# Patient Record
Sex: FEMALE | Race: WHITE | Employment: OTHER | URBAN - METROPOLITAN AREA
[De-identification: names, ages, dates, MRNs, and addresses within clinical notes are randomized per-mention and may not be internally consistent; named-entity substitution may affect disease eponyms.]

---

## 2018-02-27 ENCOUNTER — HOSPITAL ENCOUNTER (EMERGENCY)
Age: 83
Discharge: HOME | End: 2018-02-27
Attending: EMERGENCY MEDICINE | Admitting: EMERGENCY MEDICINE
Payer: MEDICARE

## 2018-02-27 ENCOUNTER — APPOINTMENT (OUTPATIENT)
Dept: CT IMAGING | Age: 83
End: 2018-02-27
Attending: EMERGENCY MEDICINE
Payer: MEDICARE

## 2018-02-27 VITALS
RESPIRATION RATE: 18 BRPM | SYSTOLIC BLOOD PRESSURE: 157 MMHG | DIASTOLIC BLOOD PRESSURE: 50 MMHG | OXYGEN SATURATION: 99 % | HEART RATE: 90 BPM | TEMPERATURE: 98.6 F | WEIGHT: 150 LBS | HEIGHT: 58 IN | BODY MASS INDEX: 31.49 KG/M2

## 2018-02-27 DIAGNOSIS — S00.83XA CONTUSION OF FACE, INITIAL ENCOUNTER: ICD-10-CM

## 2018-02-27 DIAGNOSIS — S09.90XA CLOSED HEAD INJURY, INITIAL ENCOUNTER: Primary | ICD-10-CM

## 2018-02-27 LAB
ALBUMIN SERPL-MCNC: 3.7 G/DL (ref 3.4–5)
ALBUMIN/GLOB SERPL: 1.2 {RATIO} (ref 0.8–1.7)
ALP SERPL-CCNC: 123 U/L (ref 45–117)
ALT SERPL-CCNC: 23 U/L (ref 13–56)
ANION GAP SERPL CALC-SCNC: 8 MMOL/L (ref 3–18)
AST SERPL-CCNC: 23 U/L (ref 15–37)
BASOPHILS # BLD: 0 K/UL (ref 0–0.06)
BASOPHILS NFR BLD: 0 % (ref 0–2)
BILIRUB SERPL-MCNC: 0.2 MG/DL (ref 0.2–1)
BUN SERPL-MCNC: 33 MG/DL (ref 7–18)
BUN/CREAT SERPL: 36 (ref 12–20)
CALCIUM SERPL-MCNC: 9.3 MG/DL (ref 8.5–10.1)
CHLORIDE SERPL-SCNC: 104 MMOL/L (ref 100–108)
CO2 SERPL-SCNC: 27 MMOL/L (ref 21–32)
CREAT SERPL-MCNC: 0.92 MG/DL (ref 0.6–1.3)
DIFFERENTIAL METHOD BLD: ABNORMAL
EOSINOPHIL # BLD: 0.3 K/UL (ref 0–0.4)
EOSINOPHIL NFR BLD: 3 % (ref 0–5)
ERYTHROCYTE [DISTWIDTH] IN BLOOD BY AUTOMATED COUNT: 11.9 % (ref 11.6–14.5)
GLOBULIN SER CALC-MCNC: 3 G/DL (ref 2–4)
GLUCOSE SERPL-MCNC: 106 MG/DL (ref 74–99)
HCT VFR BLD AUTO: 39.6 % (ref 35–45)
HGB BLD-MCNC: 13.3 G/DL (ref 12–16)
INR PPP: 1 (ref 0.8–1.2)
LYMPHOCYTES # BLD: 1.4 K/UL (ref 0.9–3.6)
LYMPHOCYTES NFR BLD: 15 % (ref 21–52)
MCH RBC QN AUTO: 32.2 PG (ref 24–34)
MCHC RBC AUTO-ENTMCNC: 33.6 G/DL (ref 31–37)
MCV RBC AUTO: 95.9 FL (ref 74–97)
MONOCYTES # BLD: 1.1 K/UL (ref 0.05–1.2)
MONOCYTES NFR BLD: 12 % (ref 3–10)
NEUTS SEG # BLD: 6.8 K/UL (ref 1.8–8)
NEUTS SEG NFR BLD: 70 % (ref 40–73)
PLATELET # BLD AUTO: 260 K/UL (ref 135–420)
PMV BLD AUTO: 8.8 FL (ref 9.2–11.8)
POTASSIUM SERPL-SCNC: 4.2 MMOL/L (ref 3.5–5.5)
PROT SERPL-MCNC: 6.7 G/DL (ref 6.4–8.2)
PROTHROMBIN TIME: 12.4 SEC (ref 11.5–15.2)
RBC # BLD AUTO: 4.13 M/UL (ref 4.2–5.3)
SODIUM SERPL-SCNC: 139 MMOL/L (ref 136–145)
WBC # BLD AUTO: 9.7 K/UL (ref 4.6–13.2)

## 2018-02-27 PROCEDURE — 80053 COMPREHEN METABOLIC PANEL: CPT

## 2018-02-27 PROCEDURE — 85610 PROTHROMBIN TIME: CPT

## 2018-02-27 PROCEDURE — 99283 EMERGENCY DEPT VISIT LOW MDM: CPT

## 2018-02-27 PROCEDURE — 72125 CT NECK SPINE W/O DYE: CPT

## 2018-02-27 PROCEDURE — 74011250637 HC RX REV CODE- 250/637: Performed by: EMERGENCY MEDICINE

## 2018-02-27 PROCEDURE — 70486 CT MAXILLOFACIAL W/O DYE: CPT

## 2018-02-27 PROCEDURE — 85025 COMPLETE CBC W/AUTO DIFF WBC: CPT

## 2018-02-27 PROCEDURE — 70450 CT HEAD/BRAIN W/O DYE: CPT

## 2018-02-27 RX ORDER — ACETAMINOPHEN 325 MG/1
975 TABLET ORAL
Status: COMPLETED | OUTPATIENT
Start: 2018-02-27 | End: 2018-02-27

## 2018-02-27 RX ORDER — ACEBUTOLOL HYDROCHLORIDE 200 MG/1
CAPSULE ORAL 2 TIMES DAILY
COMMUNITY
End: 2018-07-18

## 2018-02-27 RX ORDER — LOSARTAN POTASSIUM 100 MG/1
100 TABLET ORAL DAILY
COMMUNITY

## 2018-02-27 RX ORDER — ATORVASTATIN CALCIUM 20 MG/1
10 TABLET, FILM COATED ORAL DAILY
COMMUNITY
End: 2020-01-01

## 2018-02-27 RX ADMIN — ACETAMINOPHEN 975 MG: 325 TABLET ORAL at 21:24

## 2018-02-27 NOTE — ED TRIAGE NOTES
Tripped and fell on sidewalk, hit forehead to asphalt. Hematoma to left eyebrow, abrasion to left hand, abrasion to left knee.  Denies LOC

## 2018-02-27 NOTE — ED NOTES
Patient states trip and fall incident that resulted in striking left orbit/head onto concrete. She denies LOC. States taking 8 - 10 Aspirin per day. She states abrasions to left knee and left hand. No appreciable deformity noted to hand or knee. Bruising noted to left patella. She denies neck pain, paresthesias or other complaints at present.

## 2018-02-28 NOTE — DISCHARGE INSTRUCTIONS
Head Injury: Care Instructions  Your Care Instructions    Most injuries to the head are minor. Bumps, cuts, and scrapes on the head and face usually heal well and can be treated the same as injuries to other parts of the body. Although it's rare, once in a while a more serious problem shows up after you are home. So it's good to be on the lookout for symptoms for a day or two. Follow-up care is a key part of your treatment and safety. Be sure to make and go to all appointments, and call your doctor if you are having problems. It's also a good idea to know your test results and keep a list of the medicines you take. How can you care for yourself at home? · Follow your doctor's instructions. He or she will tell you if you need someone to watch you closely for the next 24 hours or longer. · Take it easy for the next few days or more if you are not feeling well. · Ask your doctor when it's okay for you to go back to activities like driving a car, riding a bike, or operating machinery. When should you call for help? Call 911 anytime you think you may need emergency care. For example, call if:  ? · You have a seizure. ? · You passed out (lost consciousness). ? · You are confused or can't stay awake. ?Call your doctor now or seek immediate medical care if:  ? · You have new or worse vomiting. ? · You feel less alert. ? · You have new weakness or numbness in any part of your body. ? Watch closely for changes in your health, and be sure to contact your doctor if:  ? · You do not get better as expected. ? · You have new symptoms, such as headaches, trouble concentrating, or changes in mood. Where can you learn more? Go to http://neeru-palmer.info/. Enter V596 in the search box to learn more about \"Head Injury: Care Instructions. \"  Current as of: October 14, 2016  Content Version: 11.4  © 1246-7553 Healthwise, Incorporated.  Care instructions adapted under license by [facility] (which disclaims liability or warranty for this information). If you have questions about a medical condition or this instruction, always ask your healthcare professional. [address] any warranty or liability for your use of this information. Learning About a Closed Head Injury  What is a closed head injury? A closed head injury happens when your head gets hit hard. The strong force of the blow causes your brain to shake in your skull. This movement can cause the brain to bruise, swell, or tear. Sometimes nerves or blood vessels also get damaged. This can cause bleeding in or around the brain. A concussion is a type of closed head injury. What are the symptoms? If you have a mild concussion, you may have a mild headache or feel \"not quite right. \" These symptoms are common. They usually go away over a few days to 4 weeks. But sometimes after a concussion, you feel like you can't function as well as before the injury. And you have new symptoms. This is called postconcussive syndrome. You may:  · Find it harder to solve problems, think, concentrate, or remember. · Have headaches. · Have changes in your sleep patterns, such as not being able to sleep or sleeping all the time. · Have changes in your personality. · Not be interested in your usual activities. · Feel angry or anxious without a clear reason. · Lose your sense of taste or smell. · Be dizzy, lightheaded, or unsteady. It may be hard to stand or walk. How is a closed head injury treated? Any person who may have a concussion needs to see a doctor. Some people have to stay in the hospital to be watched. Others can go home safely. If you go home, follow your doctor's instructions. He or she will tell you if you need someone to watch you closely for the next 24 hours or longer. Rest is the best treatment. Get plenty of sleep at night. And try to rest during the day.   · Avoid activities that are physically or mentally demanding. These include housework, exercise, and schoolwork. And don't play video games, send text messages, or use the computer. You may need to change your school or work schedule to be able to avoid these activities. · Ask your doctor when it's okay to drive, ride a bike, or operate machinery. · Take an over-the-counter pain medicine, such as acetaminophen (Tylenol), ibuprofen (Advil, Motrin), or naproxen (Aleve). Be safe with medicines. Read and follow all instructions on the label. · Check with your doctor before you use any other medicines for pain. · Do not drink alcohol or use illegal drugs. They can slow recovery. They can also increase your risk of getting a second head injury. Follow-up care is a key part of your treatment and safety. Be sure to make and go to all appointments, and call your doctor if you are having problems. It's also a good idea to know your test results and keep a list of the medicines you take. Where can you learn more? Go to http://neeru-palmer.info/. Enter E235 in the search box to learn more about \"Learning About a Closed Head Injury. \"  Current as of: October 14, 2016  Content Version: 11.4  © 7074-5266 Healthwise, Incorporated. Care instructions adapted under license by [employer] (which disclaims liability or warranty for this information). If you have questions about a medical condition or this instruction, always ask your healthcare professional. [place] [address] any warranty or liability for your use of this information.

## 2018-02-28 NOTE — ED PROVIDER NOTES
Patient is a 80 y.o. female presenting with head injury and fall. The history is provided by the patient. Head Injury    The incident occurred 1 to 2 hours ago. The injury mechanism was a fall (mechanical fall - pt slipped on sidewalk and hit L forehead. no LOC. ). The volume of blood lost was none. There was no loss of consciousness. She has been behaving normally. Fall          Past Medical History:   Diagnosis Date    Hypertension        History reviewed. No pertinent surgical history. History reviewed. No pertinent family history. Social History     Social History    Marital status:      Spouse name: N/A    Number of children: N/A    Years of education: N/A     Occupational History    Not on file. Social History Main Topics    Smoking status: Never Smoker    Smokeless tobacco: Never Used    Alcohol use No    Drug use: Not on file    Sexual activity: Not on file     Other Topics Concern    Not on file     Social History Narrative    No narrative on file         ALLERGIES: Review of patient's allergies indicates no known allergies. Review of Systems   Constitutional: Negative. HENT: Positive for facial swelling. Eyes: Negative. Respiratory: Negative. Cardiovascular: Negative. Gastrointestinal: Negative. Musculoskeletal: Negative. Skin: Negative. Neurological: Negative. Vitals:    02/27/18 1815 02/27/18 2027 02/27/18 2028   BP: 178/64 143/59    Pulse: 90     Resp: 18     Temp: 98.6 °F (37 °C)     SpO2: 100%  98%   Weight: 68 kg (150 lb)     Height: 4' 10\" (1.473 m)              Physical Exam   Constitutional: She is oriented to person, place, and time. She appears well-developed and well-nourished. HENT:   Head: Normocephalic.   (+) contusion L forehead   Eyes: EOM are normal. Pupils are equal, round, and reactive to light. Neck: Normal range of motion. Neck supple. Cardiovascular: Normal rate.     Pulmonary/Chest: Effort normal. Abdominal: Soft. Musculoskeletal: Normal range of motion. Neurological: She is alert and oriented to person, place, and time. Skin: Skin is warm. MDM  Number of Diagnoses or Management Options  Closed head injury, initial encounter:   Contusion of face, initial encounter:   Diagnosis management comments: Per Radiology, CT head shows no acute pathology. C-spine CT: Discussed incidental finding of thyroid nodule noted on Radiology report, encouraged prompt PMD f/u, pt and family members verbalized understanding and agreed with plan. Max/face CT: facial contusions. D/C home with PMD f/u. Strict return precautions given, pt and family verbalized understanding.               ED Course       Procedures

## 2018-03-09 ENCOUNTER — HOSPITAL ENCOUNTER (OUTPATIENT)
Dept: ULTRASOUND IMAGING | Age: 83
Discharge: HOME | End: 2018-03-09
Attending: FAMILY MEDICINE
Payer: MEDICARE

## 2018-03-09 DIAGNOSIS — E04.1 THYROID NODULE: ICD-10-CM

## 2018-03-09 PROCEDURE — 76536 US EXAM OF HEAD AND NECK: CPT

## 2018-05-09 ENCOUNTER — HOSPITAL ENCOUNTER (OUTPATIENT)
Dept: ULTRASOUND IMAGING | Age: 83
Discharge: HOME | End: 2018-05-09
Attending: OTOLARYNGOLOGY
Payer: MEDICARE

## 2018-05-09 DIAGNOSIS — E04.1 NONTOXIC SINGLE THYROID NODULE: ICD-10-CM

## 2018-05-09 PROCEDURE — 88305 TISSUE EXAM BY PATHOLOGIST: CPT | Performed by: OTOLARYNGOLOGY

## 2018-05-09 PROCEDURE — 88172 CYTP DX EVAL FNA 1ST EA SITE: CPT | Performed by: OTOLARYNGOLOGY

## 2018-05-09 PROCEDURE — 88173 CYTOPATH EVAL FNA REPORT: CPT | Performed by: OTOLARYNGOLOGY

## 2018-05-09 PROCEDURE — 10022 US GUIDE FINE NDL ASP THYROID: CPT

## 2018-05-09 PROCEDURE — 88177 CYTP FNA EVAL EA ADDL: CPT | Performed by: OTOLARYNGOLOGY

## 2018-05-09 NOTE — PROCEDURES
RADIOLOGY POST PROCEDURE NOTE     May 9, 2018       3:22 PM     Preoperative Diagnosis:   Right thyroid nodule. Postoperative Diagnosis:  Same. :  Dr. Gustabo Mosley. Assistant:  None. Type of Anesthesia: 1% plain lidocaine    Procedure/Description:  US guided right thyroid lobe nodule FNA    Findings:   No bleeding. Estimated blood Loss:  Minimal    Specimen Removed:   yes    Blood transfusions:  None. Implants:  None.     Complications: None    Condition: Stable    Discharge Plan:  discharge home     Manan Bianchi MD

## 2018-05-09 NOTE — H&P
OUTPATIENT HISTORY AND PHYSICAL      Today 5/9/2018     Indication/Symptoms:   Deep Momin is a 80 y.o. female here for image guided right thyroid lobe nodule FNA biopsy. Current Meds:    Prior to Admission medications    Medication Sig Start Date End Date Taking? Authorizing Provider   losartan (COZAAR) 100 mg tablet Take 100 mg by mouth daily. David Morris MD   acebutolol (SECTRAL) 200 mg capsule Take  by mouth two (2) times a day. David Morris MD   atorvastatin (LIPITOR) 20 mg tablet Take  by mouth daily. David Morris MD       Allergies:    No Known Allergies    Comorbid Conditions:    Past Medical History:   Diagnosis Date    Hypertension         No past surgical history on file. Data:    There were no vitals taken for this visit.:  No results for input(s): PLT, PLTEXT in the last 72 hours. No lab exists for component:  HCT  No results for input(s): INR, APTT in the last 72 hours. No lab exists for component: PT, INREXT    The H & P and/or progress notes and any available imaging were reviewed. The risks, indications and possible alternatives to the procedure, including doing nothing, were discussed and informed consent was obtained. Physical Exam:      Mental status:   Alert and oriented. Examination specific to the procedure proposed to be performed and any co morbid conditions:   Mallampati classification 2 ,  ASA2   Heart:   RRR. Lungs:   CTAB. No wheezes, rales or rhonchi. The patient is an appropriate candidate to undergo the planned procedure and sedation.     Rose Mary Juan MD

## 2018-06-20 ENCOUNTER — APPOINTMENT (OUTPATIENT)
Dept: GENERAL RADIOLOGY | Age: 83
End: 2018-06-20
Attending: NURSE PRACTITIONER
Payer: MEDICARE

## 2018-06-20 ENCOUNTER — HOSPITAL ENCOUNTER (EMERGENCY)
Age: 83
Discharge: HOME | End: 2018-06-20
Attending: EMERGENCY MEDICINE
Payer: MEDICARE

## 2018-06-20 VITALS
RESPIRATION RATE: 16 BRPM | WEIGHT: 152 LBS | HEART RATE: 74 BPM | HEIGHT: 58 IN | SYSTOLIC BLOOD PRESSURE: 172 MMHG | BODY MASS INDEX: 31.91 KG/M2 | DIASTOLIC BLOOD PRESSURE: 65 MMHG | OXYGEN SATURATION: 97 % | TEMPERATURE: 98.2 F

## 2018-06-20 DIAGNOSIS — S89.92XA INJURY OF LEFT KNEE, INITIAL ENCOUNTER: ICD-10-CM

## 2018-06-20 DIAGNOSIS — S93.602A FOOT SPRAIN, LEFT, INITIAL ENCOUNTER: ICD-10-CM
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
## 2018-06-20 PROCEDURE — 73562 X-RAY EXAM OF KNEE 3: CPT

## 2018-06-20 PROCEDURE — 73630 X-RAY EXAM OF FOOT: CPT

## 2018-06-20 PROCEDURE — 99283 EMERGENCY DEPT VISIT LOW MDM: CPT

## 2018-06-20 PROCEDURE — 99282 EMERGENCY DEPT VISIT SF MDM: CPT

## 2018-06-20 RX ORDER — ACETAMINOPHEN 325 MG/1
650 TABLET ORAL
Qty: 20 TAB | Refills: 0 | Status: SHIPPED | OUTPATIENT
Start: 2018-06-20 | End: 2018-07-18

## 2018-06-20 NOTE — ED PROVIDER NOTES
HPI Comments: 10:50 AM   80 y.o. female presents to ED C/O fall, left foot and knee pain. Patient has a HX of HTN. Patient reports mechanical fall yesterday, tripped and fell up three steps, injuring left foot and striking left knee on step. patient reports chronic left knee pain, worse today than normal and significant left foot pain, too painful to walk on, has been using wheelchair at home today. Patient denies head injury, LOC, neck or back pain, CP, SOB. Patient has taken no medication for pain prior to arrival.   Patient denies any other symptoms or complaints. The history is provided by the patient. History limited by: No language barrier         Past Medical History:   Diagnosis Date    Hypertension        History reviewed. No pertinent surgical history. History reviewed. No pertinent family history. Social History     Social History    Marital status:      Spouse name: N/A    Number of children: N/A    Years of education: N/A     Occupational History    Not on file. Social History Main Topics    Smoking status: Never Smoker    Smokeless tobacco: Never Used    Alcohol use No    Drug use: Not on file    Sexual activity: Not on file     Other Topics Concern    Not on file     Social History Narrative         ALLERGIES: Other food; Chlorhexidine; Lisinopril; and Other medication    Review of Systems   Constitutional: Negative for appetite change and fever. HENT: Negative for congestion, rhinorrhea and sore throat. Respiratory: Negative for cough, shortness of breath and wheezing. Cardiovascular: Negative for chest pain and leg swelling. Gastrointestinal: Negative for abdominal pain, constipation, diarrhea, nausea and vomiting. Genitourinary: Negative for dysuria. Musculoskeletal: Positive for arthralgias and myalgias. Negative for back pain, neck pain and neck stiffness. Neurological: Negative for dizziness, syncope and headaches.    All other systems reviewed and are negative. Vitals:    06/20/18 1049   BP: 172/65   Pulse: 74   Resp: 16   Temp: 98.2 °F (36.8 °C)   SpO2: 97%   Weight: 68.9 kg (152 lb)   Height: 4' 10\" (1.473 m)            Physical Exam   Constitutional: She is oriented to person, place, and time. She appears well-developed and well-nourished. No distress. HENT:   Head: Atraumatic. Mouth/Throat: Oropharynx is clear and moist.   Cardiovascular: Normal rate, regular rhythm and normal heart sounds. Pulses:       Dorsalis pedis pulses are 2+ on the right side, and 2+ on the left side. Pulmonary/Chest: Effort normal and breath sounds normal. No respiratory distress. She has no wheezes. She has no rales. Musculoskeletal:        Left knee: She exhibits erythema and bony tenderness. She exhibits normal range of motion and no swelling. Left ankle: She exhibits normal range of motion. No tenderness. Left foot: There is tenderness and bony tenderness. There is normal range of motion, no swelling, no crepitus, no deformity and no laceration. Feet:    Neurological: She is alert and oriented to person, place, and time. She exhibits normal muscle tone. Coordination normal.   Skin: Skin is warm and dry. No rash noted. She is not diaphoretic. No erythema. No pallor. Nursing note and vitals reviewed. MDM  Number of Diagnoses or Management Options  Fall, initial encounter: Foot sprain, left, initial encounter:   Injury of left knee, initial encounter:   Diagnosis management comments: MDM:  Plan - xray of left knee and foot  Progress -   Left foot - Impression:  1.  Osteopenia. No definite acute fracture. Large bunion and deformity at the  first MCP joint, chronic. Left knee - Impression:  1.  No fracture. Moderate tricompartmental osteoarthritic change.  -Ace wrap for comfort applied to left foot and knee. Discussed with patient possible splint for left foot sprain but would prefer ace wrap.   Discussed SEAN referral to orthopedist if symptoms not improved in 5 days. Patient educated to return to the ED for any new or worsening symptoms. Patient denies questions. Amount and/or Complexity of Data Reviewed  Tests in the radiology section of CPT®: ordered and reviewed          ED Course       Procedures        RESULTS:    XR KNEE LT 3 V   Final Result      XR FOOT LT MIN 3 V   Final Result          Labs Reviewed - No data to display    No results found for this or any previous visit (from the past 12 hour(s)). PROGRESS NOTE:   10:50 AM   Initial assessment completed. Written by Lencho BARAKAT    DISCHARGE NOTE:  12:50 PM  Mk Claudio  results have been reviewed with her. She has been counseled regarding her diagnosis, treatment, and plan. She verbally conveys understanding and agreement of the signs, symptoms, diagnosis, treatment and prognosis and additionally agrees to follow up as discussed. She also agrees with the care-plan and conveys that all of her questions have been answered. I have also provided discharge instructions for her that include: educational information regarding their diagnosis and treatment, and list of reasons why they would want to return to the ED prior to their follow-up appointment, should her condition change. CLINICAL IMPRESSION:    1. Fall, initial encounter    2. Injury of left knee, initial encounter    3. Foot sprain, left, initial encounter        AFTER VISIT PLAN:    Current Discharge Medication List      START taking these medications    Details   acetaminophen (TYLENOL) 325 mg tablet Take 2 Tabs by mouth every four (4) hours as needed for Pain.   Qty: 20 Tab, Refills: 0              Follow-up Information     Follow up With Details Comments Contact Info    Alton Medina MD Schedule an appointment as soon as possible for a visit in 1 week As needed [id number] [address] [facility]      Cynthia Nguyen MD Schedule an appointment as soon as possible for a visit in 5 days As needed [address] [facility]  [phone]             Written by Sandra NORWOODC

## 2018-06-20 NOTE — ED TRIAGE NOTES
Pt states she tripped up 3 stairs  yest states she  Worse  Shoe until last pm able to walk on it,  this am  Unable to bear weight, noted swelling to top of left  foot

## 2018-06-20 NOTE — DISCHARGE INSTRUCTIONS
Foot Sprain: Care Instructions  Your Care Instructions    A foot sprain occurs when you stretch or tear the ligaments around your foot. Ligaments are the tough tissues that connect one bone to another. A sprain can happen when you run, fall, or hit your toe against something. Sprains often happen when you jump or change direction quickly. This may occur when you play basketball, soccer, or other sports. Most foot sprains will get better with treatment at home. Follow-up care is a key part of your treatment and safety. Be sure to make and go to all appointments, and call your doctor if you are having problems. It's also a good idea to know your test results and keep a list of the medicines you take. How can you care for yourself at home? · Walk or put weight on your sprained foot as long as it does not hurt. · If your doctor gave you a splint or immobilizer, wear it as directed. If you were given crutches, use them as directed. · For the first 2 days after your injury, avoid hot showers, hot tubs, or hot packs. They may increase swelling. · Put ice or a cold pack on your foot for 10 to 20 minutes at a time to stop swelling. Try this every 1 to 2 hours for 3 days (when you are awake) or until the swelling goes down. Put a thin cloth between the ice pack and your skin. Keep your splint dry. · After 2 or 3 days, if your swelling is gone, put a heating pad (set on low) or a warm cloth on your foot. Some doctors suggest that you go back and forth between hot and cold treatments. · Prop up your foot on a pillow when you ice it or anytime you sit or lie down. Try to keep it above the level of your heart. This will help reduce swelling. · Take pain medicines exactly as directed. ¨ If the doctor gave you a prescription medicine for pain, take it as prescribed. ¨ If you are not taking a prescription pain medicine, ask your doctor if you can take an over-the-counter medicine.   · Do any exercises that your doctor or physical therapist suggests. · Return to your usual exercise gradually as you feel better. When should you call for help? Call your doctor now or seek immediate medical care if:  ? · You have increased or severe pain. ? · Your toes are cool or pale or change color. ? · Your wrap or splint feels too tight. ? · You have signs of a blood clot, such as:  ¨ Pain in your calf, back of the knee, thigh, or groin. ¨ Redness and swelling in your leg or groin. ? · You have tingling, weakness, or numbness in your leg or foot. ? Watch closely for changes in your health, and be sure to contact your doctor if:  ? · You cannot put any weight on your foot. ? · You get a fever. ? · You do not get better as expected. Where can you learn more? Go to http://neeru-palmer.info/. Enter V293 in the search box to learn more about \"Foot Sprain: Care Instructions. \"  Current as of: March 21, 2017  Content Version: 11.4  © 3825-1810 [employer]. Care instructions adapted under license by [employer] (which disclaims liability or warranty for this information). If you have questions about a medical condition or this instruction, always ask your healthcare professional. [address] any warranty or liability for your use of this information.

## 2018-07-17 ENCOUNTER — HOSPITAL ENCOUNTER (INPATIENT)
Age: 83
LOS: 1 days | Discharge: HOME | DRG: 310 | End: 2018-07-18
Attending: EMERGENCY MEDICINE | Admitting: FAMILY MEDICINE
Payer: MEDICARE

## 2018-07-17 ENCOUNTER — APPOINTMENT (OUTPATIENT)
Dept: GENERAL RADIOLOGY | Age: 83
DRG: 310 | End: 2018-07-17
Attending: EMERGENCY MEDICINE
Payer: MEDICARE

## 2018-07-17 DIAGNOSIS — R55 SYNCOPE, UNSPECIFIED SYNCOPE TYPE: Primary | ICD-10-CM

## 2018-07-17 LAB
ALBUMIN SERPL-MCNC: 3.4 G/DL (ref 3.4–5)
ALBUMIN/GLOB SERPL: 1.1 {RATIO} (ref 0.8–1.7)
ALP SERPL-CCNC: 123 U/L (ref 45–117)
ALT SERPL-CCNC: 20 U/L (ref 13–56)
ANION GAP SERPL CALC-SCNC: 8 MMOL/L (ref 3–18)
AST SERPL-CCNC: 24 U/L (ref 15–37)
BASOPHILS # BLD: 0 K/UL (ref 0–0.06)
BASOPHILS NFR BLD: 1 % (ref 0–2)
BILIRUB SERPL-MCNC: 0.1 MG/DL (ref 0.2–1)
BUN SERPL-MCNC: 25 MG/DL (ref 7–18)
BUN/CREAT SERPL: 30 (ref 12–20)
CALCIUM SERPL-MCNC: 9 MG/DL (ref 8.5–10.1)
CHLORIDE SERPL-SCNC: 112 MMOL/L (ref 100–108)
CK MB CFR SERPL CALC: 0.6 % (ref 0–4)
CK MB SERPL-MCNC: 1.9 NG/ML (ref 5–25)
CK SERPL-CCNC: 338 U/L (ref 26–192)
CO2 SERPL-SCNC: 21 MMOL/L (ref 21–32)
CREAT SERPL-MCNC: 0.84 MG/DL (ref 0.6–1.3)
DIFFERENTIAL METHOD BLD: ABNORMAL
EOSINOPHIL # BLD: 0.3 K/UL (ref 0–0.4)
EOSINOPHIL NFR BLD: 4 % (ref 0–5)
ERYTHROCYTE [DISTWIDTH] IN BLOOD BY AUTOMATED COUNT: 12.2 % (ref 11.6–14.5)
GLOBULIN SER CALC-MCNC: 3.1 G/DL (ref 2–4)
GLUCOSE SERPL-MCNC: 101 MG/DL (ref 74–99)
HCT VFR BLD AUTO: 35.2 % (ref 35–45)
HGB BLD-MCNC: 12.3 G/DL (ref 12–16)
LYMPHOCYTES # BLD: 1.4 K/UL (ref 0.9–3.6)
LYMPHOCYTES NFR BLD: 16 % (ref 21–52)
MCH RBC QN AUTO: 32.1 PG (ref 24–34)
MCHC RBC AUTO-ENTMCNC: 34.9 G/DL (ref 31–37)
MCV RBC AUTO: 91.9 FL (ref 74–97)
MONOCYTES # BLD: 1 K/UL (ref 0.05–1.2)
MONOCYTES NFR BLD: 11 % (ref 3–10)
NEUTS SEG # BLD: 6.1 K/UL (ref 1.8–8)
NEUTS SEG NFR BLD: 68 % (ref 40–73)
PLATELET # BLD AUTO: 248 K/UL (ref 135–420)
PMV BLD AUTO: 9 FL (ref 9.2–11.8)
POTASSIUM SERPL-SCNC: 4.4 MMOL/L (ref 3.5–5.5)
PROT SERPL-MCNC: 6.5 G/DL (ref 6.4–8.2)
RBC # BLD AUTO: 3.83 M/UL (ref 4.2–5.3)
SODIUM SERPL-SCNC: 141 MMOL/L (ref 136–145)
TROPONIN I SERPL-MCNC: <0.02 NG/ML (ref 0–0.04)
WBC # BLD AUTO: 8.8 K/UL (ref 4.6–13.2)

## 2018-07-17 PROCEDURE — 80053 COMPREHEN METABOLIC PANEL: CPT | Performed by: EMERGENCY MEDICINE

## 2018-07-17 PROCEDURE — 71045 X-RAY EXAM CHEST 1 VIEW: CPT

## 2018-07-17 PROCEDURE — 85025 COMPLETE CBC W/AUTO DIFF WBC: CPT | Performed by: EMERGENCY MEDICINE

## 2018-07-17 PROCEDURE — 65660000000 HC RM CCU STEPDOWN

## 2018-07-17 PROCEDURE — 84484 ASSAY OF TROPONIN QUANT: CPT | Performed by: EMERGENCY MEDICINE

## 2018-07-17 PROCEDURE — 93005 ELECTROCARDIOGRAM TRACING: CPT

## 2018-07-17 PROCEDURE — 99285 EMERGENCY DEPT VISIT HI MDM: CPT

## 2018-07-17 NOTE — IP AVS SNAPSHOT
[address] [place] [address] Rubene Magda Patient: Mat Olivares MRN: [id number] IKA:[id number] A check junior indicates which time of day the medication should be taken. My Medications START taking these medications Instructions Each Dose to Equal  
 Morning Noon Evening Bedtime  
 amLODIPine 5 mg tablet Commonly known as:  [place] Start taking on:  7/19/2018 Take 1 Tab by mouth daily. 5 mg  
  7/19/2018 at 0900 CONTINUE taking these medications Instructions Each Dose to Equal  
 Morning Noon Evening Bedtime  
 aspirin 325 mg tablet Commonly known as:  ASPIRIN Take 975 mg by mouth three (3) times daily. 975 mg  
  7/19/2018 at 0900  
   
   
   
  
 carBAMazepine  mg capsule Commonly known as:  CARBATROL ER Take 100 mg by mouth two (2) times a day. 100 mg  
    
   
   
   
  
 LIPITOR 20 mg tablet Generic drug:  atorvastatin Take 10 mg by mouth daily. 10 mg  
    
   
   
 7/18/2018 at 2100 LORazepam 0.5 mg tablet Commonly known as:  ATIVAN Take  by mouth every eight (8) hours as needed for Anxiety. losartan 100 mg tablet Commonly known as:  COZAAR Take 100 mg by mouth daily. 100 mg  
  7/19/2018 at 0900  
   
   
   
  
 montelukast 10 mg tablet Commonly known as:  SINGULAIR Take 10 mg by mouth as needed. 10 mg  
    
   
   
   
  
  
STOP taking these medications SECTRAL 200 mg capsule Generic drug:  acebutolol Where to Get Your Medications Information on where to get these meds will be given to you by the nurse or doctor. ! Ask your nurse or doctor about these medications  
  amLODIPine 5 mg tablet

## 2018-07-17 NOTE — IP AVS SNAPSHOT
[address] Patient: Osman Pinedo MRN: [id number]:[id number] About your hospitalization You were admitted on:  July 17, 2018 You last received care in the:  [facility] [address] You were discharged on:  July 18, 2018 Why you were hospitalized Your primary diagnosis was:  Syncope Your diagnoses also included:  Htn (Hypertension), Trigeminal Neuralgia, Sle (Systemic Lupus Erythematosus) (Hcc) Follow-up Information Follow up With Details Comments Contact Info Funmilayo Parikh MD Schedule an appointment as soon as possible for a visit on 7/24/2018 Follow up @ 1:15 p.m. [address] [facility] [address] 
[phone] Kenia Benavidez MD In 2 weeks Hospital Follow Up  [address] [facility] [place] [address] 
[phone] Discharge Orders None A check junior indicates which time of day the medication should be taken. My Medications START taking these medications Instructions Each Dose to Equal  
 Morning Noon Evening Bedtime  
 amLODIPine 5 mg tablet Commonly known as:  Izetta Harder Start taking on:  7/19/2018 Take 1 Tab by mouth daily. 5 mg  
  7/19/2018 at 0900 CONTINUE taking these medications Instructions Each Dose to Equal  
 Morning Noon Evening Bedtime  
 aspirin 325 mg tablet Commonly known as:  ASPIRIN Take 975 mg by mouth three (3) times daily. 975 mg  
  7/19/2018 at 0900  
   
   
   
  
 carBAMazepine  mg capsule Commonly known as:  CARBATROL ER Take 100 mg by mouth two (2) times a day. 100 mg  
    
   
   
   
  
 LIPITOR 20 mg tablet Generic drug:  atorvastatin Take 10 mg by mouth daily. 10 mg  
    
   
   
 7/18/2018 at 2100 LORazepam 0.5 mg tablet Commonly known as:  ATIVAN  
   
 Take  by mouth every eight (8) hours as needed for Anxiety. losartan 100 mg tablet Commonly known as:  COZAAR Take 100 mg by mouth daily. 100 mg  
  2018 at 0900  
   
   
   
  
 montelukast 10 mg tablet Commonly known as:  SINGULAIR Take 10 mg by mouth as needed. 10 mg  
    
   
   
   
  
  
STOP taking these medications SECTRAL 200 mg capsule Generic drug:  acebutolol Where to Get Your Medications Information on where to get these meds will be given to you by the nurse or doctor. ! Ask your nurse or doctor about these medications  
  amLODIPine 5 mg tablet Discharge Instructions Patient armband removed and shredded MyChart Activation Thank you for requesting access to [facility]. Please follow the instructions below to securely access and download your online medical record. [facility] allows you to send messages to your doctor, view your test results, renew your prescriptions, schedule appointments, and more. How Do I Sign Up? 1. In your internet browser, go to [email or web address] 
2. Click on the First Time User? Click Here link in the Sign In box. You will be redirect to the New Member Sign Up page. 3. Enter your [facility] Access Code exactly as it appears below. You will not need to use this code after youve completed the sign-up process. If you do not sign up before the expiration date, you must request a new code. [facility] Access Code: [email or web address] Expires: 2018 10:47 AM (This is the date your [facility] access code will ) 4. Enter the last four digits of your Social Security Number (xxxx) and Date of Birth (mm/dd/yyyy) as indicated and click Submit. You will be taken to the next sign-up page. 5. Create a [facility] ID. This will be your [facility] login ID and cannot be changed, so think of one that is secure and easy to remember. 6. Create a [employer] password. You can change your password at any time. 7. Enter your Password Reset Question and Answer. This can be used at a later time if you forget your password. 8. Enter your e-mail address. You will receive e-mail notification when new information is available in [address] 9. Click Sign Up. You can now view and download portions of your medical record. 10. Click the Download Summary menu link to download a portable copy of your medical information. Additional Information If you have questions, please visit the Frequently Asked Questions section of the [employer] website at [email or web address] Remember, [employer] is NOT to be used for urgent needs. For medical emergencies, dial 911. DISCHARGE SUMMARY from Nurse PATIENT INSTRUCTIONS: 
 
After general anesthesia or intravenous sedation, for 24 hours or while taking prescription Narcotics: · Limit your activities · Do not drive and operate hazardous machinery · Do not make important personal or business decisions · Do  not drink alcoholic beverages · If you have not urinated within 8 hours after discharge, please contact your surgeon on call. Report the following to your surgeon: 
· Excessive pain, swelling, redness or odor of or around the surgical area · Temperature over 100.5 · Nausea and vomiting lasting longer than 4 hours or if unable to take medications · Any signs of decreased circulation or nerve impairment to extremity: change in color, persistent  numbness, tingling, coldness or increase pain · Any questions What to do at Home: 
Recommended activity: Activity as tolerated, If you experience any of the following symptoms chest pains, shortness of breath, fever, chills, elevated temperature greater than 100.9 F,fainting, loss of consciousness, altered mental statusplease follow up with nearest Emergency room.  
 
*  Please give a list of your current medications to your Primary Care Provider. *  Please update this list whenever your medications are discontinued, doses are 
    changed, or new medications (including over-the-counter products) are added. *  Please carry medication information at all times in case of emergency situations. These are general instructions for a healthy lifestyle: No smoking/ No tobacco products/ Avoid exposure to second hand smoke Surgeon General's Warning:  Quitting smoking now greatly reduces serious risk to your health. Obesity, smoking, and sedentary lifestyle greatly increases your risk for illness A healthy diet, regular physical exercise & weight monitoring are important for maintaining a healthy lifestyle You may be retaining fluid if you have a history of heart failure or if you experience any of the following symptoms:  Weight gain of 3 pounds or more overnight or 5 pounds in a week, increased swelling in our hands or feet or shortness of breath while lying flat in bed. Please call your doctor as soon as you notice any of these symptoms; do not wait until your next office visit. Recognize signs and symptoms of STROKE: 
 
F-face looks uneven A-arms unable to move or move unevenly S-speech slurred or non-existent T-time-call 911 as soon as signs and symptoms begin-DO NOT go Back to bed or wait to see if you get better-TIME IS BRAIN. Warning Signs of HEART ATTACK Call 911 if you have these symptoms: 
? Chest discomfort. Most heart attacks involve discomfort in the center of the chest that lasts more than a few minutes, or that goes away and comes back. It can feel like uncomfortable pressure, squeezing, fullness, or pain. ? Discomfort in other areas of the upper body. Symptoms can include pain or discomfort in one or both arms, the back, neck, jaw, or stomach. ? Shortness of breath with or without chest discomfort. ? Other signs may include breaking out in a cold sweat, nausea, or lightheadedness. Don't wait more than five minutes to call [address]! Fast action can save your life. Calling 911 is almost always the fastest way to get lifesaving treatment. Emergency Medical Services staff can begin treatment when they arrive  up to an hour sooner than if someone gets to the hospital by car. The discharge information has been reviewed with the patient. The patient verbalized understanding. Discharge medications reviewed with the patient and appropriate educational materials and side effects teaching were provided. ___________________________________________________________________________________________________________________________________ ApoV[employer] Announcement We are excited to announce that we are making your provider's discharge notes available to you in [employer]. You will see these notes when they are completed and signed by the physician that discharged you from your recent hospital stay. If you have any questions or concerns about any information you see in [employer], please call the Health Information Department where you were seen or reach out to your Primary Care Provider for more information about your plan of care. Introducing [facility]! [employer] introduces [employer] patient portal. Now you can access parts of your medical record, email your doctor's office, and request medication refills online. 1. In your internet browser, go to [email or web address] 2. Click on the First Time User? Click Here link in the Sign In box. You will see the New Member Sign Up page. 3. Enter your [employer] Access Code exactly as it appears below. You will not need to use this code after youve completed the sign-up process. If you do not sign up before the expiration date, you must request a new code. · [employer] Access Code: [email or web address] Expires: 9/18/2018 10:47 AM 
 
4.  Enter the last four digits of your Social Security Number (xxxx) and Date of Birth (mm/dd/yyyy) as indicated and click Submit. You will be taken to the next sign-up page. 5. Create a GeoPal Solutionst ID. This will be your Smarterphone login ID and cannot be changed, so think of one that is secure and easy to remember. 6. Create a [employer] Solutionst password. You can change your password at any time. 7. Enter your Password Reset Question and Answer. This can be used at a later time if you forget your password. 8. Enter your e-mail address. You will receive e-mail notification when new information is available in [address] 9. Click Sign Up. You can now view and download portions of your medical record. 10. Click the Download Summary menu link to download a portable copy of your medical information. If you have questions, please visit the Frequently Asked Questions section of the Smarterphone website. Remember, [employer] is NOT to be used for urgent needs. For medical emergencies, dial 911. Now available from your iPhone and Android! Introducing Eddie Berman As a [employer] patient, I wanted to make you aware of our electronic visit tool called Eddie Berman. [employer] 24/7 allows you to connect within minutes with a medical provider 24 hours a day, seven days a week via a mobile device or tablet or logging into a secure website from your computer. You can access Eddie Berman from anywhere in the [place]. A virtual visit might be right for you when you have a simple condition and feel like you just dont want to get out of bed, or cant get away from work for an appointment, when your regular [employer] provider is not available (evenings, weekends or holidays), or when youre out of town and need minor care. Electronic visits cost only $49 and if the [employer] 24/7 provider determines a prescription is needed to treat your condition, one can be electronically transmitted to a nearby pharmacy*. Please take a moment to enroll today if you have not already done so. The enrollment process is free and takes just a few minutes. To enroll, please download the [employer] 24/7 marian to your tablet or phone, or visit [email or web address] to enroll on your computer. And, as an [address] [place] [address] patient with a Crowdfynd account, the results of your visits will be scanned into your electronic medical record and your primary care provider will be able to view the scanned results. We urge you to continue to see your regular [employer] provider for your ongoing medical care. And while your primary care provider may not be the one available when you seek a dev9k virtual visit, the peace of mind you get from getting a real diagnosis real time can be priceless. For more information on dev9k, view our Frequently Asked Questions (FAQs) at [email or web address]. Sincerely, 
 
Vivian Ramos MD 
Chief Medical Officer [facility] Nasra Feliciano *:  certain medications cannot be prescribed via dev9k Providers Seen During Your Hospitalization Provider Specialty Primary office phone Myke Bolanos MD Emergency Medicine [phone] Jaxon Fierro MD [facility] [phone] Perez Francisco MD Family Practice [phone] Your Primary Care Physician (PCP) Primary Care Physician Office Phone Office Fax Lonnie Young [phone] You are allergic to the following Allergen Reactions Other Food Hives Chlorhexidine Other (comments) Hives and swelling on contact Lisinopril Cough Other Medication Hives Most blood pressure meds Recent Documentation Height Weight Breastfeeding? BMI Smoking Status 1.473 m 68.9 kg No 31.77 kg/m2 Never Smoker Emergency Contacts Name Discharge Info Relation Home Work Mobile Murray Buck DISCHARGE CAREGIVER [3] Spouse [3] [phone] [facility] CAREGIVER [3] Daughter [21] [phone] Patient Belongings The following personal items are in your possession at time of discharge: 
  Dental Appliances: None  Visual Aid: Glasses, With patient      Home Medications: None   Jewelry: Ring, Watch (2 rings)  Clothing: Pants, Undergarments, Shirt, Footwear    Other Valuables: Purse, Cell Phone  Personal Items Sent to Safe: no 
 
  
  
Discharge Instructions Attachments/References FAINTING (ENGLISH) BRADYCARDIA (ENGLISH) Patient Handouts Fainting: Care Instructions Your Care Instructions When you faint, or pass out, you lose consciousness for a short time. A brief drop in blood flow to the brain often causes it. When you fall or lie down, more blood flows to your brain and you regain consciousness. Emotional stress, pain, or overheating-especially if you have been standing-can make you faint. In these cases, fainting is usually not serious. But fainting can be a sign of a more serious problem. Your doctor may want you to have more tests to rule out other causes. The treatment you need depends on the reason why you fainted. The doctor has checked you carefully, but problems can develop later. If you notice any problems or new symptoms, get medical treatment right away. Follow-up care is a key part of your treatment and safety. Be sure to make and go to all appointments, and call your doctor if you are having problems. It's also a good idea to know your test results and keep a list of the medicines you take. How can you care for yourself at home? · Drink plenty of fluids to prevent dehydration. If you have kidney, heart, or liver disease and have to limit fluids, talk with your doctor before you increase your fluid intake. When should you call for help? Call 911 anytime you think you may need emergency care. For example, call if:   · You have symptoms of a heart problem. These may include: ¨ Chest pain or pressure. ¨ Severe trouble breathing. ¨ A fast or irregular heartbeat. ¨ Lightheadedness or sudden weakness. ¨ Coughing up pink, foamy mucus. ¨ Passing out. After you call 911, the  may tell you to chew 1 adult-strength or 2 to 4 low-dose aspirin. Wait for an ambulance. Do not try to drive yourself.  
  · You have symptoms of a stroke. These may include: 
¨ Sudden numbness, tingling, weakness, or loss of movement in your face, arm, or leg, especially on only one side of your body. ¨ Sudden vision changes. ¨ Sudden trouble speaking. ¨ Sudden confusion or trouble understanding simple statements. ¨ Sudden problems with walking or balance. ¨ A sudden, severe headache that is different from past headaches.  
  · You passed out (lost consciousness) again.  
 Watch closely for changes in your health, and be sure to contact your doctor if: 
  · You do not get better as expected. Where can you learn more? Go to http://neeru-palmer.info/. Enter P235 in the search box to learn more about \"Fainting: Care Instructions. \" Current as of: November 20, 2017 Content Version: 11.7 © 3547-7549 Ku. Care instructions adapted under license by [employer] (which disclaims liability or warranty for this information). If you have questions about a medical condition or this instruction, always ask your healthcare professional. [place] [address] any warranty or liability for your use of this information. Bradycardia: Care Instructions Your Care Instructions Bradycardia is a slow heart rate. If your heart beats too slowly, it can't supply your body with enough blood. This can make you weak or dizzy. Or it may make you pass out. Sometimes medicine can cause this problem.  If this happens, your doctor may have you adjust one of your medicines. If a medicine is not the problem, your doctor may recommend a pacemaker. It is important to treat bradycardia so that you don't get more serious health problems. Your doctor will want to see you on a routine schedule to make sure that your heartbeat is normal. 
Follow-up care is a key part of your treatment and safety. Be sure to make and go to all appointments, and call your doctor if you are having problems. It's also a good idea to know your test results and keep a list of the medicines you take. How can you care for yourself at home? · Take your medicines exactly as prescribed. Call your doctor if you think you are having a problem with your medicine. If your bradycardia is caused by another disease, your doctor will try to treat the disease. If it is caused by heart medicines, he or she will adjust your medicines. · Make lifestyle changes to improve your heart health. ¨ Get regular exercise. Try for 30 minutes on most days of the week. If you do not have other heart problems, you likely do not have limits on the type or level of activity that you can do. You may want to walk, swim, bike, or do other activities. Ask your doctor what level of exercise is safe for you. ¨ To control your cholesterol, avoid foods with a lot of fat, saturated fat, or sodium. Try to eat more fiber. And if your doctor says it's okay, get some exercise on most days. ¨ Do not smoke. Smoking can make your heart condition worse. If you need help quitting, talk to your doctor about stop-smoking programs and medicines. These can increase your chances of quitting for good. ¨ Limit alcohol to 2 drinks a day for men and 1 drink a day for women. Too much alcohol can cause health problems. Pacemaker If you have a pacemaker, you will get more specific information about it. Be sure to: · Check your pulse as your doctor tells you. · Have your pacemaker checked as often as your doctor recommends. You may be able to do this over the phone or computer. · Avoid strong magnetic or electrical fields. These include MRIs, welding equipment, and generators. · You will be checked several times right after you get your pacemaker and when it is time to have the battery changed. Batteries last for 5 to 15 years. · You can talk on a cell phone. But keep it 6 inches away from your pacemaker. · Microwaves, TVs, radios, and kitchen and bathroom appliances won't harm you. When should you call for help? Call 911 anytime you think you may need emergency care. For example, call if: 
  · You have symptoms of sudden heart failure. These may include: ¨ Severe trouble breathing. ¨ A fast or irregular heartbeat. ¨ Coughing up pink, foamy mucus. ¨ You passed out.  
  · You have symptoms of a stroke. These may include: 
¨ Sudden numbness, tingling, weakness, or loss of movement in your face, arm, or leg, especially on only one side of your body. ¨ Sudden vision changes. ¨ Sudden trouble speaking. ¨ Sudden confusion or trouble understanding simple statements. ¨ Sudden problems with walking or balance. ¨ A sudden, severe headache that is different from past headaches.  
 Call your doctor now or seek immediate medical care if: 
  · You have new or changed symptoms of heart failure, such as: ¨ New or increased shortness of breath. ¨ New or worse swelling in your legs, ankles, or feet. ¨ Sudden weight gain, such as more than 2 to 3 pounds in a day or 5 pounds in a week. (Your doctor may suggest a different range of weight gain.) ¨ Feeling dizzy or lightheaded or like you may faint. ¨ Feeling so tired or weak that you cannot do your usual activities. ¨ Not sleeping well. Shortness of breath wakes you at night. You need extra pillows to prop yourself up to breathe easier.  
 Watch closely for changes in your health, and be sure to contact your doctor if:   · You do not get better as expected. Where can you learn more? Go to http://neeru-palmer.info/. Enter R877 in the search box to learn more about \"Bradycardia: Care Instructions. \" Current as of: December 6, 2017 Content Version: 11.7 © 9972-6995 [facility]. Care instructions adapted under license by [employer]) (which disclaims liability or warranty for this information). If you have questions about a medical condition or this instruction, always ask your healthcare professional. [address] any warranty or liability for your use of this information. Please provide this summary of care documentation to your next provider. Signatures-by signing, you are acknowledging that this After Visit Summary has been reviewed with you and you have received a copy. Patient Signature:  ____________________________________________________________ Date:  ____________________________________________________________  
  
Neymar Cox Provider Signature:  ____________________________________________________________ Date:  ____________________________________________________________

## 2018-07-17 NOTE — ED PROVIDER NOTES
EMERGENCY DEPARTMENT HISTORY AND PHYSICAL EXAM    6:20 PM      Date: 7/17/2018  Patient Name: Braden Palacios    History of Presenting Illness     Chief Complaint   Patient presents with    Syncope         History Provided By: Patient and EMS    Additional History (Context): Braden Palacios is a 80 y.o. female with a PMHx of HTN and TIA who presents via EMS with acute, moderate syncope onset x hours with associated dizziness and nausea. Pt was at a cook out with family while she was sitting down and passed out for about 5 seconds. Pt reports she felt like something was going to happen and has never felt like this before. EMS reports a-fib during transport. Denies SOB, CP, and abd pain. No further concerns or complaints at this time. PCP: Meaghan Mccarthy MD    Chief Complaint: Syncope  Duration:  Hours  Timing:  Acute  Location: N/A  Quality: N/A  Severity: Moderate  Modifying Factors: N/A  Associated Symptoms: Dizziness and nausea      Current Outpatient Prescriptions   Medication Sig Dispense Refill    acetaminophen (TYLENOL) 325 mg tablet Take 2 Tabs by mouth every four (4) hours as needed for Pain. 20 Tab 0    losartan (COZAAR) 100 mg tablet Take 100 mg by mouth daily.  acebutolol (SECTRAL) 200 mg capsule Take  by mouth two (2) times a day.  atorvastatin (LIPITOR) 20 mg tablet Take  by mouth daily. Past History     Past Medical History:  Past Medical History:   Diagnosis Date    Hypertension        Past Surgical History:  History reviewed. No pertinent surgical history. Family History:  History reviewed. No pertinent family history. Social History:  Social History   Substance Use Topics    Smoking status: Never Smoker    Smokeless tobacco: Never Used    Alcohol use No       Allergies:   Allergies   Allergen Reactions    Other Food Hives    Chlorhexidine Other (comments)     Hives and swelling on contact      Lisinopril Cough    Other Medication Hives     Most blood pressure meds Review of Systems     Review of Systems   Respiratory: Negative for shortness of breath. Cardiovascular: Negative for chest pain. Gastrointestinal: Positive for nausea. Negative for abdominal pain. Neurological: Positive for dizziness and syncope. All other systems reviewed and are negative. Physical Exam   There were no vitals taken for this visit. Physical Exam   Constitutional: She is oriented to person, place, and time. She appears well-developed. HENT:   Head: Normocephalic and atraumatic. Eyes: EOM are normal. Pupils are equal, round, and reactive to light. Neck: Normal range of motion. Neck supple. Cardiovascular: Normal rate, regular rhythm and normal heart sounds. Exam reveals no friction rub. No murmur heard. Pulmonary/Chest: Effort normal and breath sounds normal. No respiratory distress. She has no wheezes. Abdominal: Soft. She exhibits no distension. There is no tenderness. There is no rebound and no guarding. Musculoskeletal: Normal range of motion. Neurological: She is alert and oriented to person, place, and time. Skin: Skin is warm and dry. Psychiatric: She has a normal mood and affect. Her behavior is normal. Thought content normal.         Diagnostic Study Results       EKG shows sinus at 71 with a first-degree AV block at 224 otherwise normal intervals. There is no ST elevation or depression or hypertrophy  cxr pending. No CT head; pt did not fall; sycnope while sitting; family caught her  cxr napd. No anticoag; nsr.     Medical Decision Making    patient presents with syncope. She was sitting she has been intermittently feeling some palpitations. She notes some nausea and no chest pain. No shortness of breath. She does have some shortness of breath on exertion. EMS notes A. Fib with rates dropping down into the 30s as she is now likely in sinus rhythm this may represent sick sinus.   She has not seen a cardiologist will discuss with [facility] for admission    D/w dr Inder Boyle   D/w Residents       Diagnosis     No diagnosis found. _______________________________    Attestations:  Scribe Attestation     Ilan Lomax acting as a scribe for and in the presence of Svitlana Clark MD      July 17, 2018 at [facility] PM       Provider Attestation:      I personally performed the services described in the documentation, reviewed the documentation, as recorded by the scribe in my presence, and it accurately and completely records my words and actions.  July 17, 2018 at 6:20 PM - Svitlana Clark MD    _______________________________

## 2018-07-17 NOTE — ED TRIAGE NOTES
Brought in via EMS c/o syncopal episode. Pt uncouncious for approx. 5 seconds. C/o dizziness and Nausea, as well as hx of TIA. AFib 30-80. /90, now 150/88.98% RA.

## 2018-07-17 NOTE — ED NOTES
Bedside shift change report given to Fariha Salgado  (oncoming nurse) by Vinny Serrano RN (offgoing nurse). Report included the following information SBAR, ED Summary, MAR and Recent Results.

## 2018-07-18 ENCOUNTER — APPOINTMENT (OUTPATIENT)
Dept: CT IMAGING | Age: 83
DRG: 310 | End: 2018-07-18
Attending: STUDENT IN AN ORGANIZED HEALTH CARE EDUCATION/TRAINING PROGRAM
Payer: MEDICARE

## 2018-07-18 ENCOUNTER — APPOINTMENT (OUTPATIENT)
Dept: NON INVASIVE DIAGNOSTICS | Age: 83
DRG: 310 | End: 2018-07-18
Attending: STUDENT IN AN ORGANIZED HEALTH CARE EDUCATION/TRAINING PROGRAM
Payer: MEDICARE

## 2018-07-18 VITALS
OXYGEN SATURATION: 97 % | TEMPERATURE: 98.3 F | HEART RATE: 67 BPM | DIASTOLIC BLOOD PRESSURE: 68 MMHG | RESPIRATION RATE: 18 BRPM | BODY MASS INDEX: 31.91 KG/M2 | SYSTOLIC BLOOD PRESSURE: 111 MMHG | HEIGHT: 58 IN | WEIGHT: 152 LBS

## 2018-07-18 PROBLEM — I10 HTN (HYPERTENSION): Status: ACTIVE | Noted: 2018-07-18

## 2018-07-18 PROBLEM — M32.9 SLE (SYSTEMIC LUPUS ERYTHEMATOSUS) (HCC): Status: ACTIVE | Noted: 2018-07-18

## 2018-07-18 PROBLEM — G50.0 TRIGEMINAL NEURALGIA: Status: ACTIVE | Noted: 2018-07-18

## 2018-07-18 LAB
ANION GAP SERPL CALC-SCNC: 8 MMOL/L (ref 3–18)
ATRIAL RATE: 66 BPM
ATRIAL RATE: 71 BPM
BUN SERPL-MCNC: 23 MG/DL (ref 7–18)
BUN/CREAT SERPL: 31 (ref 12–20)
CALCIUM SERPL-MCNC: 8.1 MG/DL (ref 8.5–10.1)
CALCULATED P AXIS, ECG09: 30 DEGREES
CALCULATED P AXIS, ECG09: 41 DEGREES
CALCULATED R AXIS, ECG10: -24 DEGREES
CALCULATED R AXIS, ECG10: -39 DEGREES
CALCULATED T AXIS, ECG11: 22 DEGREES
CALCULATED T AXIS, ECG11: 42 DEGREES
CARBAMAZEPINE SERPL-MCNC: <0.5 UG/ML (ref 4–12)
CHLORIDE SERPL-SCNC: 112 MMOL/L (ref 100–108)
CK MB CFR SERPL CALC: 0.6 % (ref 0–4)
CK MB CFR SERPL CALC: 0.7 % (ref 0–4)
CK MB SERPL-MCNC: 1.4 NG/ML (ref 5–25)
CK MB SERPL-MCNC: 1.7 NG/ML (ref 5–25)
CK SERPL-CCNC: 223 U/L (ref 26–192)
CK SERPL-CCNC: 228 U/L (ref 26–192)
CO2 SERPL-SCNC: 23 MMOL/L (ref 21–32)
CREAT SERPL-MCNC: 0.74 MG/DL (ref 0.6–1.3)
DIAGNOSIS, 93000: NORMAL
DIAGNOSIS, 93000: NORMAL
ECHO LA AREA 4C: 19.5 CM2
ECHO LA VOL 2C: 38.42 ML (ref 22–52)
ECHO LA VOL 4C: 48.94 ML (ref 22–52)
ECHO LA VOLUME INDEX A2C: 23.7 ML/M2
ECHO LA VOLUME INDEX A4C: 30.2 ML/M2
ECHO LV E' LATERAL VELOCITY: 7.4 CM/S
ECHO LV INTERNAL DIMENSION DIASTOLIC: 4.27 CM (ref 3.9–5.3)
ECHO LV INTERNAL DIMENSION SYSTOLIC: 2.73 CM
ECHO LV IVSD: 0.88 CM (ref 0.6–0.9)
ECHO LV MASS 2D: 154.6 G (ref 67–162)
ECHO LV MASS INDEX 2D: 95.4 G/M2
ECHO LV POSTERIOR WALL DIASTOLIC: 1.06 CM (ref 0.6–0.9)
ECHO LVOT DIAM: 2.07 CM
ECHO LVOT PEAK GRADIENT: 3.1 MMHG
ECHO LVOT PEAK VELOCITY: 88.59 CM/S
ECHO LVOT VTI: 20.25 CM
ECHO MV A VELOCITY: 94.94 CM/S
ECHO MV E DECELERATION TIME (DT): 282.7 MS
ECHO MV E VELOCITY: 0.68 CM/S
ECHO MV E/A RATIO: 0.7
ECHO MV E/E' LATERAL: 9.2
ECHO TV REGURGITANT MAX VELOCITY: 211.62 CM/S
ECHO TV REGURGITANT PEAK GRADIENT: 17.9 MMHG
GLUCOSE SERPL-MCNC: 90 MG/DL (ref 74–99)
P-R INTERVAL, ECG05: 224 MS
P-R INTERVAL, ECG05: 240 MS
POTASSIUM SERPL-SCNC: 3.9 MMOL/L (ref 3.5–5.5)
Q-T INTERVAL, ECG07: 388 MS
Q-T INTERVAL, ECG07: 422 MS
QRS DURATION, ECG06: 72 MS
QRS DURATION, ECG06: 78 MS
QTC CALCULATION (BEZET), ECG08: 421 MS
QTC CALCULATION (BEZET), ECG08: 442 MS
SODIUM SERPL-SCNC: 143 MMOL/L (ref 136–145)
T4 FREE SERPL-MCNC: 0.9 NG/DL (ref 0.7–1.5)
TROPONIN I SERPL-MCNC: <0.02 NG/ML (ref 0–0.04)
TROPONIN I SERPL-MCNC: <0.02 NG/ML (ref 0–0.04)
TSH SERPL DL<=0.05 MIU/L-ACNC: 3.94 UIU/ML (ref 0.36–3.74)
VENTRICULAR RATE, ECG03: 66 BPM
VENTRICULAR RATE, ECG03: 71 BPM

## 2018-07-18 PROCEDURE — 74011250637 HC RX REV CODE- 250/637: Performed by: STUDENT IN AN ORGANIZED HEALTH CARE EDUCATION/TRAINING PROGRAM

## 2018-07-18 PROCEDURE — 84439 ASSAY OF FREE THYROXINE: CPT

## 2018-07-18 PROCEDURE — 93005 ELECTROCARDIOGRAM TRACING: CPT

## 2018-07-18 PROCEDURE — 36415 COLL VENOUS BLD VENIPUNCTURE: CPT

## 2018-07-18 PROCEDURE — 80048 BASIC METABOLIC PNL TOTAL CA: CPT

## 2018-07-18 PROCEDURE — 93306 TTE W/DOPPLER COMPLETE: CPT

## 2018-07-18 PROCEDURE — 84443 ASSAY THYROID STIM HORMONE: CPT

## 2018-07-18 PROCEDURE — 80156 ASSAY CARBAMAZEPINE TOTAL: CPT | Performed by: FAMILY MEDICINE

## 2018-07-18 PROCEDURE — 82550 ASSAY OF CK (CPK): CPT

## 2018-07-18 PROCEDURE — 74011250637 HC RX REV CODE- 250/637

## 2018-07-18 PROCEDURE — 74011250636 HC RX REV CODE- 250/636: Performed by: STUDENT IN AN ORGANIZED HEALTH CARE EDUCATION/TRAINING PROGRAM

## 2018-07-18 PROCEDURE — 70450 CT HEAD/BRAIN W/O DYE: CPT

## 2018-07-18 RX ORDER — MONTELUKAST SODIUM 10 MG/1
10 TABLET ORAL AS NEEDED
COMMUNITY

## 2018-07-18 RX ORDER — HEPARIN SODIUM 5000 [USP'U]/ML
5000 INJECTION, SOLUTION INTRAVENOUS; SUBCUTANEOUS EVERY 8 HOURS
Status: DISCONTINUED | OUTPATIENT
Start: 2018-07-18 | End: 2018-07-18

## 2018-07-18 RX ORDER — MONTELUKAST SODIUM 10 MG/1
TABLET ORAL
Status: COMPLETED
Start: 2018-07-18 | End: 2018-07-18

## 2018-07-18 RX ORDER — ASPIRIN 325 MG
975 TABLET ORAL 3 TIMES DAILY
COMMUNITY

## 2018-07-18 RX ORDER — AMLODIPINE BESYLATE 5 MG/1
5 TABLET ORAL DAILY
Qty: 30 TAB | Refills: 0 | Status: SHIPPED | OUTPATIENT
Start: 2018-07-19 | End: 2018-08-07 | Stop reason: ALTCHOICE

## 2018-07-18 RX ORDER — LORAZEPAM 0.5 MG/1
TABLET ORAL
COMMUNITY

## 2018-07-18 RX ORDER — ATORVASTATIN CALCIUM 10 MG/1
10 TABLET, FILM COATED ORAL DAILY
Status: DISCONTINUED | OUTPATIENT
Start: 2018-07-18 | End: 2018-07-18 | Stop reason: HOSPADM

## 2018-07-18 RX ORDER — CARBAMAZEPINE 100 MG/1
100 CAPSULE, EXTENDED RELEASE ORAL 2 TIMES DAILY
COMMUNITY

## 2018-07-18 RX ORDER — MONTELUKAST SODIUM 10 MG/1
10 TABLET ORAL
Status: DISCONTINUED | OUTPATIENT
Start: 2018-07-18 | End: 2018-07-18 | Stop reason: HOSPADM

## 2018-07-18 RX ORDER — CARBAMAZEPINE 100 MG/1
100 TABLET, EXTENDED RELEASE ORAL EVERY 12 HOURS
Status: DISCONTINUED | OUTPATIENT
Start: 2018-07-18 | End: 2018-07-18 | Stop reason: HOSPADM

## 2018-07-18 RX ORDER — HEPARIN SODIUM 5000 [USP'U]/ML
5000 INJECTION, SOLUTION INTRAVENOUS; SUBCUTANEOUS EVERY 8 HOURS
Status: DISCONTINUED | OUTPATIENT
Start: 2018-07-18 | End: 2018-07-18 | Stop reason: HOSPADM

## 2018-07-18 RX ORDER — AMLODIPINE BESYLATE 5 MG/1
5 TABLET ORAL DAILY
Status: DISCONTINUED | OUTPATIENT
Start: 2018-07-19 | End: 2018-07-18 | Stop reason: HOSPADM

## 2018-07-18 RX ORDER — LOSARTAN POTASSIUM 50 MG/1
100 TABLET ORAL DAILY
Status: DISCONTINUED | OUTPATIENT
Start: 2018-07-18 | End: 2018-07-18 | Stop reason: HOSPADM

## 2018-07-18 RX ADMIN — LOSARTAN POTASSIUM 100 MG: 50 TABLET ORAL at 09:55

## 2018-07-18 RX ADMIN — HEPARIN SODIUM 5000 UNITS: 5000 INJECTION, SOLUTION INTRAVENOUS; SUBCUTANEOUS at 10:03

## 2018-07-18 RX ADMIN — ATORVASTATIN CALCIUM 10 MG: 10 TABLET, FILM COATED ORAL at 09:55

## 2018-07-18 RX ADMIN — MONTELUKAST SODIUM: 10 TABLET, FILM COATED ORAL at 03:00

## 2018-07-18 RX ADMIN — MONTELUKAST SODIUM 10 MG: 10 TABLET, FILM COATED ORAL at 03:00

## 2018-07-18 NOTE — ROUTINE PROCESS
Bedside and Verbal shift change report given to Tung Reynolds (oncoming nurse) by Pierce Marquez (offgoing nurse). Report included the following information SBAR, Kardex, Intake/Output, MAR and Cardiac Rhythm NSR 1 degree AV block.

## 2018-07-18 NOTE — PROGRESS NOTES
Problem: Falls - Risk of  Goal: *Absence of Falls  Document Tammy Fall Risk and appropriate interventions in the flowsheet.    Outcome: Progressing Towards Goal  Fall Risk Interventions:            Medication Interventions: Assess postural VS orthostatic hypotension, Bed/chair exit alarm, Evaluate medications/consider consulting pharmacy, Patient to call before getting OOB, Teach patient to arise slowly                  Comments: Non skid footwear

## 2018-07-18 NOTE — H&P
Admission History and Physical 
[facility] Patient: Valerie Smyth MRN: [id number]  CSN: [id number] YOB: 1933  Age: 80 y.o. Sex: female DOA: 7/17/2018 HPI:  
 
Valerie Smyth is an 80 y.o. female with PMH  IBS, insomnia, SLE, chronic low back pain sciatica, depression, HLD, HTN, allergic rhinitis, trigeminal neuralgia, HSV, and fibromyalgia, now presenting with complaint of syncope. Patient was brought in by EMS after passing out for 5 seconds while sitting outside on the porch at a cookout. Patient reports that she felt a sensation that she was going to faint so she yelled to her daughter prior to fainting. They are unsure when the event happened. She reported dizziness, nausea, SOB, and chest discomfort. Denies CP, jerking limb motion, tongue biting, incontinence of urine or stool, head trauma. On Thursday, prior to this event, patient reported visual changes, headaches, numbness in her left 2nd and 3rd digits, and SOB while walking. She made an appointment with her PCP, but her syncopal event happened today. BP was 202/90 and EMS noted she was in afib with rate of 30-80 during transport. In ED, EKG showed sinus rhythm with 1st degree block. Compared to previous EKG on 4/3/12, CT interval has increased. Troponin was negative. ED Course: EKG showed sinus rhythm with 1st degree block. Compared to previous EKG on 4/3/12, CT interval has increased. Admitted to [facility]. Review of Systems - General ROS: negative for  - chills, fever, night sweats, weight gain and weight loss Psychological ROS: negative for - anxiety and depression Ophthalmic ROS:  Positive for visual changes ENT ROS: Positive for headaches, visual changes negative for - hearing change Hematological and Lymphatic ROS: negative for - bruising, jaundice Respiratory ROS: Positive for SOB negative for - cough, hemoptysis, orthopnea, paroxysmal dyspnea, or wheezing Cardiovascular ROS: Positive for dyspnea on exertion, loss of consciousness, and palpitations negative for - chest pain Gastrointestinal ROS:  Positive for nausea,  negative for - abdominal pain, blood in stools, change in stools, constipation, diarrhea, hematemesis, melena,vomiting or swallowing difficulty/pain Musculoskeletal ROS: negative for - joint pain, joint swelling or muscle pain Neurological ROS: Positive for headaches, numbness in left fingers and dizziness, negative for - tingling or weakness Dermatological ROS: negative for - rash or skin lesion changes Past Medical History:  
Diagnosis Date  Hypertension History reviewed. No pertinent surgical history. History reviewed. No pertinent family history. Social History Social History  Marital status:  Spouse name: N/A  
 Number of children: N/A  
 Years of education: N/A Social History Main Topics  Smoking status: Never Smoker  Smokeless tobacco: Never Used  Alcohol use No  
 Drug use: None  Sexual activity: Not Asked Other Topics Concern  None Social History Narrative Allergies Allergen Reactions  Other Food Hives  Chlorhexidine Other (comments) Hives and swelling on contact  Lisinopril Cough  Other Medication Hives Most blood pressure meds Prior to Admission Medications Prescriptions Last Dose Informant Patient Reported? Taking?  
acebutolol (SECTRAL) 200 mg capsule   Yes No  
Sig: Take  by mouth two (2) times a day. acetaminophen (TYLENOL) 325 mg tablet   No No  
Sig: Take 2 Tabs by mouth every four (4) hours as needed for Pain. atorvastatin (LIPITOR) 20 mg tablet   Yes No  
Sig: Take  by mouth daily. losartan (COZAAR) 100 mg tablet   Yes No  
Sig: Take 100 mg by mouth daily. Facility-Administered Medications: None Physical Exam:  
 
Patient Vitals for the past 24 hrs: 
 Temp Pulse Resp BP  
07/17/18 1930 97 °F (36.1 °C) 71 16 137/53  
07/17/18 1915 - 71 17 141/61  
07/17/18 1845 - 72 14 154/66  
07/17/18 1831 - 76 21 - Physical Exam:  
General:  Alert and Responsive and in No acute distress. Alert and oriented. Appears well, lying supine in bed watching TV. In no acute distress. HEENT: Conjunctiva pink, sclera anicteric. EOMI. Pharynx moist, nonerythematous. Moist mucous membranes. No cervical, supraclavicular, occipital or submandibular lymphadenopathy. No other gross abnormalities present. CV:  RRR, no murmurs. No visible pulsations or thrills. RESP:  Unlabored breathing. Lungs clear to auscultation. no wheeze, rales, or rhonchi. Equal expansion bilaterally. ABD:  Soft, nontender, nondistended. No hepatosplenomegaly. No suprapubic tenderness. MS:  No joint deformity or instability. No atrophy. Neuro:  Cranial nerves intact. 5/5 strength bilateral upper and lower extremities. Sensation intact Ext:  No edema. 2+ radial and dp pulses bilaterally. Skin:  No rashes, lesions, or ulcers. Good turgor. IMAGING:  
Xr Chest [place] Result Date: 7/18/2018 IMPRESSION: 1. No acute findings. Recent Results (from the past 12 hour(s)) EKG, 12 LEAD, INITIAL Collection Time: 07/17/18  6:26 PM  
Result Value Ref Range Ventricular Rate 71 BPM  
 Atrial Rate 71 BPM  
 P-R Interval 224 ms QRS Duration 72 ms Q-T Interval 388 ms QTC Calculation (Bezet) 421 ms Calculated P Axis 41 degrees Calculated R Axis -24 degrees Calculated T Axis 22 degrees Diagnosis Sinus rhythm with 1st degree AV block Minimal voltage criteria for LVH, may be normal variant Borderline ECG When compared with ECG of 03-APR-2012 18:53, 
HI interval has increased CBC WITH AUTOMATED DIFF Collection Time: 07/17/18  6:39 PM  
Result Value Ref Range WBC 8.8 4.6 - 13.2 K/uL  
 RBC 3.83 (L) 4.20 - 5.30 M/uL  
 HGB 12.3 12.0 - 16.0 g/dL HCT 35.2 35.0 - 45.0 %  MCV 91.9 74.0 - 97.0 FL  
 MCH 32.1 24.0 - 34.0 PG  
 MCHC 34.9 31.0 - 37.0 g/dL  
 RDW 12.2 11.6 - 14.5 % PLATELET 038 539 - 727 K/uL MPV 9.0 (L) 9.2 - 11.8 FL  
 NEUTROPHILS 68 40 - 73 % LYMPHOCYTES 16 (L) 21 - 52 % MONOCYTES 11 (H) 3 - 10 % EOSINOPHILS 4 0 - 5 % BASOPHILS 1 0 - 2 %  
 ABS. NEUTROPHILS 6.1 1.8 - 8.0 K/UL  
 ABS. LYMPHOCYTES 1.4 0.9 - 3.6 K/UL  
 ABS. MONOCYTES 1.0 0.05 - 1.2 K/UL  
 ABS. EOSINOPHILS 0.3 0.0 - 0.4 K/UL  
 ABS. BASOPHILS 0.0 0.0 - 0.06 K/UL  
 DF AUTOMATED METABOLIC PANEL, COMPREHENSIVE Collection Time: 07/17/18  6:39 PM  
Result Value Ref Range Sodium 141 136 - 145 mmol/L Potassium 4.4 3.5 - 5.5 mmol/L Chloride 112 (H) 100 - 108 mmol/L  
 CO2 21 21 - 32 mmol/L Anion gap 8 3.0 - 18 mmol/L Glucose 101 (H) 74 - 99 mg/dL BUN 25 (H) 7.0 - 18 MG/DL Creatinine 0.84 0.6 - 1.3 MG/DL  
 BUN/Creatinine ratio 30 (H) 12 - 20 GFR est AA >60 >60 ml/min/1.73m2 GFR est non-AA >60 >60 ml/min/1.73m2 Calcium 9.0 8.5 - 10.1 MG/DL Bilirubin, total 0.1 (L) 0.2 - 1.0 MG/DL  
 ALT (SGPT) 20 13 - 56 U/L  
 AST (SGOT) 24 15 - 37 U/L Alk. phosphatase 123 (H) 45 - 117 U/L Protein, total 6.5 6.4 - 8.2 g/dL Albumin 3.4 3.4 - 5.0 g/dL Globulin 3.1 2.0 - 4.0 g/dL A-G Ratio 1.1 0.8 - 1.7 CARDIAC PANEL,(CK, CKMB & TROPONIN) Collection Time: 07/17/18  6:39 PM  
Result Value Ref Range  (H) 26 - 192 U/L  
 CK - MB 1.9 <3.6 ng/ml CK-MB Index 0.6 0.0 - 4.0 % Troponin-I, Qt. <0.02 0.0 - 0.045 NG/ML Assessment/Plan:  
80 y.o. female with PMH IBS, insomnia, SLE, chronic low back pain sciatica, depression, HLD, HTN, allergic rhinitis, trigeminal neuralgia, HSV, and fibromyalgia, now admitted with palpitations and syncope. DDx include arrhythmia, MI, medication side effects, or structural heart abnormalities. Low likelihood of PE because of normal HR and pulse ox. Low likelihood of seizure given hx and carbamazepine.   
 
Syncope/Palpitations: Patient was brought in by EMS after passing out for 5 seconds while sitting. They are unsure when the event happened. She reported dizziness and nausea. BP was 202/90 and EMS noted she was in afib with rate of 30-80 during transport. In ED, EKG showed sinus rhythm with 1st degree block. Compared to previous EKG on 4/3/12, OK interval has increased. - Admit to telemetry - CT head - Consult cardiology in AM 
- Trend troponin q6r x2 
- BMP daily - Repeat TSH 
- Check carbamezapine level - Echo - Repeat EKG in AM 
- Fall Precaution 
- Seizure Precaution - Aspiration Precaution 
- Hold home acebutolol 200 mg because of bradycardia with EMS 
 
SLE w/nephritis - Daily BMP Chronic Pain/Osteoarthritis/Trigeminal Neuralgia/Fibromyalgia 
- Continue home carbamazepine ER 100mg 
- Continue home tramadol 50 mg q6h HTN/HLD 
- Continue home atorvastatin 10 mg daily 
- Continue home losartan 100 mg daily Depression 
- Hold home lorazepam 0.5mg daily. Patient is not taking. Chronic Rhinitis: 
- Continue home Singulair 10mg daily   
- Continue home Nasalcrom spray daily Diet: Cardiac DVT Prophylaxis: Heparin Code Status: DNR Point of Contact: Maitejeffery Butt (Relationship: Daughter: [phone]) Disposition and anticipated LOS: Admit >2 midnights Alexander Sumner MD  
[facility] PGY-1 
07/17/18 10:00 PM 
 
 
 
Alexandru Farrell MD Resident [facility] H&P Date of Service: 07/17/18 2200 []Hide copied text []Hover for attribution information 
 
  
Senior Addendum to History and Physical 
[address] [place] [address] 
  
  
Patient: Siva German MRN: [id number]  CSN: [id number] YOB: 1933  Age: 80 y.o.   Sex: female   
  
DOA: 7/17/2018 
   
HPI:  
  
Siva German is a 80 y.o. female with PMH IBS, insomnia, SLE, chronic low back pain with sciatica, depression, HLD, HTN, allergic rhinitis, fibromyalgia , now presenting with complaint of syncope. 
  
Patient reports she was at a cookout with her friends. Family friend at bedside with patient. Family friend reports patient called out from where she was sitting. Her eyes \"rolled back\" and the patient \"went limp\". Per witnesses, she was only unconscious for 5 seconds. She was briefly confused upon awakening. However no incontinence, abnormal jerking, tongue biting noted. Patient stated she felt \"terrible, like something was about to happen\" prior to passing out. Over the past 2 weeks she has had intermittent palpitations and SOB. She has no Hx of heart problems. She also reports an episode of headache and hand numbness last week which lasted only a few hours prior to spontaneously resolving.  
  
EMS noted the patient to have high blood pressure and a-fib with HR in the 30s-50s. However she was in NSR on arrival to the ED 
  
ED course: CBC WNL BMP WNL Troponin negative x 1 EKG with 1st degree AV block HR 71  
  
  
HPI, ROS, PMH, PSH, Family Hx, Social Hx, Home medications, and allergies as above in intern H&P. Which has been reviewed in full. Additional comments to subjective history include: 
  
Physical Exam:  
  
Physical Exam: 
General:  Alert and Responsive and in No acute distress. HEENT: Conjunctiva pink, sclera anicteric. PERRL. EOMI. Pharynx moist, nonerythematous. Moist mucous membranes. Thyroid not enlarged, no nodules. No cervical, supraclavicular, occipital or submandibular lymphadenopathy. No other gross abnormalities present. CV:  RRR, no murmurs. PMI not displaced. No visible pulsations or thrills. No carotid bruits. RESP:  Unlabored breathing. Lungs clear to auscultation. no wheeze, rales, or rhonchi. Equal expansion bilaterally. ABD:  Soft, nontender, nondistended. Normoactive bowel sounds. No hepatosplenomegaly. No suprapubic tenderness. No CVA tenderness. MS:  No joint deformity or instability. No atrophy.  
Neuro:  5/5 strength bilateral upper extremities and lower extremities. CN II-XII intact. Sensation grossly intact. 2+ patellar reflexes bilaterally. A+Ox3. Ext:  No edema. 2+ radial and dp pulses bilaterally. Skin:  No rashes, lesions, or ulcers. Good turgor. 
  
Labs and imaging reviewed  
  
Assessment/Plan:  
80 y.o. female with PMH IBS, insomnia, SLE, chronic low back pain with sciatica, depression, HLD, HTN, allergic rhinitis, fibromyalgia now admitted with palpitations and syncope.  
  
1. Palpitations and syncope - patient had syncopal episode earlier today while at a cookout with her family. No seizure like activity noted EMS noted a-fib on transport no EKG available. She was apparently bradycardic with HR down to the 30. EKG in the ED showed HR 71 with first degree AV block, . No electrolyte abnormalities noted on labs. Patient has no cardiac history. She does have HTN. Review of outpatient records also notes recent elevated TSH to 4.7 and subsequent biopsy of thyroid nodule which noted benign findings. DDx includes A-fib, sick sinus syndrome, heart block, ACS, medication side effect. Seizure less likely with normal neuro exam and no seizure activity - Admit to tele - Cardiology consult tomorrow AM 
- Repeat EKG tomorrow AM  
- Trend troponin x 3  
- Repeat TSH 
- CT head WO contrast in light of neuro Sx 
- Check carbamezapine level  
- Daily BMP  
- Hold home  mg TID 
- Fall, Aspiration, and seizure precautions - Heparin for DVT phrophylaxis don't start until after CT head is negative - Cardiac diet  
- hold home acebutolol 200 mg daily with recent syncope and possible bradycardia  
  
HTN/HLD - BP in the ED stable and well controlled.  Last lipid profile in 2015 with Tchol 184, HDL 51, LDL 94, Triglycerides 197.  
- Continue home losartan 100 mg every day 
- continue home atorvastatin 10 mg daily  
  
SLE with history of lupus nephritis - noted on problem list, kidney function stable with creatinine at baseline 0.7-0.9.  
- Monitor kidney function with daily BMP  
  
Chronic pain/osteoarthritis/fibromyalgia/trigeminal neuralgia 
- Continue home carbamazepine  XR Q12 hrs 
  
Anxiety/Depression - No SSRIs in med list 
- hold home lorazepam 0.5 mg daily, as patient \"hardly ever\" takes this medication 
  
Allergies - seasonal  
- Continue home nasalcrom spray TID  
- Continue home singulair 10 mg daily 
  
Active Problems: 
  Syncope (7/17/2018)   
  
  
For additional problem list, assessment, and plan see intern note 
  
Domenic Garcia MD 
7/17/2018, 10:00 PM 
  
  
  
   
   
   
  
Revision History    
  Date/Time User Provider Type Action  
  07/18/18 [id number] Domenic Garcia MD Resident Share  
  07/17/18 [id number] Domenic Garcia MD Resident Share  
  View Details Report

## 2018-07-18 NOTE — CONSULTS
[facility] - Consult Note    Date of  Admission: 7/17/2018  6:12 PM     Primary Care Physician:  Casi Schafer MD     Plan:     1. Syncope - possibly related to bradycardia or AFib, head CT with no acute cranial process, Trop I neg x 3, EKG SR with first degree AVB  2. AFib - reported per EMS rates 30-80- EKG and Tele SR, was taking Sectral at home 100 mg BID - Chads2 Vasc 4 - however she takes 12 ~ 325  Mg. Aspirin daily and has for 40 + years. Continue to hold BB. Will need event monitor at d/c. Plan for o/p NST. Check orthostatic  1. Echo with EF 65%, mild mitral calcification. 3. Hypertension - controlled with losartan  4. Hypothyroidism - TSH 3.94, Free T4 0.9  5. SLE    Patient with transient symptomatic bradycardia- which has resolved. Off BB. Use norvasc for htn. Will obtain cardiac monitoring as outpatient. Discussed plan with patient and medical team     Assessment:     Hospital Problems  Never Reviewed          Codes Class Noted POA    HTN (hypertension) ICD-10-CM: I10  ICD-9-CM: 401.9  7/18/2018 Unknown        Trigeminal neuralgia ICD-10-CM: G50.0  ICD-9-CM: 350.1  7/18/2018 Unknown        SLE (systemic lupus erythematosus) ([facility] ICD-10-CM: M32.9  ICD-9-CM: 710.0  7/18/2018 Unknown        * (Principal)Syncope ICD-10-CM: R55  ICD-9-CM: 780.2  7/17/2018 Unknown                   History of Present Illness:   Ms. German Lundy is a 81 y/o female seen in consult for syncope. She has no prior cardiac history. She had stress test 10+ years ago which was normal. She has PMH of SLE, hypertension, HLD, anxiety and depression. Ms. German Lundy also has h/o arthritis and takes 12 tabs of 325 mg enteric coated aspirin daily and has for 40 + years. She has noted \"heart fluttering\" with SOB and tingling in fingers associated with exertion over the last 6 weeks. The symptoms resolve with rest. Ms. German Lundy denies chest pain, sob, orthopnea, edema.  Yesterday while at a cook out she became dizzy with SOB and palpitations and called out to friend, then passed out. She does not recall events after calling out. She was lowered to floor and was told lost consciousness for 5-10 seconds. EMS was called and she was noted to be in AFib rates 30-80s. In ED - EKG was SR. Past Medical History:     Past Medical History:   Diagnosis Date    Hypertension          Social History:     Social History     Social History    Marital status:      Spouse name: N/A    Number of children: N/A    Years of education: N/A     Social History Main Topics    Smoking status: Never Smoker    Smokeless tobacco: Never Used    Alcohol use No    Drug use: None    Sexual activity: Not Asked     Other Topics Concern    None     Social History Narrative        Family History:   History reviewed. No pertinent family history. Medications:      Allergies   Allergen Reactions    Other Food Hives    Chlorhexidine Other (comments)     Hives and swelling on contact      Lisinopril Cough    Other Medication Hives     Most blood pressure meds        Current Facility-Administered Medications   Medication Dose Route Frequency    atorvastatin (LIPITOR) tablet 10 mg  10 mg Oral DAILY    carBAMazepine XR (TEGretol XR) tablet 100 mg  100 mg Oral Q12H    losartan (COZAAR) tablet 100 mg  100 mg Oral DAILY    montelukast (SINGULAIR) tablet 10 mg  10 mg Oral QHS    heparin (porcine) injection 5,000 Units  5,000 Units SubCUTAneous Q8H        Review Of Systems:     Constitutional: No fever, no chills, no weight loss, no night sweats   HEENT: No epistaxis, no nasal drainage, no difficulty in swallowing, no redness in eyes  Respiratory: Respiratory: positive for cough, sputum, hemoptysis, pleurisy/chest pain, asthma or dyspnea on exertion, negative for cough, sputum, hemoptysis, pleurisy/chest pain, wheezing, dyspnea on exertion or emphysema  Cardiovascular: no chest pain, no chest pressure, no dyspnea, no pnd, no claudication no palpitations, no chronic leg edema, no syncope  Gastrointestinal: no abd pain, no vomiting, no diarrhea, no bleeding symptoms  Genitourinary: No urinary symptoms or hematuria  Integument/breast: No ulcers or rashes  Musculoskeletal: no muscle pain, no weakness  Neurological: No focal weakness, no seizures, no headaches  Behvioral/Psych: No anxiety, no depression             Physical Exam:     Visit Vitals    /70 (BP 1 Location: Left arm, BP Patient Position: Sitting)    Pulse 74    Temp 97.5 °F (36.4 °C)    Resp 18    Ht 4' 10\" (1.473 m)    Wt 68.9 kg (152 lb)    SpO2 97%    Breastfeeding No    BMI 31.77 kg/m2     BP Readings from Last 3 Encounters:   07/18/18 119/70   06/20/18 172/65   02/27/18 157/50     Pulse Readings from Last 3 Encounters:   07/18/18 74   06/20/18 74   02/27/18 90     Wt Readings from Last 3 Encounters:   07/18/18 68.9 kg (152 lb)   06/20/18 68.9 kg (152 lb)   02/27/18 68 kg (150 lb)       General:  alert, cooperative, no distress, appears stated age  Skin: Warm and dry, acyanotic, normal color. Head: Normocephalic, atraumatic. Eyes: Sclerae anicteric, conjunctivae without injection. Neck:  nontender, no nuchal rigidity, no masses, no stridor, no carotid bruit, JVD unable to assess due to obesity  Lungs:  clear to auscultation bilaterally, rhonchi , wheezes   Heart:  regular rate and rhythm, S1, S2 normal, no murmur, click, rub or gallop  Abdomen:  abdomen is soft without significant tenderness, masses, organomegaly or guarding  Extremities:  extremities normal, atraumatic, no cyanosis or edema  Neurological: grossly intact. No focal abnormalities, moves all extremities well. Psychiatric Affect: The patient is awake, alert and oriented x3. Abdiaziz Seton is interactive and appropriate.      Data Review:     Recent Results (from the past 48 hour(s))   EKG, 12 LEAD, INITIAL    Collection Time: 07/17/18  6:26 PM   Result Value Ref Range    Ventricular Rate 71 BPM    Atrial Rate 71 BPM    P-R Interval 224 ms QRS Duration 72 ms    Q-T Interval 388 ms    QTC Calculation (Bezet) 421 ms    Calculated P Axis 41 degrees    Calculated R Axis -24 degrees    Calculated T Axis 22 degrees    Diagnosis       Sinus rhythm with 1st degree AV block  Minimal voltage criteria for LVH, may be normal variant  Borderline ECG  When compared with ECG of 03-APR-2012 18:53,  ME interval has increased  Confirmed by Glen Riavs ([id number]) on 7/18/2018 8:31:53 AM     CBC WITH AUTOMATED DIFF    Collection Time: 07/17/18  6:39 PM   Result Value Ref Range    WBC 8.8 4.6 - 13.2 K/uL    RBC 3.83 (L) 4.20 - 5.30 M/uL    HGB 12.3 12.0 - 16.0 g/dL    HCT 35.2 35.0 - 45.0 %    MCV 91.9 74.0 - 97.0 FL    MCH 32.1 24.0 - 34.0 PG    MCHC 34.9 31.0 - 37.0 g/dL    RDW 12.2 11.6 - 14.5 %    PLATELET 862 676 - 763 K/uL    MPV 9.0 (L) 9.2 - 11.8 FL    NEUTROPHILS 68 40 - 73 %    LYMPHOCYTES 16 (L) 21 - 52 %    MONOCYTES 11 (H) 3 - 10 %    EOSINOPHILS 4 0 - 5 %    BASOPHILS 1 0 - 2 %    ABS. NEUTROPHILS 6.1 1.8 - 8.0 K/UL    ABS. LYMPHOCYTES 1.4 0.9 - 3.6 K/UL    ABS. MONOCYTES 1.0 0.05 - 1.2 K/UL    ABS. EOSINOPHILS 0.3 0.0 - 0.4 K/UL    ABS. BASOPHILS 0.0 0.0 - 0.06 K/UL    DF AUTOMATED     METABOLIC PANEL, COMPREHENSIVE    Collection Time: 07/17/18  6:39 PM   Result Value Ref Range    Sodium 141 136 - 145 mmol/L    Potassium 4.4 3.5 - 5.5 mmol/L    Chloride 112 (H) 100 - 108 mmol/L    CO2 21 21 - 32 mmol/L    Anion gap 8 3.0 - 18 mmol/L    Glucose 101 (H) 74 - 99 mg/dL    BUN 25 (H) 7.0 - 18 MG/DL    Creatinine 0.84 0.6 - 1.3 MG/DL    BUN/Creatinine ratio 30 (H) 12 - 20      GFR est AA >60 >60 ml/min/1.73m2    GFR est non-AA >60 >60 ml/min/1.73m2    Calcium 9.0 8.5 - 10.1 MG/DL    Bilirubin, total 0.1 (L) 0.2 - 1.0 MG/DL    ALT (SGPT) 20 13 - 56 U/L    AST (SGOT) 24 15 - 37 U/L    Alk.  phosphatase 123 (H) 45 - 117 U/L    Protein, total 6.5 6.4 - 8.2 g/dL    Albumin 3.4 3.4 - 5.0 g/dL    Globulin 3.1 2.0 - 4.0 g/dL    A-G Ratio 1.1 0.8 - 1.7     CARDIAC PANEL,(CK, CKMB & TROPONIN)    Collection Time: 07/17/18  6:39 PM   Result Value Ref Range     (H) 26 - 192 U/L    CK - MB 1.9 <3.6 ng/ml    CK-MB Index 0.6 0.0 - 4.0 %    Troponin-I, Qt. <0.02 0.0 - 0.045 NG/ML   T4, FREE    Collection Time: 07/18/18  3:05 AM   Result Value Ref Range    T4, Free 0.9 0.7 - 1.5 NG/DL   TSH 3RD GENERATION    Collection Time: 07/18/18  3:05 AM   Result Value Ref Range    TSH 3.94 (H) 0.36 - 3.74 uIU/mL   CARDIAC PANEL,(CK, CKMB & TROPONIN)    Collection Time: 07/18/18  3:05 AM   Result Value Ref Range     (H) 26 - 192 U/L    CK - MB 1.7 <3.6 ng/ml    CK-MB Index 0.7 0.0 - 4.0 %    Troponin-I, Qt. <0.02 0.0 - 1.866 NG/ML   METABOLIC PANEL, BASIC    Collection Time: 07/18/18  3:05 AM   Result Value Ref Range    Sodium 143 136 - 145 mmol/L    Potassium 3.9 3.5 - 5.5 mmol/L    Chloride 112 (H) 100 - 108 mmol/L    CO2 23 21 - 32 mmol/L    Anion gap 8 3.0 - 18 mmol/L    Glucose 90 74 - 99 mg/dL    BUN 23 (H) 7.0 - 18 MG/DL    Creatinine 0.74 0.6 - 1.3 MG/DL    BUN/Creatinine ratio 31 (H) 12 - 20      GFR est AA >60 >60 ml/min/1.73m2    GFR est non-AA >60 >60 ml/min/1.73m2    Calcium 8.1 (L) 8.5 - 10.1 MG/DL   CARBAMAZEPINE    Collection Time: 07/18/18  5:20 AM   Result Value Ref Range    Carbamazepine <0.5 (L) 4.0 - 12.0 ug/mL   EKG, 12 LEAD, SUBSEQUENT    Collection Time: 07/18/18  7:04 AM   Result Value Ref Range    Ventricular Rate 66 BPM    Atrial Rate 66 BPM    P-R Interval 240 ms    QRS Duration 78 ms    Q-T Interval 422 ms    QTC Calculation (Bezet) 442 ms    Calculated P Axis 30 degrees    Calculated R Axis -39 degrees    Calculated T Axis 42 degrees    Diagnosis       Sinus rhythm with 1st degree AV block  Left axis deviation  Abnormal ECG  When compared with ECG of 17-JUL-2018 18:26,  No significant change was found  Confirmed by Manuel Wisdom ([id number]) on 7/18/2018 8:31:22 AM     CARDIAC PANEL,(CK, CKMB & TROPONIN)    Collection Time: 07/18/18  8:58 AM   Result Value Ref Range     (H) 26 - 192 U/L    CK - MB 1.4 <3.6 ng/ml    CK-MB Index 0.6 0.0 - 4.0 %    Troponin-I, Qt. <0.02 0.0 - 0.045 NG/ML   ECHO ADULT COMPLETE    Collection Time: 07/18/18 11:30 AM   Result Value Ref Range    LVIDd 4.27 3.9 - 5.3 cm    LVPWd 1.06 (A) 0.6 - 0.9 cm    LVIDs 2.73 cm    IVSd 0.88 0.6 - 0.9 cm    LVOT d 2.07 cm    LVOT Peak Velocity 88.59 cm/s    LVOT Peak Gradient 3.1 mmHg    LVOT VTI 20.25 cm    LV E' Lateral Velocity 7.40 cm/s    MV A Osbaldo 94.94 cm/s    MV E Osbaldo 0.68 cm/s    MV E/A 0.7     LA Vol 4C 48.94 22 - 52 mL    LA Vol 2C 38.42 22 - 52 mL    LA Area 4C 19.5 cm2    LV Mass .6 67 - 162 g    LV Mass AL Index 95.4 g/m2    E/E' lateral 9.2     Mitral Valve E Wave Deceleration Time 282.7 ms    Triscuspid Valve Regurgitation Peak Gradient 17.9 mmHg    TR Max Velocity 211.62 cm/s    LA Vol Index 23.70 ml/m2    LA Vol Index 30.20 ml/m2         Intake/Output Summary (Last 24 hours) at 07/18/18 1250  Last data filed at 07/18/18 0600   Gross per 24 hour   Intake              240 ml   Output              500 ml   Net             -260 ml       Cardiographics:     ECG: SR with first degree AVB    · Echocardiogram: 7-  · Calculated left ventricular ejection fraction is 65%. Normal left ventricular wall motion, no regional wall motion abnormality noted. · Aortic valve is trileaflet. Mild aortic valve leaflet calcification present. · Mitral annular calcification. · Pulmonary arterial systolic pressure is 20 mmHg. Signed By: Keturah Majano NP supervised    July 18, 2018      I have independently evaluated and examined the patient. All relevant labs and testing data's are reviewed. Care plan discussed and updated after review.     Yeimy Loaiza MD

## 2018-07-18 NOTE — PROGRESS NOTES
Intern Progress Note [address] Patient: Ning Raman MRN: [id number]  CSN: [id number] YOB: 1933  Age: 80 y.o. Sex: female DOA: 7/17/2018 LOS:  LOS: 1 day   PCP: Rajiv Colindres MD  
             
Subjective:  
 
Acute events: no acute events. Patient told me about the presenting episode and how she was having some perioral and hand paraesthesias recently. Brief ROS: Negative for CP, SOB, abdominal pain Objective:  
  
Patient Vitals for the past 24 hrs: 
 Temp Pulse Resp BP SpO2  
07/18/18 0749 98.2 °F (36.8 °C) 65 18 146/74 97 % 07/18/18 0400 97.7 °F (36.5 °C) 68 19 152/72 99 % 07/17/18 2330 - 67 18 146/49 99 % 07/17/18 2315 - 68 21 147/58 99 % 07/17/18 2300 - 73 20 157/63 100 % 07/17/18 2245 - 67 17 136/56 99 % 07/17/18 2230 - 70 21 137/63 100 % 07/17/18 2200 - 71 17 161/57 99 % 07/17/18 2145 - 71 28 149/62 99 % 07/17/18 2130 - 71 15 158/53 99 % 07/17/18 2115 - 70 21 143/66 99 % 07/17/18 2100 - 72 13 156/75 99 % 07/17/18 2045 - 72 17 136/82 -  
07/17/18 2030 - 71 22 146/57 -  
07/17/18 2015 - 71 22 139/72 -  
07/17/18 2000 - 71 19 110/60 -  
07/17/18 1945 - 71 14 (!) 143/93 -  
07/17/18 1930 97 °F (36.1 °C) 71 16 137/53 -  
07/17/18 1915 - 71 17 141/61 -  
07/17/18 1845 - 72 14 154/66 -  
07/17/18 1831 - 76 21 - - Physical Exam:  
General: awake, alert, in NAD Cardiovascular: RRR w/o MRGs, no carotid bruits Respiratory: CTAB no rales, rhonchi, wheezes Abdomen: Soft, +BS, non-tendeder, Non-Distended Extremities: no peripheral edema Neuro: Cranial nerves grossly intact, grossly moving upper and lower extremities Skin: Negative for lesions, ulcers, rashes Lab/Data Reviewed: 
BMP:  
Lab Results Component Value Date/Time   07/18/2018 03:05 AM  
 K 3.9 07/18/2018 03:05 AM  
  (H) 07/18/2018 03:05 AM  
 CO2 23 07/18/2018 03:05 AM  
 AGAP 8 07/18/2018 03:05 AM  
 GLU 90 07/18/2018 03:05 AM  
 BUN 23 (H) 07/18/2018 03:05 AM  
 CREA 0.74 07/18/2018 03:05 AM  
 GFRAA >60 07/18/2018 03:05 AM  
 GFRNA >60 07/18/2018 03:05 AM  
 
CMP:  
Lab Results Component Value Date/Time  07/18/2018 03:05 AM  
 K 3.9 07/18/2018 03:05 AM  
  (H) 07/18/2018 03:05 AM  
 CO2 23 07/18/2018 03:05 AM  
 AGAP 8 07/18/2018 03:05 AM  
 GLU 90 07/18/2018 03:05 AM  
 BUN 23 (H) 07/18/2018 03:05 AM  
 CREA 0.74 07/18/2018 03:05 AM  
 GFRAA >60 07/18/2018 03:05 AM  
 GFRNA >60 07/18/2018 03:05 AM  
 CA 8.1 (L) 07/18/2018 03:05 AM  
 ALB 3.4 07/17/2018 06:39 PM  
 TP 6.5 07/17/2018 06:39 PM  
 GLOB 3.1 07/17/2018 06:39 PM  
 AGRAT 1.1 07/17/2018 06:39 PM  
 SGOT 24 07/17/2018 06:39 PM  
 ALT 20 07/17/2018 06:39 PM  
 
CBC:  
Lab Results Component Value Date/Time WBC 8.8 07/17/2018 06:39 PM  
 HGB 12.3 07/17/2018 06:39 PM  
 HCT 35.2 07/17/2018 06:39 PM  
  07/17/2018 06:39 PM  
  
 
CBC w/Diff Recent Labs  
   07/17/18 
 1839 WBC  8.8  
RBC  3.83* HGB  12.3 HCT  35.2 PLT  248 GRANS  68  
LYMPH  16* EOS  4 Chemistry Recent Labs  
   07/18/18 
 0305  07/17/18 
 1839 GLU  90  101* NA  143  141  
K  3.9  4.4  
CL  112*  112* CO2  23  21 BUN  23*  25* CREA  0.74  0.84 CA  8.1*  9.0 AGAP  8  8 BUCR  31*  30* AP   --   123* TP   --   6.5 ALB   --   3.4 GLOB   --   3.1 AGRAT   --   1.1 Microbiology No results for input(s): SDES, CULT in the last 72 hours. No results for input(s): CULT in the last 72 hours. I/O No intake or output data in the 24 hours ending 07/18/18 0931 Scheduled Medications Reviewed: 
Current Facility-Administered Medications Medication Dose Route Frequency  atorvastatin (LIPITOR) tablet 10 mg  10 mg Oral DAILY  carBAMazepine XR (TEGretol XR) tablet 100 mg  100 mg Oral Q12H  
 losartan (COZAAR) tablet 100 mg  100 mg Oral DAILY  montelukast (SINGULAIR) tablet 10 mg  10 mg Oral QHS Assessment/Plan  
 
80 y. o. female with PMH IBS, insomnia, SLE, chronic low back pain with sciatica, depression, HLD, HTN, allergic rhinitis, fibromyalgia now admitted with palpitations and syncope.  
   
Palpitations and syncope - patient had syncopal episode earlier today while at a cookout with her family. No seizure like activity noted EMS noted a-fib on transport no EKG available. She was apparently bradycardic with HR down to the 30. EKG in the ED showed HR 71 with first degree AV bTlock, . No electrolyte abnormalities noted on labs. Patient has no cardiac history. She does have HTN. Review of outpatient records also notes recent elevated TSH to 4.7 and subsequent biopsy of thyroid nodule which noted benign findings. DDx includes A-fib, sick sinus syndrome, heart block, ACS, medication side effect. Seizure less likely with normal neuro exam and no seizure activity. Repeat EKG today shows sinus rhythm with 1st degree AV block. Head CT shows no acute intracranial process. Carbamazepine <0.5   
- telemetry - Cardiology consult today  
- Daily BMP  
- Hold home  mg TID, confirm dose with patient - Fall, Aspiration, and seizure precautions - Heparin for DVT phrophylaxis - Cardiac diet  
- hold home acebutolol 200 mg daily with recent syncope and possible bradycardia  
   
HTN/HLD - BP in the ED stable and well controlled.  Last lipid profile in 2015 with Tchol 184, HDL 51, LDL 94, Triglycerides 197.  
- Continue home losartan 100 mg every day 
- continue home atorvastatin 10 mg daily  
   
SLE with history of lupus nephritis - noted on problem list, kidney function stable with creatinine at baseline 0.7-0.9.  
- Monitor kidney function with daily BMP  
   
Chronic pain/osteoarthritis/fibromyalgia/trigeminal neuralgia 
- Continue home carbamazepine  XR Q12 hrs 
   
Anxiety/Depression - No SSRIs in med list 
- hold home lorazepam 0.5 mg daily, as patient \"hardly ever\" takes this medication 
   
Allergies - seasonal  
- Continue home nasalcrom spray TID  
- Continue home singulair 10 mg daily Diet: Cardiac DVT Prophylaxis: Heparin Code Status: DNR Point of Contact: Cezar Andersen (Relationship: Daughter: [phone]) Maximiliano Mauricio MD PGY-1 
7/18/2018, 9:31 AM

## 2018-07-18 NOTE — MED STUDENT NOTES
*ATTENTION:  This note has been created by a medical student for educational purposes only. Please do not refer to the content of this note for clinical decision-making, billing, or other purposes. Please see attending physicians note to obtain clinical information on this patient. * History and Physical - [address] Patient: Anthony Trujillo MRN: [id number]  SSN: [id number] YOB: 1933  Age: 80 y.o. Sex: female Subjective: Anthony Trujillo is a 80 y.o. WHITE OR  female with PMH of insomnia, SLE, IBS, chronic low back pain, sciatica,depression, HLD, HTN, allergic rhinitis, trigeminal neuralgia, HSV, and fibromyalgia who presents with a chief complaint of a syncopal event. Patient was brought to ED after losing consciousness for 5 seconds at a cookout. The patient was sitting down when she began to feel \"like something bad was going to happen. \" Her daughter noted at this time that the patient was pale, her eyes rolled back into her head, and she began to fall out of her chair. Her daughter caught her before she fell to the ground. The daughter states that the patient lost consciousness for approximately 5 seconds and awoke slightly confused but was oriented within 10 seconds. The patient reported SOB, CP, dizziness and nausea on the way to the ER. Patient denies any speech slurring, seizures, tongue biting, ringing in her ears, fevers/chills, and bowel/bladder incontinence. The patient describes in the last 2 weeks she has noticed SOB on exertion (walking up stairs), and she has felt a fluttering in her chest. Last Thursday the patient awoke at 4am because of a bad headache with associated visual changes and numbness beginning in her left middle finger that spread to her index finger and thumb. She also experienced numbness in her upper lip/philtrum at this time. Patient made a future appointment with PCP to address these issues On transport to ED, EMS reported her BP was 202/90 and that she was in afib with a rate between 30-80. On route to ED, Mrs. Carleen Faye states she was nauseous. In ED, EKG showed sinus rhythm with a 1st degree block. Review of Systems - General ROS: negative for  - chills, fever, night sweats, unintentional weight changes Psychological ROS: negative for - anxiety and depression Ophthalmic ROS:  Positive for visual changes ENT ROS: Positive for headaches, visual changes negative for - hearing change Respiratory ROS: Positive for SOB negative for - cough, hemoptysis Cardiovascular ROS: Positive for dyspnea on exertion, loss of consciousness, and palpitations negative for - chest pain Gastrointestinal ROS:  Positive for nausea,  negative for - abdominal pain, change in stools, constipation, diarrhea Musculoskeletal ROS: negative for - joint pain, joint swelling or muscle pain Neurological ROS: Positive for headaches, numbness in left fingers/upper lip and dizziness, negative for - tingling or weakness Dermatological ROS: negative for - rash or skin lesion changes Past Medical History:  
Diagnosis Date  Hypertension History reviewed. No pertinent surgical history. History reviewed. No pertinent family history. Prior to Admission medications Medication Sig Start Date End Date Taking? Authorizing Provider  
aspirin (ASPIRIN) 325 mg tablet Take 975 mg by mouth three (3) times daily. Yes Historical Provider  
carBAMazepine ER (CARBATROL ER) 100 mg capsule Take 100 mg by mouth two (2) times a day. Yes Historical Provider LORazepam (ATIVAN) 0.5 mg tablet Take  by mouth every eight (8) hours as needed for Anxiety. Yes Historical Provider  
montelukast (SINGULAIR) 10 mg tablet Take 10 mg by mouth as needed. Yes Historical Provider  
losartan (COZAAR) 100 mg tablet Take 100 mg by mouth daily. Yes David Morris MD  
acebutolol (SECTRAL) 200 mg capsule Take  by mouth two (2) times a day.    Yes David Morris MD  
atorvastatin (LIPITOR) 20 mg tablet Take 10 mg by mouth daily. Yes Phys Other, MD  
  
 
Allergies Allergen Reactions  Other Food Hives  Chlorhexidine Other (comments) Hives and swelling on contact  Lisinopril Cough  Other Medication Hives Most blood pressure meds Objective:  
 
Patient Vitals: 07/17/18 @1845 HR:72 
BP: 154/66 Respirations: 14 Temperature: 97F (@5671) Physical Exam: 
GENERAL: Alert, cooperative, no distress, appears stated age LUNG: Clear to auscultation bilaterally HEART: Regular rate and rhythm, S1, S2 normal, no murmur, click, rub or gallop ABDOMEN: Soft, non-tender. Bowel sounds normal. No masses,  no organomegaly SKIN: Normal. No rashes, lesions, or ulcers NEUROLOGIC: AOx3. Cranial nerves 2-12 and sensation grossly intact. EXT: +2 radial and DP pulses b/l Labs Reviewed: 
Cardiac Panel: 7/17/18 Component Value Flag Ref Range Units Status  (H) 26 - 192 U/L Final  
CK - MB 1.9  <3.6 ng/ml Final  
CK-MB Index 0.6  0.0 - 4.0 % Final  
Troponin-I, Qt. <0.02  0.0 - 0.045 NG/ML Final  
 
Metabolic Panel: 0/46/06 Component Value Flag Ref Range Units Status Sodium 141  136 - 145 mmol/L Final  
Potassium 4.4  3.5 - 5.5 mmol/L Final  
Chloride 112 (H) 100 - 108 mmol/L Final  
CO2 21  21 - 32 mmol/L Final  
Anion gap 8  3.0 - 18 mmol/L Final  
Glucose 101 (H) 74 - 99 mg/dL Final  
BUN 25 (H) 7.0 - 18 MG/DL Final  
Creatinine 0.84  0.6 - 1.3 MG/DL Final  
BUN/Creatinine ratio 30 (H) 12 - 20   Final  
GFR est AA >60  >60 ml/min/1.73m2 Final  
GFR est non-AA >60  >60 ml/min/1.73m2 Final  
 
CBC with Automated Differential: 7/17/18 Component Value Flag Ref Range Units Status WBC 8.8  4.6 - 13.2 K/uL Final  
RBC 3.83 (L) 4.20 - 5.30 M/uL Final  
HGB 12.3  12.0 - 16.0 g/dL Final  
HCT 35.2  35.0 - 45.0 % Final  
MCV 91.9  74.0 - 97.0 FL Final  
MCH 32.1  24.0 - 34.0 PG Final  
MCHC 34.9  31.0 - 37.0 g/dL Final  
RDW 12.2  11.6 - 14.5 % Final PLATELET 583  305 - 378 K/uL Final  
MPV 9.0 (L) 9.2 - 11.8 FL Final  
NEUTROPHILS 68  40 - 73 % Final  
LYMPHOCYTES 16 (L) 21 - 52 % Final  
MONOCYTES 11 (H) 3 - 10 % Final  
EOSINOPHILS 4  0 - 5 % Final  
BASOPHILS 1  0 - 2 % Final  
ABS. NEUTROPHILS 6.1  1.8 - 8.0 K/UL Final  
ABS. LYMPHOCYTES 1.4  0.9 - 3.6 K/UL Final  
ABS. MONOCYTES 1.0  0.05 - 1.2 K/UL Final  
ABS. EOSINOPHILS 0.3  0.0 - 0.4 K/UL Final  
ABS. BASOPHILS 0.0  0.0 - 0.06 K/UL Final  
DF AUTOMATED     Final  
 
 
 
Radiology Studies: A Chest X-Ray was ordered EKG: Normal EKG, normal sinus rhythm, unchanged from previous tracings Assessment:  
 
Mrs. Mckenna Ontiveros is an 80year old female with significant PMH of SLE, HLD, HTN, trigeminal neuralgia, fibromyalgia, and depression/anxiety who presented to the ED with CC of syncope/palpitations. Differentials include an MI, arrhythmia, or medication side effects. Plan: 1. Syncope/Palpitations:  Patient was brought to ED after losing consciousness for 5 seconds at a cookout. On transport to ED, EMS reported her BP was 202/90 and that she was in afib with a rate between 30-80. In ED, EKG showed sinus rhythm with a 1st degree block. The patient reported SOB, CP, dizziness, headache, visual changes, numbness/tingling, and nausea. -Admit to telemetry 
-Consult cardiology 
-Cardiac panel 
-Echocardiogram  
-EKG 
-BMP 
-Thyroid panel 
-Head CT 
-Carbamazepine level  
-Withhold acebutolol 2. HTN/HLD: 
-Continue on atorvastatin 20mg daily 
-Continue on losartan 100mg daily 
-Monitor pressure 3. SLE with nephritis: 
-Monitor with BMP 4. Trigeminal neuralgia 
-Continue on carbamazepine ER 100mg twice daily 5. Depression/Anxiety 
-Hold lorazepam 0.5mg, patient is not taking at home Code Status: DNR Signed By: Analisa Elizabeth  MS3  
 7/18/2018

## 2018-07-18 NOTE — PROGRESS NOTES
conducted an initial consultation and Spiritual Assessment for Norman Sheth, who is a 80 y.o.,female. Patients Primary Language is: [place]. According to the patients EMR Sabianism Affiliation is: No Scientology. The reason the Patient came to the hospital is:   Patient Active Problem List    Diagnosis Date Noted    HTN (hypertension) 07/18/2018    Trigeminal neuralgia 07/18/2018    SLE (systemic lupus erythematosus) ([facility] [address] 07/18/2018    Syncope 07/17/2018        The  provided the following Interventions:  Initiated a relationship of care and support. Explored issues of ronak, spirituality and/or Jew needs while hospitalized. Listened empathically. Provided chaplaincy education. Provided information about Spiritual Care Services. Offered prayer and assurance of continued prayers on patient's behalf. Chart reviewed. The following outcomes were achieved:  Patient shared some information about their medical narrative and spiritual journey/beliefs. Patient processed feeling about current hospitalization. Patient expressed gratitude for the 's visit. Assessment:  Patient did not indicate any spiritual or Jew issues which require Spiritual Care Services interventions at this time. Patient does not have any Jew/cultural needs that will affect patients preferences in health care. Plan:  Chaplains will continue to follow and will provide pastoral care on an as needed or requested basis.  recommends bedside caregivers page  on duty if patient shows signs of acute spiritual or emotional distress.   Santa Judge, [address] [facility]  Spiritual Care  [phone]

## 2018-07-18 NOTE — PROGRESS NOTES
Discharge instructions given verbally and written all questions IV, tele, and armband discontinued awaiting in house transportation to transport to [facility]

## 2018-07-18 NOTE — DISCHARGE INSTRUCTIONS
Patient armband removed and shredded  MyChart Activation    Thank you for requesting access to [employer]. Please follow the instructions below to securely access and download your online medical record. [employer] allows you to send messages to your doctor, view your test results, renew your prescriptions, schedule appointments, and more. How Do I Sign Up? 1. In your internet browser, go to [email or web address]  2. Click on the First Time User? Click Here link in the Sign In box. You will be redirect to the New Member Sign Up page. 3. Enter your [employer] Access Code exactly as it appears below. You will not need to use this code after youve completed the sign-up process. If you do not sign up before the expiration date, you must request a new code. [employer] Access Code: [email or web address]  Expires: 2018 10:47 AM (This is the date your [employer] access code will )    4. Enter the last four digits of your Social Security Number (xxxx) and Date of Birth (mm/dd/yyyy) as indicated and click Submit. You will be taken to the next sign-up page. 5. Create a [employer] ID. This will be your [employer] login ID and cannot be changed, so think of one that is secure and easy to remember. 6. Create a [employer] password. You can change your password at any time. 7. Enter your Password Reset Question and Answer. This can be used at a later time if you forget your password. 8. Enter your e-mail address. You will receive e-mail notification when new information is available in [address]. 9. Click Sign Up. You can now view and download portions of your medical record. 10. Click the Download Summary menu link to download a portable copy of your medical information. Additional Information    If you have questions, please visit the Frequently Asked Questions section of the [employer] website at [email or web address] Remember, [employer] is NOT to be used for urgent needs.  For medical emergencies, dial 69 Emma Tran from Nurse    PATIENT INSTRUCTIONS:    After general anesthesia or intravenous sedation, for 24 hours or while taking prescription Narcotics:  · Limit your activities  · Do not drive and operate hazardous machinery  · Do not make important personal or business decisions  · Do  not drink alcoholic beverages  · If you have not urinated within 8 hours after discharge, please contact your surgeon on call. Report the following to your surgeon:  · Excessive pain, swelling, redness or odor of or around the surgical area  · Temperature over 100.5  · Nausea and vomiting lasting longer than 4 hours or if unable to take medications  · Any signs of decreased circulation or nerve impairment to extremity: change in color, persistent  numbness, tingling, coldness or increase pain  · Any questions    What to do at Home:  Recommended activity: Activity as tolerated,     If you experience any of the following symptoms chest pains, shortness of breath, fever, chills, elevated temperature greater than 100.9 F,fainting, loss of consciousness, altered mental statusplease follow up with nearest Emergency room. *  Please give a list of your current medications to your Primary Care Provider. *  Please update this list whenever your medications are discontinued, doses are      changed, or new medications (including over-the-counter products) are added. *  Please carry medication information at all times in case of emergency situations. These are general instructions for a healthy lifestyle:    No smoking/ No tobacco products/ Avoid exposure to second hand smoke  Surgeon General's Warning:  Quitting smoking now greatly reduces serious risk to your health.     Obesity, smoking, and sedentary lifestyle greatly increases your risk for illness    A healthy diet, regular physical exercise & weight monitoring are important for maintaining a healthy lifestyle    You may be retaining fluid if you have a history of heart failure or if you experience any of the following symptoms:  Weight gain of 3 pounds or more overnight or 5 pounds in a week, increased swelling in our hands or feet or shortness of breath while lying flat in bed. Please call your doctor as soon as you notice any of these symptoms; do not wait until your next office visit. Recognize signs and symptoms of STROKE:    F-face looks uneven    A-arms unable to move or move unevenly    S-speech slurred or non-existent    T-time-call 911 as soon as signs and symptoms begin-DO NOT go       Back to bed or wait to see if you get better-TIME IS BRAIN. Warning Signs of HEART ATTACK     Call 911 if you have these symptoms:   Chest discomfort. Most heart attacks involve discomfort in the center of the chest that lasts more than a few minutes, or that goes away and comes back. It can feel like uncomfortable pressure, squeezing, fullness, or pain.  Discomfort in other areas of the upper body. Symptoms can include pain or discomfort in one or both arms, the back, neck, jaw, or stomach.  Shortness of breath with or without chest discomfort.  Other signs may include breaking out in a cold sweat, nausea, or lightheadedness. Don't wait more than five minutes to call 911 - MINUTES MATTER! Fast action can save your life. Calling 911 is almost always the fastest way to get lifesaving treatment. Emergency Medical Services staff can begin treatment when they arrive -- up to an hour sooner than if someone gets to the hospital by car. The discharge information has been reviewed with the patient. The patient verbalized understanding. Discharge medications reviewed with the patient and appropriate educational materials and side effects teaching were provided.   ___________________________________________________________________________________________________________________________________

## 2018-07-18 NOTE — DISCHARGE SUMMARY
Discharge Summary Rahat Villalobos Patient: Madyson Shields Age: 80 y.o. Sex: female  : 1933 MRN: [id number] DOA: 2018 Discharge Date: 18 Attending:No att. providers found PCP: Mary Anne Yepez MD     
 
================================================================ Reason for Admission: Syncope Syncope Discharge Diagnoses:  
Syncope (resolved) Hypertension (stable) Hyperlipidemia SLE with Lupus Nephritis (stable) Anxiety/Depression (stable) Important notes to PCP/ follow-up studies and evaluations  
- Dr. Joyce Hicks evaluated patient, will follow up in 2 weeks  
- holding acebutolol in setting of symptomatic bradycardia  
- will need event monitor and NST as outpatient  
- started on norvasc for HTN  
- patient reports taking aspirin 975 mg TID, and taking carbamazepine and lorazepam inconsistently - Chads2 Vasc 4 Pending labs and studies: 
None Operative Procedures:  
None Discharge Medications:    
Current Discharge Medication List  
  
CONTINUE these medications which have NOT CHANGED Details  
aspirin (ASPIRIN) 325 mg tablet Take 975 mg by mouth three (3) times daily. LORazepam (ATIVAN) 0.5 mg tablet Take  by mouth every eight (8) hours as needed for Anxiety. montelukast (SINGULAIR) 10 mg tablet Take 10 mg by mouth as needed. losartan (COZAAR) 100 mg tablet Take 100 mg by mouth daily. acebutolol (SECTRAL) 200 mg capsule Take  by mouth two (2) times a day. atorvastatin (LIPITOR) 20 mg tablet Take 10 mg by mouth daily. carBAMazepine ER (CARBATROL ER) 100 mg capsule Take 100 mg by mouth two (2) times a day. Disposition: Home Consultants:   
Cardiology Brief Hospital Course (including pertinent history and physical findings) 80 y. o. female with PMH IBS, insomnia, SLE, chronic low back pain with sciatica, depression, HLD, HTN, allergic rhinitis, fibromyalgia now admitted with palpitations and syncope. Syncope Patient was brought in by EMS after passing out for 5 seconds while sitting outside on her porch. She felt a sensation that she was going to faint and witnesses noted pallor and eyes rolling to the back of her head. She denied jerking limb motion, tongue biting, incontinence of urine or stool, or head trauma. EMS reports she was in Afib with hear rate 30-80 during transport. In the ED the EKG showed sinus rhythm with 1st degree block, with increase in MN interval from prior, and with negative troponins. Her blood pressure on admission was 202/90. Physical exam was unremarkable. CT head was negative for any acute intracranial process. Cardiology evaluated the patient and recommended to continue holding her beta blocker and will need follow up as an outpatient for an event monitor and a nuclear stress test. Patient remained stable during the admission. HTN/HLD, SLE with nephritis, chronic pain, fibromyalgia, trigeminal neuralgia Patients home medications were adjusted as appropriate during her stay. Notably, her acebutolol was stopped and she was placed on amlodipine 5 mg daily. Summarized key findings and results (labs, imaging studies, ECHO, cardiac cath, endoscopies, etc): EKG 7/17/18: Sinus rhythm with 1st degree AV block Minimal voltage criteria for LVH, may be normal variant Borderline ECG When compared with ECG of 03-APR-2012 18:53, MN interval has increased CT head 7/18/18: No evidence for acute intracranial process. Moderate white matter disease, presumed chronic ischemic Echo 7/18/18: Calculated left ventricular ejection fraction is 65%. Normal left ventricular wall motion, no regional wall motion abnormality noted. Aortic valve is trileaflet. Mild aortic valve leaflet calcification present. Mitral annular calcification. Pulmonary arterial systolic pressure is 20 mmHg. CBC w/Diff Lab Results Component Value Date/Time  WBC 8.8 07/17/2018 06:39 PM  
 HGB 12.3 07/17/2018 06:39 PM  
 HCT 35.2 07/17/2018 06:39 PM  
 PLATELET 965 61/47/0806 06:39 PM  
 MCV 91.9 07/17/2018 06:39 PM  
   
 
 
Chemistry Lab Results Component Value Date/Time Sodium 143 07/18/2018 03:05 AM  
 Potassium 3.9 07/18/2018 03:05 AM  
 Chloride 112 (H) 07/18/2018 03:05 AM  
 CO2 23 07/18/2018 03:05 AM  
 Anion gap 8 07/18/2018 03:05 AM  
 Glucose 90 07/18/2018 03:05 AM  
 BUN 23 (H) 07/18/2018 03:05 AM  
 Creatinine 0.74 07/18/2018 03:05 AM  
 BUN/Creatinine ratio 31 (H) 07/18/2018 03:05 AM  
 GFR est AA >60 07/18/2018 03:05 AM  
 GFR est non-AA >60 07/18/2018 03:05 AM  
 Calcium 8.1 (L) 07/18/2018 03:05 AM  
   
 
 
Functional status and cognitive function:   
Ambulatory Status: alert, cooperative, no distress, appears stated age Diet: Cardiac Code status and advanced care plan: DNR Point of Contact: Krishan Harris: Daughter: [phone]) Patient Education:  Patient was educated on the following topics prior to discharge: Syncope Follow-up:  
Follow-up Information Follow up With Details Comments Contact Info Funmilayo Parikh MD Go on 7/24/2018 Follow up @ 1:15 p.m. [id number] [facility] [id number] 
[phone] Andrew Silva MD On 8/6/2018 Follow up @ 10:00 a.m. [address] [address] [facility] [id number] 
[phone] 
  
  
 
 
 
================================================================ 
Cris Wilson MD PGY-1 
[address] 7/18/2018, 3:21 PM

## 2018-07-18 NOTE — PROGRESS NOTES
Problem: Falls - Risk of  Goal: *Absence of Falls  Document Tammy Fall Risk and appropriate interventions in the flowsheet.    Outcome: Progressing Towards Goal  Fall Risk Interventions:

## 2018-07-18 NOTE — PHYSICIAN ADVISORY
Letter of admission status determination     Sis Clark   Age: 80 y.o. MRN: [id number]  CSN:  [id number]    Date of admission: 7/17/2018    I have reviewed this case as it involves a Medicare patient admitted as Inpatient that does not meet the medical necessity requirements to support Inpatient status. Therefore, unless medically necessary hospital care for a second midnight is anticipated, I recommend downgrade to Outpatient OBSERVATION. This may change due to the medical condition of the patient and new clinical evidence as the patients care progreses. The final decision regarding the patient's hospitalization status depends on the attending physician's judgment.       Nisa Chapman MD, OTONIEL, [address] [facility]  Physician Rodger Conroy.  [phone]    July 18, 2018   9:31 AM

## 2018-07-18 NOTE — ROUTINE PROCESS
Sarah.Rhiannon- Patient received from ER via stretcher. Telemetry applied. Fall band placed on patient. Patient instructed to call if she had to use the bathroom . Patient verbalized understanding. Collection container placed in bathroom for Intake and output  Call bell  In reach. Side rails up x 2.  0148- Transported to CT scan via wheelchair. 0215- Return from CT scan. 0300- Medications given . Patient refused to take the carbamazapine. Patient reports that she does not take this medication. 0600- Patient slept most of shift. Patient denies pain. No syncopal episodes this shift.

## 2018-07-18 NOTE — ROUTINE PROCESS
TRANSFER - OUT REPORT:    Verbal report given to Claudio Arrington on Maynard Filter  being transferred to [employer] (unit) for routine progression of care       Report consisted of patients Situation, Background, Assessment and   Recommendations(SBAR). Information from the following report(s) SBAR and ED Summary was reviewed with the receiving nurse. Lines:       Opportunity for questions and clarification was provided.       Patient transported with:   Monitor  Registered Nurse

## 2018-08-07 ENCOUNTER — OFFICE VISIT (OUTPATIENT)
Dept: CARDIOLOGY CLINIC | Age: 83
End: 2018-08-07

## 2018-08-07 VITALS
SYSTOLIC BLOOD PRESSURE: 116 MMHG | BODY MASS INDEX: 33.37 KG/M2 | HEART RATE: 87 BPM | HEIGHT: 58 IN | WEIGHT: 159 LBS | DIASTOLIC BLOOD PRESSURE: 70 MMHG | OXYGEN SATURATION: 97 %

## 2018-08-07 DIAGNOSIS — R00.1 BRADYCARDIA: ICD-10-CM

## 2018-08-07 DIAGNOSIS — R07.9 CHEST PAIN, UNSPECIFIED TYPE: ICD-10-CM

## 2018-08-07 DIAGNOSIS — R55 SYNCOPE, UNSPECIFIED SYNCOPE TYPE: Primary | ICD-10-CM

## 2018-08-07 DIAGNOSIS — E78.5 DYSLIPIDEMIA: ICD-10-CM

## 2018-08-07 DIAGNOSIS — I10 ESSENTIAL HYPERTENSION: ICD-10-CM

## 2018-08-07 DIAGNOSIS — R06.09 DOE (DYSPNEA ON EXERTION): ICD-10-CM

## 2018-08-07 NOTE — PROGRESS NOTES
HISTORY OF PRESENT ILLNESS  Paola Simeon is a 80 y.o. female. HPI    Patient presents for a post hospital follow-up office visit. She has a past medical history significant for essential hypertension, dyslipidemia, and SLE. Patient was hospitalized at [facility] in July 2018 following a syncopal episode which occurred at home while she was seated. Apparently, the paramedics found that she was intermittently bradycardic with an irregular heart rhythm, so her beta-blocker when she had been taking for 30-40 years for blood pressure was discontinued during her hospital stay. She did not have any significant arrhythmias on the telemetry monitor during her brief hospital stay. She underwent an echocardiogram which was essentially normal.  She was evaluated by another cardiologist, Dr. Laura Jj who recommended an outpatient event monitor upon discharge. The patient now reports that she was experiencing increased dyspnea on exertion prior to her hospitalization. She also described a tight bandlike sensation across her entire chest while exerting herself. The symptoms were relatively new in onset over the past 4-6 weeks. Following hospital discharge, she came down with the flu and as result has been not very active for the past 2 weeks, though she is finally starting to feel much better. She did replete a course of Tamiflu. He has not experienced any recurrent syncopal or near syncopal episodes. No heart palpitations, dizziness, leg swelling, orthopnea or PND. Past Medical History:   Diagnosis Date    Dyslipidemia     H/O echocardiogram 07/2018    EF 65%, thickening of the mitral valve leaflets with annular calcification, no regurgitation or stenosis    Hypertension     Syncope 07/2018     Current Outpatient Prescriptions   Medication Sig Dispense Refill    aspirin (ASPIRIN) 325 mg tablet Take 975 mg by mouth three (3) times daily.       carBAMazepine ER (CARBATROL ER) 100 mg capsule Take 100 mg by mouth two (2) times a day.  LORazepam (ATIVAN) 0.5 mg tablet Take  by mouth every eight (8) hours as needed for Anxiety.  montelukast (SINGULAIR) 10 mg tablet Take 10 mg by mouth as needed.  losartan (COZAAR) 100 mg tablet Take 100 mg by mouth daily.  atorvastatin (LIPITOR) 20 mg tablet Take 10 mg by mouth daily. Allergies   Allergen Reactions    Other Food Hives    Chlorhexidine Other (comments)     Hives and swelling on contact      Lisinopril Cough    Other Medication Hives     Most blood pressure meds      Social History   Substance Use Topics    Smoking status: Never Smoker    Smokeless tobacco: Never Used    Alcohol use No     History reviewed. No pertinent family history. Review of Systems   Constitutional: Negative for chills, fever and weight loss. HENT: Negative for nosebleeds. Eyes: Negative for blurred vision and double vision. Respiratory: Positive for shortness of breath. Negative for cough and wheezing. Cardiovascular: Positive for chest pain. Negative for palpitations, orthopnea, claudication, leg swelling and PND. Gastrointestinal: Negative for abdominal pain, heartburn, nausea and vomiting. Genitourinary: Negative for dysuria and hematuria. Musculoskeletal: Negative for falls and myalgias. Skin: Negative for rash. Neurological: Positive for loss of consciousness. Negative for dizziness, focal weakness and headaches. Endo/Heme/Allergies: Does not bruise/bleed easily. Psychiatric/Behavioral: Negative for substance abuse. Visit Vitals    /70    Pulse 87    Ht 4' 10\" (1.473 m)    Wt 72.1 kg (159 lb)    SpO2 97%    BMI 33.23 kg/m2       Physical Exam   Constitutional: She is oriented to person, place, and time. She appears well-developed and well-nourished. HENT:   Head: Normocephalic and atraumatic. Eyes: Conjunctivae are normal.   Neck: Neck supple. No JVD present. Carotid bruit is not present. Cardiovascular: Normal rate, regular rhythm, S1 normal, S2 normal and normal pulses. Exam reveals no gallop. No murmur heard. Pulmonary/Chest: Effort normal and breath sounds normal. She has no wheezes. She has no rales. Abdominal: Soft. Bowel sounds are normal. There is no tenderness. Musculoskeletal: She exhibits no edema, tenderness or deformity. Neurological: She is alert and oriented to person, place, and time. Skin: Skin is warm and dry. Psychiatric: She has a normal mood and affect. Her behavior is normal. Thought content normal.     EKG: Normal sinus rhythm, left axis deviation, borderline voltage criteria for LVH, poor R-wave progression, normal QTC normal, no ST or T-wave abnormalities concerning for ischemia. Compared to the previous EKG, the MA interval has decreased. ASSESSMENT and PLAN  Encounter Diagnoses   Name Primary?  Syncope, unspecified syncope type Yes    Essential hypertension     HAJI (dyspnea on exertion)     Chest pain, unspecified type     Dyslipidemia     Bradycardia      Syncope. Single episode in July 2018. No recurrence. Unclear etiology at this point. The patient did have intermittent bradycardia according to the paramedics when she was first picked up, but she had been taking a beta-blocker that time. Her heart rate is now normal off her beta-blocker. Her echocardiogram done during her hospitalization showed preserved LV systolic function. I have recommended a 30 day event monitor for further evaluation. Also like to evaluate for any ischemic heart disease with a pharmacologic nuclear stress test.    Exertional dyspnea and chest tightness. This is concerning for angina, so I have recommended a pharmacologic nuclear stress for further risk stratification. Patient remains on aspirin and a statin, both which I would continue. Essential hypertension. Patient blood pressure now appears reasonably well-controlled on losartan as monotherapy.   She was also taking a beta-blocker up until her hospital admission last month. Dyslipidemia. This has been managed with atorvastatin 10 mg daily (followed by her PCP. Follow-up in 1-2 months, sooner if needed.

## 2018-08-07 NOTE — MR AVS SNAPSHOT
[address] [address] [address] [address] [phone] Patient: Jerardo Figueroa MRN: [id number] PLJ:[birth year] Visit Information Date & Time Provider Department Dept. Phone Encounter #  
 8/7/2018 11:40 AM Hawa Davis MD [facility] [address] [id number] Upcoming Health Maintenance Date Due DTaP/Tdap/Td series (1 - Tdap) 4/25/1954 ZOSTER VACCINE AGE 60> 2/25/1993 GLAUCOMA SCREENING Q2Y 4/25/1998 Bone Densitometry (Dexa) Screening 4/25/1998 Pneumococcal 65+ Low/Medium Risk (1 of 2 - PCV13) 4/25/1998 MEDICARE YEARLY EXAM 3/20/2018 Influenza Age 5 to Adult 8/1/2018 Allergies as of 8/7/2018  Review Complete On: 7/18/2018 By: Anali Bro Severity Noted Reaction Type Reactions Other Food  06/20/2018    Hives Chlorhexidine  06/20/2018    Other (comments) Hives and swelling on contact Lisinopril  06/20/2018    Cough Other Medication  06/20/2018    Hives Most blood pressure meds Current Immunizations  Never Reviewed No immunizations on file. Not reviewed this visit You Were Diagnosed With   
  
 Codes Comments Syncope, unspecified syncope type    -  Primary ICD-10-CM: R55 
ICD-9-CM: 780.2 Essential hypertension     ICD-10-CM: I10 
ICD-9-CM: 401.9 HAJI (dyspnea on exertion)     ICD-10-CM: R06.09 
ICD-9-CM: 786.09 Vitals BP Pulse Height(growth percentile) Weight(growth percentile) SpO2 BMI  
 116/70 87 4' 10\" (1.473 m) 159 lb (72.1 kg) 97% 33.23 kg/m2 Smoking Status Never Smoker Vitals History BMI and BSA Data Body Mass Index Body Surface Area  
 33.23 kg/m 2 1.72 m 2 Your Updated Medication List  
  
   
This list is accurate as of 8/7/18 12:40 PM.  Always use your most recent med list.  
  
  
  
  
 aspirin 325 mg tablet Commonly known as:  ASPIRIN  
 Take 975 mg by mouth three (3) times daily. carBAMazepine  mg capsule Commonly known as:  CARBATROL ER Take 100 mg by mouth two (2) times a day. LIPITOR 20 mg tablet Generic drug:  atorvastatin Take 10 mg by mouth daily. LORazepam 0.5 mg tablet Commonly known as:  ATIVAN Take  by mouth every eight (8) hours as needed for Anxiety. losartan 100 mg tablet Commonly known as:  COZAAR Take 100 mg by mouth daily. montelukast 10 mg tablet Commonly known as:  SINGULAIR Take 10 mg by mouth as needed. We Performed the Following AMB POC EKG ROUTINE W/ 12 LEADS, INTER & REP [73466 CPT(R)] To-Do List   
 08/07/2018 ECG:  ECG EVENT RECORD MONITORING SET-UP   
  
 08/07/2018 NM Stress:  NUCLEAR CARDIAC STRESS TEST Introducing [facility]! [facility] introduces [employer] patient portal. Now you can access parts of your medical record, email your doctor's office, and request medication refills online. 1. In your internet browser, go to [email or web address] 2. Click on the First Time User? Click Here link in the Sign In box. You will see the New Member Sign Up page. 3. Enter your [employer] Access Code exactly as it appears below. You will not need to use this code after youve completed the sign-up process. If you do not sign up before the expiration date, you must request a new code. · [employer] Access Code: [email or web address] Expires: 9/18/2018 10:47 AM 
 
4. Enter the last four digits of your Social Security Number (xxxx) and Date of Birth (mm/dd/yyyy) as indicated and click Submit. You will be taken to the next sign-up page. 5. Create a Tem[employer] Mind ID. This will be your [employer] Mind login ID and cannot be changed, so think of one that is secure and easy to remember. 6. Create a [employer] password. You can change your password at any time. 7. Enter your Password Reset Question and Answer.  This can be used at a later time if you forget your password. 8. Enter your e-mail address. You will receive e-mail notification when new information is available in [address] 9. Click Sign Up. You can now view and download portions of your medical record. 10. Click the Download Summary menu link to download a portable copy of your medical information. If you have questions, please visit the Frequently Asked Questions section of the [employer] website. Remember, [employer] is NOT to be used for urgent needs. For medical emergencies, dial 911. Now available from your iPhone and Android! Please provide this summary of care documentation to your next provider. Your primary care clinician is listed as ALEXIS MORTON. If you have any questions after today's visit, please call [phone].

## 2018-08-07 NOTE — PROGRESS NOTES
1. Have you been to the ER, urgent care clinic since your last visit? Hospitalized since your last visit? Yes, 7/17 - 7/18 for syncope     2. Have you seen or consulted any other health care providers outside of the [address] [place] [address] since your last visit? Include any pap smears or colon screening.  No

## 2018-08-14 ENCOUNTER — HOSPITAL ENCOUNTER (OUTPATIENT)
Dept: NON INVASIVE DIAGNOSTICS | Age: 83
Discharge: HOME | End: 2018-08-14
Attending: INTERNAL MEDICINE
Payer: MEDICARE

## 2018-08-14 VITALS
SYSTOLIC BLOOD PRESSURE: 120 MMHG | BODY MASS INDEX: 33.37 KG/M2 | HEIGHT: 58 IN | DIASTOLIC BLOOD PRESSURE: 60 MMHG | WEIGHT: 159 LBS

## 2018-08-14 DIAGNOSIS — R06.09 DOE (DYSPNEA ON EXERTION): ICD-10-CM

## 2018-08-14 DIAGNOSIS — R55 SYNCOPE, UNSPECIFIED SYNCOPE TYPE: ICD-10-CM

## 2018-08-14 LAB
NUC REST EJECTION FRACTION: 83 %
STRESS BASELINE DIAS BP: 60 MMHG
STRESS BASELINE HR: 91 BPM
STRESS BASELINE SYS BP: 120 MMHG
STRESS ESTIMATED WORKLOAD: 1 METS
STRESS EXERCISE DUR MIN: NORMAL
STRESS PEAK DIAS BP: 60 MMHG
STRESS PEAK SYS BP: 120 MMHG
STRESS PERCENT HR ACHIEVED: 97 %
STRESS POST PEAK HR: 111 BPM
STRESS RATE PRESSURE PRODUCT: NORMAL BPM*MMHG
STRESS ST DEPRESSION: 0 MM
STRESS ST ELEVATION: 0 MM
STRESS TARGET HR: 115 BPM
TID: 1.17

## 2018-08-14 PROCEDURE — 74011000258 HC RX REV CODE- 258: Performed by: INTERNAL MEDICINE

## 2018-08-14 PROCEDURE — 93017 CV STRESS TEST TRACING ONLY: CPT

## 2018-08-14 PROCEDURE — 74011250636 HC RX REV CODE- 250/636: Performed by: INTERNAL MEDICINE

## 2018-08-14 RX ORDER — SODIUM CHLORIDE 9 MG/ML
100 INJECTION, SOLUTION INTRAVENOUS ONCE
Status: COMPLETED | OUTPATIENT
Start: 2018-08-14 | End: 2018-08-14

## 2018-08-14 RX ADMIN — SODIUM CHLORIDE 100 ML/HR: 900 INJECTION, SOLUTION INTRAVENOUS at 09:30

## 2018-08-14 RX ADMIN — REGADENOSON 0.4 MG: 0.08 INJECTION, SOLUTION INTRAVENOUS at 09:30

## 2018-08-15 NOTE — PROGRESS NOTES
Per your last note\" Syncope. Single episode in July 2018. No recurrence. Unclear etiology at this point. The patient did have intermittent bradycardia according to the paramedics when she was first picked up, but she had been taking a beta-blocker that time. Her heart rate is now normal off her beta-blocker. Her echocardiogram done during her hospitalization showed preserved LV systolic function. I have recommended a 30 day event monitor for further evaluation.   Also like to evaluate for any ischemic heart disease with a pharmacologic nuclear stress test.

## 2018-08-16 ENCOUNTER — TELEPHONE (OUTPATIENT)
Dept: CARDIOLOGY CLINIC | Age: 83
End: 2018-08-16

## 2018-08-16 NOTE — TELEPHONE ENCOUNTER
----- Message from Ev Villanueva MD sent at 8/15/2018  4:28 PM EDT -----  Please let the patient know that her nuclear stress test was normal.  ----- Message -----     From: Nimco Lewis LPN     Sent: 7/37/6974  10:13 AM       To: Ev Villanueva MD    Per your last note\" Syncope. Single episode in July 2018. No recurrence. Unclear etiology at this point. The patient did have intermittent bradycardia according to the paramedics when she was first picked up, but she had been taking a beta-blocker that time. Her heart rate is now normal off her beta-blocker. Her echocardiogram done during her hospitalization showed preserved LV systolic function. I have recommended a 30 day event monitor for further evaluation.   Also like to evaluate for any ischemic heart disease with a pharmacologic nuclear stress test.

## 2018-10-09 ENCOUNTER — OFFICE VISIT (OUTPATIENT)
Dept: CARDIOLOGY CLINIC | Age: 83
End: 2018-10-09

## 2018-10-09 VITALS
OXYGEN SATURATION: 97 % | HEIGHT: 58 IN | HEART RATE: 88 BPM | WEIGHT: 161 LBS | SYSTOLIC BLOOD PRESSURE: 122 MMHG | BODY MASS INDEX: 33.8 KG/M2 | DIASTOLIC BLOOD PRESSURE: 78 MMHG

## 2018-10-09 DIAGNOSIS — I10 ESSENTIAL HYPERTENSION: ICD-10-CM

## 2018-10-09 DIAGNOSIS — E78.5 DYSLIPIDEMIA: ICD-10-CM

## 2018-10-09 DIAGNOSIS — R00.1 BRADYCARDIA: ICD-10-CM

## 2018-10-09 DIAGNOSIS — R55 SYNCOPE, UNSPECIFIED SYNCOPE TYPE: Primary | ICD-10-CM

## 2018-10-09 RX ORDER — AMLODIPINE BESYLATE 5 MG/1
TABLET ORAL
Refills: 3 | COMMUNITY
Start: 2018-09-28

## 2018-10-09 NOTE — PROGRESS NOTES
Mandy Moura presents today for follow-up after wearing a 30-day event monitor from mid August to early September 2018. The 30-day event monitor showed no episodes of bradycardia and her average heart rate was around 84 bpm with a minimum heart beat recorded of 63 bpm.  It also did not show any atrial fibrillation. She was seen by Dr. Colin Barros on August 7, 2018. She had a syncopal episode in mid July 2018 which occurred at home. Apparently, paramedics found that she was intermittently bradycardic with an irregular heart rhythm and during her hospital stay, her beta-blocker was discontinued. She states that she was on this beta-blocker for 30-40 years as she was allergic to many of the medications that were used for hypertension when she was first diagnosed. She did not have any significant arrhythmias on telemetry during her hospital stay. She had an echocardiogram done during that hospital stay and it showed an ejection fraction of 65% with thickening of the mitral valve leaflets with annular calcification but no regurgitation or stenosis. On August 14, 2018, she underwent a pharmacologic nuclear stress test which showed no perfusion imaging abnormalities concerning for ischemia or infarct. Myocardial perfusion imaging was negative and the test was considered a low risk stress test.  Her ejection fraction was calculated at 83%. She reports that she was called with the results of the stress test.    Overall, she states that she has been feeling well. She has not had any further syncopal episodes. Denies chest pain, tightness, heaviness, and palpitations. Denies shortness of breath at rest, dyspnea on exertion, orthopnea and PND. Denies abdominal bloating. Denies lightheadedness, dizziness, and syncope. Denies lower extremity edema and claudication. Denies nausea, vomiting, diarrhea, melena, hematochezia. Denies hematuria, urgency, frequency. Denies fever, chills.       She reports that she has not been driving as her primary care physician recommended that she wait until all of her test results have returned. Denies chest pain, tightness, heaviness, and palpitations. Denies shortness of breath at rest, dyspnea on exertion, orthopnea and PND. Denies abdominal bloating. Denies lightheadedness, dizziness, and syncope. Denies lower extremity edema and claudication. Denies nausea, vomiting, diarrhea, melena, hematochezia. Denies hematuria, urgency, frequency. Denies fever, chills. PMH:  Past Medical History:   Diagnosis Date    Dyslipidemia     H/O echocardiogram 07/2018    EF 65%, thickening of the mitral valve leaflets with annular calcification, no regurgitation or stenosis    Hypertension     Syncope 07/2018       PSH:  History reviewed. No pertinent surgical history. MEDS:  Current Outpatient Prescriptions   Medication Sig    amLODIPine (NORVASC) 5 mg tablet TK 1 T PO QD    aspirin (ASPIRIN) 325 mg tablet Take 975 mg by mouth three (3) times daily.  carBAMazepine ER (CARBATROL ER) 100 mg capsule Take 100 mg by mouth two (2) times a day.  LORazepam (ATIVAN) 0.5 mg tablet Take  by mouth every eight (8) hours as needed for Anxiety.  montelukast (SINGULAIR) 10 mg tablet Take 10 mg by mouth as needed.  losartan (COZAAR) 100 mg tablet Take 100 mg by mouth daily.  atorvastatin (LIPITOR) 20 mg tablet Take 10 mg by mouth daily. No current facility-administered medications for this visit. Allergies and Sensitivities:  Allergies   Allergen Reactions    Other Food Hives    Chlorhexidine Other (comments)     Hives and swelling on contact      Lisinopril Cough    Other Medication Hives     Most blood pressure meds       Family History:  History reviewed. No pertinent family history. Social History:  She  reports that she has never smoked. She has never used smokeless tobacco.  She  reports that she does not drink alcohol.       Physical:  Visit Vitals    BP 122/78    Pulse 88    Ht 4' 10\" (1.473 m)    Wt 73 kg (161 lb)    SpO2 97%    BMI 33.65 kg/m2         Exam:  Neck:  Supple, no JVD, no carotid bruits  CV:  Normal S1 and  S2, no murmurs, rubs, or gallops noted  Lungs:  Clear to ausculation throughout, no wheezes or rales  Abd:  Soft, non-tender, non-distended with good bowel sounds. No hepatosplenomegaly  Extremities:  No edema      Data:  EKG:   Not done today      LABS:  Lab Results   Component Value Date/Time    Sodium 143 07/18/2018 03:05 AM    Potassium 3.9 07/18/2018 03:05 AM    Chloride 112 (H) 07/18/2018 03:05 AM    CO2 23 07/18/2018 03:05 AM    Glucose 90 07/18/2018 03:05 AM    BUN 23 (H) 07/18/2018 03:05 AM    Creatinine 0.74 07/18/2018 03:05 AM     No results found for: CHOL, CHOLX, CHLST, CHOLV, HDL, LDL, LDLC, DLDLP, TGLX, TRIGL, TRIGP, CHHD, CHHDX  Lab Results   Component Value Date/Time    ALT (SGPT) 20 07/17/2018 06:39 PM         Impression/Plan:  1. Syncope, single episode that occurred in mid July 2018  2. Essential hypertension, blood pressure controlled on amlodipine 5mg and losartan 100mg  3. Dyslipidemia, on atorvastatin 20mg    Mrs. Christina Knight was seen today for follow-up after wearing a 30 day event monitor. The monitor showed sinus rhythm to sinus tachycardia with a minimum heart rate of 63 bpm and average heart rate of 84 bpm.  There was not atrial fibrillation and no episodes of bradycardia. She states that she has not had any pre-syncopal or syncopal episodes. She has not been driving as her PCP recommended that she not drive until all of her cardiac testing was completed. She had an echocardiogram done during her hospital stay and it showed ejection fraction of 65%. She had a pharmacologic nuclear stress test done on August 14, 2018 which was considered negative and low risk. There were no perfusion imaging abnormalities concerning for ischemia or infarct.   The Holter monitor was worn from mid August through September 11, 2018 and findings are as noted in the paragraph above. She was aware of the findings of the stress test and echocardiogram.  I reviewed the 30-day event monitor findings with her and she confirmed that she has not had any presyncopal or syncopal episodes. Final interpretation is awaiting Dr. Alecia Aranda review of the event monitor results. Once his final interpretation is documented, she will be informed whether or not she can resume driving. She will follow-up with Dr. Alma Iglesias as scheduled and as needed. Ajnel Roblero MSN, FNP-BC    Please note:  Portions of this chart were created with Dragon medical speech to text program.  Unrecognized errors may be present.

## 2018-10-09 NOTE — MR AVS SNAPSHOT
[address] [address] [address] [id number] [phone] Patient: Gustavo Hickman MRN: [id number] EAV:[birth year] Visit Information Date & Time Provider Department Dept. Phone Encounter #  
 10/9/2018 11:00 AM Charli Cash NP [facility] [address] [id number] Your Appointments 5/3/2019 10:20 AM  
Follow Up with Morena Carter MD  
[facility] ([facility] Appt Note: 9 month f/u  
 [address] [place] [address] [address] [address] [address] [phone] [facility] [address] Upcoming Health Maintenance Date Due DTaP/Tdap/Td series (1 - Tdap) 4/25/1954 Shingrix Vaccine Age 50> (1 of 2) 4/25/1983 GLAUCOMA SCREENING Q2Y 4/25/1998 Bone Densitometry (Dexa) Screening 4/25/1998 Pneumococcal 65+ Low/Medium Risk (1 of 2 - PCV13) 4/25/1998 MEDICARE YEARLY EXAM 3/20/2018 Influenza Age 5 to Adult 8/1/2018 Allergies as of 10/9/2018  Review Complete On: 10/9/2018 By: Charli Cash NP Severity Noted Reaction Type Reactions Other Food  06/20/2018    Hives Chlorhexidine  06/20/2018    Other (comments) Hives and swelling on contact Lisinopril  06/20/2018    Cough Other Medication  06/20/2018    Hives Most blood pressure meds Current Immunizations  Never Reviewed No immunizations on file. Not reviewed this visit You Were Diagnosed With   
  
 Codes Comments Syncope, unspecified syncope type    -  Primary ICD-10-CM: R55 
ICD-9-CM: 780.2 Essential hypertension     ICD-10-CM: I10 
ICD-9-CM: 401.9 Dyslipidemia     ICD-10-CM: E78.5 ICD-9-CM: 272.4 Bradycardia     ICD-10-CM: R00.1 ICD-9-CM: 427.89 Vitals BP Pulse Height(growth percentile) Weight(growth percentile) SpO2 BMI 122/78 88 4' 10\" (1.473 m) 161 lb (73 kg) 97% 33.65 kg/m2 Smoking Status Never Smoker Vitals History BMI and BSA Data Body Mass Index Body Surface Area  
 33.65 kg/m 2 1.73 m 2 Your Updated Medication List  
  
   
This list is accurate as of 10/9/18 11:31 AM.  Always use your most recent med list. amLODIPine 5 mg tablet Commonly known as:  Tyson Pee TK 1 T PO QD  
  
 aspirin 325 mg tablet Commonly known as:  ASPIRIN Take 975 mg by mouth three (3) times daily. carBAMazepine  mg capsule Commonly known as:  CARBATROL ER Take 100 mg by mouth two (2) times a day. LIPITOR 20 mg tablet Generic drug:  atorvastatin Take 10 mg by mouth daily. LORazepam 0.5 mg tablet Commonly known as:  ATIVAN Take  by mouth every eight (8) hours as needed for Anxiety. losartan 100 mg tablet Commonly known as:  COZAAR Take 100 mg by mouth daily. montelukast 10 mg tablet Commonly known as:  SINGULAIR Take 10 mg by mouth as needed. Patient Instructions Continue present medication regimen Follow-up with Dr. Jayden Kim as scheduled and as needed Introducing [facility]! [facility] introduces [employer] patient portal. Now you can access parts of your medical record, email your doctor's office, and request medication refills online. 1. In your internet browser, go to [email or web address] 2. Click on the First Time User? Click Here link in the Sign In box. You will see the New Member Sign Up page. 3. Enter your [employer] Access Code exactly as it appears below. You will not need to use this code after youve completed the sign-up process. If you do not sign up before the expiration date, you must request a new code. · [employer] Access Code: [email or web address] Expires: 1/7/2019 10:42 AM 
 
4.  Enter the last four digits of your Social Security Number (xxxx) and Date of Birth (mm/dd/yyyy) as indicated and click Submit. You will be taken to the next sign-up page. 5. Create a Trumaker ID. This will be your [employer] login ID and cannot be changed, so think of one that is secure and easy to remember. 6. Create a Tr[employer] password. You can change your password at any time. 7. Enter your Password Reset Question and Answer. This can be used at a later time if you forget your password. 8. Enter your e-mail address. You will receive e-mail notification when new information is available in [address] 9. Click Sign Up. You can now view and download portions of your medical record. 10. Click the Download Summary menu link to download a portable copy of your medical information. If you have questions, please visit the Frequently Asked Questions section of the [employer] website. Remember, [employer] is NOT to be used for urgent needs. For medical emergencies, dial 911. Now available from your iPhone and Android! Please provide this summary of care documentation to your next provider. Your primary care clinician is listed as ALEXIS MORTON. If you have any questions after today's visit, please call [phone].

## 2018-10-12 ENCOUNTER — TELEPHONE (OUTPATIENT)
Dept: CARDIOLOGY CLINIC | Age: 83
End: 2018-10-12

## 2018-10-12 NOTE — TELEPHONE ENCOUNTER
I returned Mrs. Pam Freemans phone call. She wanted to know if she could resume driving. Per Dr. Jayden Kim, event monitor was normal.  Patient informed that she could resume driving.     Samm SHIPMAN, FNP-BC

## 2019-01-01 ENCOUNTER — OFFICE VISIT (OUTPATIENT)
Dept: CARDIOLOGY CLINIC | Age: 84
End: 2019-01-01

## 2019-01-01 VITALS
SYSTOLIC BLOOD PRESSURE: 142 MMHG | HEART RATE: 71 BPM | OXYGEN SATURATION: 97 % | BODY MASS INDEX: 32.95 KG/M2 | HEIGHT: 58 IN | DIASTOLIC BLOOD PRESSURE: 78 MMHG | WEIGHT: 157 LBS

## 2019-01-01 DIAGNOSIS — E78.5 DYSLIPIDEMIA: ICD-10-CM

## 2019-01-01 DIAGNOSIS — R55 SYNCOPE, UNSPECIFIED SYNCOPE TYPE: Primary | ICD-10-CM

## 2019-01-01 DIAGNOSIS — I10 ESSENTIAL HYPERTENSION: ICD-10-CM

## 2019-01-01 RX ORDER — VITAMIN E 268 MG
CAPSULE ORAL DAILY
COMMUNITY

## 2019-01-01 RX ORDER — ASCORBIC ACID 500 MG
TABLET ORAL
COMMUNITY

## 2019-01-01 RX ORDER — ACETAMINOPHEN 500 MG
TABLET ORAL 2 TIMES DAILY
COMMUNITY

## 2019-06-06 NOTE — PROGRESS NOTES
Dot Nim presents today for Chief Complaint Patient presents with  Shortness of Breath  
  9 month follow up Dot Nim preferred language for health care discussion is english/other. Is someone accompanying this pt? no 
 
Is the patient using any DME equipment during OV? no 
 
Depression Screening: No flowsheet data found. Learning Assessment: 
Learning Assessment 6/6/2019 PRIMARY LEARNER Patient HIGHEST LEVEL OF EDUCATION - PRIMARY LEARNER  GRADUATED HIGH SCHOOL OR GED  
BARRIERS PRIMARY LEARNER NONE  
CO-LEARNER CAREGIVER No  
CO-LEARNER NAME na PRIMARY LANGUAGE ENGLISH  
LEARNER PREFERENCE PRIMARY READING  
ANSWERED BY self RELATIONSHIP SELF Abuse Screening: 
Abuse Screening Questionnaire 6/6/2019 Do you ever feel afraid of your partner? Julianna Host Are you in a relationship with someone who physically or mentally threatens you? Julianna Host Is it safe for you to go home? Karen Camara Fall Risk No flowsheet data found. Pt currently taking Anticoagulant therapy? no 
 
Coordination of Care: 1. Have you been to the ER, urgent care clinic since your last visit? Hospitalized since your last visit? no 
 
2. Have you seen or consulted any other health care providers outside of the [facility] since your last visit? Include any pap smears or colon screening. no

## 2019-06-06 NOTE — PROGRESS NOTES
HISTORY OF PRESENT ILLNESS Dot Malu is a 80 y.o. female. Shortness of Breath Pertinent negatives include no fever, no headaches, no cough, no wheezing, no PND, no orthopnea, no chest pain, no vomiting, no abdominal pain, no rash, no leg swelling and no claudication. Patient presents for a post hospital follow-up office visit. She has a past medical history significant for essential hypertension, dyslipidemia, and SLE. [facility] following a syncopal episode which occurred at home while she was seated. Apparently, the paramedics found that she was intermittently bradycardic with an irregular heart rhythm, so her beta-blocker when she had been taking for 30-40 years for blood pressure was discontinued during her hospital stay. She did not have any significant arrhythmias on the telemetry monitor during her brief hospital stay. She underwent an echocardiogram which was essentially normal.   
 
Patient then underwent a pharmacologic nuclear stress test in August 2019 which was a normal low risk study. She then wore a 30-day event monitor from August until September 2018 which did not show any significant arrhythmias. Average heart rate 85 bpm, range . Patient was last seen in our office by our nurse practitioner in October 2018. He was doing quite well at that time and has not had a syncopal episode since her initial episode in July of last year. She feels her activity level is back to baseline. No new chest pain or pressure. She does complain of exertional dyspnea which has not changed over the past year or 2. No leg swelling, no orthopnea, no PND. Past Medical History:  
Diagnosis Date  Dyslipidemia  H/O echocardiogram 07/2018 EF 65%, thickening of the mitral valve leaflets with annular calcification, no regurgitation or stenosis  Hypertension  Syncope 07/2018 Current Outpatient Medications Medication Sig Dispense Refill  multivit-min/iron/folic/lutein (CENTRUM SILVER WOMEN PO) Take  by mouth.  fish oil-omega-3 fatty acids 300-500 mg cap Take  by mouth.  vitamin E (AQUA GEMS) 400 unit capsule Take  by mouth daily.  AZO CRANBERRY 248-04-97 mg-mg-million tab Take  by mouth.  ascorbic acid, vitamin C, (VITAMIN C) 500 mg tablet Take  by mouth.  cholecalciferol (VITAMIN D3) 2,000 unit cap capsule Take  by mouth two (2) times a day.  amLODIPine (NORVASC) 5 mg tablet TK 1 T PO QD  3  
 aspirin (ASPIRIN) 325 mg tablet Take 975 mg by mouth three (3) times daily.  carBAMazepine ER (CARBATROL ER) 100 mg capsule Take 100 mg by mouth two (2) times a day.  LORazepam (ATIVAN) 0.5 mg tablet Take  by mouth every eight (8) hours as needed for Anxiety.  montelukast (SINGULAIR) 10 mg tablet Take 10 mg by mouth as needed.  losartan (COZAAR) 100 mg tablet Take 100 mg by mouth daily.  atorvastatin (LIPITOR) 20 mg tablet Take 10 mg by mouth daily. Allergies Allergen Reactions  Other Food Hives  Chlorhexidine Other (comments) Hives and swelling on contact  Lisinopril Cough  Other Medication Hives Most blood pressure meds Social History Tobacco Use  Smoking status: Never Smoker  Smokeless tobacco: Never Used Substance Use Topics  Alcohol use: No  
 Drug use: Not on file History reviewed. No pertinent family history. Review of Systems Constitutional: Negative for chills, fever and weight loss. HENT: Negative for nosebleeds. Eyes: Negative for blurred vision and double vision. Respiratory: Positive for shortness of breath. Negative for cough and wheezing. Cardiovascular: Negative for chest pain, palpitations, orthopnea, claudication, leg swelling and PND. Gastrointestinal: Negative for abdominal pain, heartburn, nausea and vomiting. Genitourinary: Negative for dysuria and hematuria. Musculoskeletal: Negative for falls and myalgias. Skin: Negative for rash. Neurological: Negative for dizziness, focal weakness, loss of consciousness and headaches. Endo/Heme/Allergies: Does not bruise/bleed easily. Psychiatric/Behavioral: Negative for substance abuse. Visit Vitals /78 Pulse 71 Ht 4' 10\" (1.473 m) Wt 71.2 kg (157 lb) SpO2 97% BMI 32.81 kg/m² Physical Exam  
Constitutional: She is oriented to person, place, and time. She appears well-developed and well-nourished. HENT:  
Head: Normocephalic and atraumatic. Eyes: Conjunctivae are normal.  
Neck: Neck supple. No JVD present. Carotid bruit is not present. Cardiovascular: Normal rate, regular rhythm, S1 normal, S2 normal and normal pulses. Exam reveals no gallop. No murmur heard. Pulmonary/Chest: Effort normal and breath sounds normal. She has no wheezes. She has no rales. Abdominal: Soft. Bowel sounds are normal. There is no tenderness. Musculoskeletal: She exhibits no edema, tenderness or deformity. Neurological: She is alert and oriented to person, place, and time. Skin: Skin is warm and dry. Psychiatric: She has a normal mood and affect. Her behavior is normal. Thought content normal.  
 
EKG: Normal sinus rhythm, leftward axis, borderline voltage criteria for LVH, poor R-wave progression, normal QTC normal, no ST or T-wave abnormalities concerning for ischemia. Compared to the previous EKG, no significant interval change. ASSESSMENT and PLAN Syncope. Single episode in July 2018. No recurrence since that episode. Unclear etiology at this point. The patient did have intermittent bradycardia according to the paramedics when she was first picked up, but she had been taking a beta-blocker that time. Her heart rate normalized after stopping her beta-blocker. Her echocardiogram done during her hospitalization showed preserved LV systolic function.   She also underwent a pharmacologic nuclear stress test and a 30-day event monitor in August 2018, both of which were normal.  No further work-up needed. Exertional dyspnea. This is unchanged over the past year. Unlikely cardiac in nature. She underwent a relatively normal echocardiogram and a normal nuclear stress test in 2018. Essential hypertension. Patient blood pressure has been well controlled now with combination of amlodipine and losartan, both of which I would continue. Dyslipidemia. This has been managed with atorvastatin 10 mg daily. If this is followed by her PCP. Follow-up in 12 months, sooner if needed.

## 2020-01-01 ENCOUNTER — APPOINTMENT (OUTPATIENT)
Dept: GENERAL RADIOLOGY | Age: 85
DRG: 870 | End: 2020-01-01
Attending: STUDENT IN AN ORGANIZED HEALTH CARE EDUCATION/TRAINING PROGRAM
Payer: MEDICARE

## 2020-01-01 ENCOUNTER — APPOINTMENT (OUTPATIENT)
Dept: NON INVASIVE DIAGNOSTICS | Age: 85
DRG: 870 | End: 2020-01-01
Attending: PHYSICIAN ASSISTANT
Payer: MEDICARE

## 2020-01-01 ENCOUNTER — APPOINTMENT (OUTPATIENT)
Dept: GENERAL RADIOLOGY | Age: 85
DRG: 870 | End: 2020-01-01
Attending: PHYSICIAN ASSISTANT
Payer: MEDICARE

## 2020-01-01 ENCOUNTER — APPOINTMENT (OUTPATIENT)
Dept: NON INVASIVE DIAGNOSTICS | Age: 85
DRG: 870 | End: 2020-01-01
Attending: STUDENT IN AN ORGANIZED HEALTH CARE EDUCATION/TRAINING PROGRAM
Payer: MEDICARE

## 2020-01-01 ENCOUNTER — HOSPITAL ENCOUNTER (INPATIENT)
Age: 85
LOS: 12 days | DRG: 870 | End: 2020-01-28
Attending: EMERGENCY MEDICINE | Admitting: FAMILY MEDICINE
Payer: MEDICARE

## 2020-01-01 ENCOUNTER — HOSPICE ADMISSION (OUTPATIENT)
Dept: HOSPICE | Facility: HOSPICE | Age: 85
End: 2020-01-01

## 2020-01-01 ENCOUNTER — HOME CARE VISIT (OUTPATIENT)
Dept: HOSPICE | Facility: HOSPICE | Age: 85
End: 2020-01-01

## 2020-01-01 ENCOUNTER — APPOINTMENT (OUTPATIENT)
Dept: NON INVASIVE DIAGNOSTICS | Age: 85
DRG: 870 | End: 2020-01-01
Attending: INTERNAL MEDICINE
Payer: MEDICARE

## 2020-01-01 VITALS
HEART RATE: 127 BPM | HEIGHT: 58 IN | BODY MASS INDEX: 33.8 KG/M2 | OXYGEN SATURATION: 77 % | RESPIRATION RATE: 20 BRPM | SYSTOLIC BLOOD PRESSURE: 124 MMHG | WEIGHT: 161 LBS | TEMPERATURE: 97.8 F | DIASTOLIC BLOOD PRESSURE: 57 MMHG

## 2020-01-01 DIAGNOSIS — J96.01 ACUTE RESPIRATORY FAILURE WITH HYPOXIA (HCC): ICD-10-CM

## 2020-01-01 DIAGNOSIS — Z71.89 ADVANCED CARE PLANNING/COUNSELING DISCUSSION: ICD-10-CM

## 2020-01-01 DIAGNOSIS — J18.9 PNEUMONIA DUE TO INFECTIOUS ORGANISM, UNSPECIFIED LATERALITY, UNSPECIFIED PART OF LUNG: ICD-10-CM

## 2020-01-01 DIAGNOSIS — I48.91 ATRIAL FIBRILLATION, UNSPECIFIED TYPE (HCC): Primary | ICD-10-CM

## 2020-01-01 DIAGNOSIS — N17.9 ACUTE RENAL FAILURE, UNSPECIFIED ACUTE RENAL FAILURE TYPE (HCC): ICD-10-CM

## 2020-01-01 DIAGNOSIS — N30.01 ACUTE CYSTITIS WITH HEMATURIA: ICD-10-CM

## 2020-01-01 DIAGNOSIS — E87.1 HYPONATREMIA: ICD-10-CM

## 2020-01-01 LAB
ABO + RH BLD: NORMAL
ABO + RH BLD: NORMAL
ALBUMIN SERPL-MCNC: 1.9 G/DL (ref 3.4–5)
ALBUMIN SERPL-MCNC: 2.2 G/DL (ref 3.4–5)
ALBUMIN/GLOB SERPL: 0.5 {RATIO} (ref 0.8–1.7)
ALP SERPL-CCNC: 130 U/L (ref 45–117)
ALT SERPL-CCNC: 27 U/L (ref 13–56)
AMMONIA PLAS-SCNC: 22 UMOL/L (ref 11–32)
ANION GAP SERPL CALC-SCNC: 10 MMOL/L (ref 3–18)
ANION GAP SERPL CALC-SCNC: 10 MMOL/L (ref 3–18)
ANION GAP SERPL CALC-SCNC: 11 MMOL/L (ref 3–18)
ANION GAP SERPL CALC-SCNC: 12 MMOL/L (ref 3–18)
ANION GAP SERPL CALC-SCNC: 13 MMOL/L (ref 3–18)
ANION GAP SERPL CALC-SCNC: 14 MMOL/L (ref 3–18)
ANION GAP SERPL CALC-SCNC: 5 MMOL/L (ref 3–18)
ANION GAP SERPL CALC-SCNC: 5 MMOL/L (ref 3–18)
ANION GAP SERPL CALC-SCNC: 6 MMOL/L (ref 3–18)
ANION GAP SERPL CALC-SCNC: 7 MMOL/L (ref 3–18)
ANION GAP SERPL CALC-SCNC: 8 MMOL/L (ref 3–18)
ANION GAP SERPL CALC-SCNC: 8 MMOL/L (ref 3–18)
ANION GAP SERPL CALC-SCNC: 9 MMOL/L (ref 3–18)
ANION GAP SERPL CALC-SCNC: 9 MMOL/L (ref 3–18)
APPEARANCE UR: ABNORMAL
APPEARANCE UR: CLEAR
APTT PPP: 31.9 SEC (ref 23–36.4)
ARTERIAL PATENCY WRIST A: ABNORMAL
ARTERIAL PATENCY WRIST A: YES
ARTERIAL PATENCY WRIST A: YES
AST SERPL-CCNC: 33 U/L (ref 10–38)
ATRIAL RATE: 163 BPM
ATRIAL RATE: 192 BPM
ATRIAL RATE: 78 BPM
BACTERIA SPEC CULT: NORMAL
BACTERIA URNS QL MICRO: ABNORMAL /HPF
BACTERIA URNS QL MICRO: ABNORMAL /HPF
BASE DEFICIT BLD-SCNC: 3 MMOL/L
BASE DEFICIT BLD-SCNC: 5 MMOL/L
BASE EXCESS BLD CALC-SCNC: 1 MMOL/L
BASE EXCESS BLD CALC-SCNC: 10 MMOL/L
BASE EXCESS BLD CALC-SCNC: 11 MMOL/L
BASE EXCESS BLD CALC-SCNC: 13 MMOL/L
BASE EXCESS BLD CALC-SCNC: 3 MMOL/L
BASE EXCESS BLD CALC-SCNC: 6 MMOL/L
BASE EXCESS BLD CALC-SCNC: 9 MMOL/L
BASE EXCESS BLD CALC-SCNC: 9 MMOL/L
BASOPHILS # BLD: 0 K/UL (ref 0–0.06)
BASOPHILS # BLD: 0 K/UL (ref 0–0.1)
BASOPHILS NFR BLD: 0 % (ref 0–2)
BASOPHILS NFR BLD: 0 % (ref 0–3)
BDY SITE: ABNORMAL
BILIRUB SERPL-MCNC: 0.3 MG/DL (ref 0.2–1)
BILIRUB UR QL: NEGATIVE
BILIRUB UR QL: NEGATIVE
BLD PROD TYP BPU: NORMAL
BLOOD GROUP ANTIBODIES SERPL: NORMAL
BLOOD GROUP ANTIBODIES SERPL: NORMAL
BODY TEMPERATURE: 98.6
BODY TEMPERATURE: 98.6
BPU ID: NORMAL
BUN SERPL-MCNC: 14 MG/DL (ref 7–18)
BUN SERPL-MCNC: 18 MG/DL (ref 7–18)
BUN SERPL-MCNC: 18 MG/DL (ref 7–18)
BUN SERPL-MCNC: 19 MG/DL (ref 7–18)
BUN SERPL-MCNC: 19 MG/DL (ref 7–18)
BUN SERPL-MCNC: 22 MG/DL (ref 7–18)
BUN SERPL-MCNC: 23 MG/DL (ref 7–18)
BUN SERPL-MCNC: 25 MG/DL (ref 7–18)
BUN SERPL-MCNC: 42 MG/DL (ref 7–18)
BUN SERPL-MCNC: 56 MG/DL (ref 7–18)
BUN SERPL-MCNC: 63 MG/DL (ref 7–18)
BUN SERPL-MCNC: 66 MG/DL (ref 7–18)
BUN SERPL-MCNC: 74 MG/DL (ref 7–18)
BUN SERPL-MCNC: 88 MG/DL (ref 7–18)
BUN/CREAT SERPL: 14 (ref 12–20)
BUN/CREAT SERPL: 15 (ref 12–20)
BUN/CREAT SERPL: 16 (ref 12–20)
BUN/CREAT SERPL: 17 (ref 12–20)
BUN/CREAT SERPL: 20 (ref 12–20)
BUN/CREAT SERPL: 30 (ref 12–20)
BUN/CREAT SERPL: 34 (ref 12–20)
BUN/CREAT SERPL: 37 (ref 12–20)
BUN/CREAT SERPL: 37 (ref 12–20)
BUN/CREAT SERPL: 38 (ref 12–20)
BUN/CREAT SERPL: 41 (ref 12–20)
CA-I SERPL-SCNC: 1.08 MMOL/L (ref 1.12–1.32)
CALCIUM SERPL-MCNC: 7.2 MG/DL (ref 8.5–10.1)
CALCIUM SERPL-MCNC: 7.3 MG/DL (ref 8.5–10.1)
CALCIUM SERPL-MCNC: 7.4 MG/DL (ref 8.5–10.1)
CALCIUM SERPL-MCNC: 7.5 MG/DL (ref 8.5–10.1)
CALCIUM SERPL-MCNC: 7.7 MG/DL (ref 8.5–10.1)
CALCIUM SERPL-MCNC: 7.9 MG/DL (ref 8.5–10.1)
CALCIUM SERPL-MCNC: 8.1 MG/DL (ref 8.5–10.1)
CALCIUM SERPL-MCNC: 8.3 MG/DL (ref 8.5–10.1)
CALCIUM SERPL-MCNC: 8.7 MG/DL (ref 8.5–10.1)
CALCIUM SERPL-MCNC: 8.9 MG/DL (ref 8.5–10.1)
CALCIUM SERPL-MCNC: 9 MG/DL (ref 8.5–10.1)
CALCIUM SERPL-MCNC: 9 MG/DL (ref 8.5–10.1)
CALCIUM SERPL-MCNC: 9.1 MG/DL (ref 8.5–10.1)
CALCIUM SERPL-MCNC: 9.2 MG/DL (ref 8.5–10.1)
CALCULATED P AXIS, ECG09: 20 DEGREES
CALCULATED R AXIS, ECG10: -14 DEGREES
CALCULATED R AXIS, ECG10: -23 DEGREES
CALCULATED R AXIS, ECG10: 4 DEGREES
CALCULATED T AXIS, ECG11: 106 DEGREES
CALCULATED T AXIS, ECG11: 132 DEGREES
CALCULATED T AXIS, ECG11: 45 DEGREES
CALLED TO:,BCALL1: NORMAL
CHLORIDE SERPL-SCNC: 100 MMOL/L (ref 100–111)
CHLORIDE SERPL-SCNC: 101 MMOL/L (ref 100–111)
CHLORIDE SERPL-SCNC: 101 MMOL/L (ref 100–111)
CHLORIDE SERPL-SCNC: 102 MMOL/L (ref 100–111)
CHLORIDE SERPL-SCNC: 102 MMOL/L (ref 100–111)
CHLORIDE SERPL-SCNC: 104 MMOL/L (ref 100–111)
CHLORIDE SERPL-SCNC: 104 MMOL/L (ref 100–111)
CHLORIDE SERPL-SCNC: 105 MMOL/L (ref 100–111)
CHLORIDE SERPL-SCNC: 94 MMOL/L (ref 100–111)
CHLORIDE SERPL-SCNC: 97 MMOL/L (ref 100–111)
CHLORIDE SERPL-SCNC: 98 MMOL/L (ref 100–111)
CHLORIDE SERPL-SCNC: 99 MMOL/L (ref 100–111)
CK MB CFR SERPL CALC: 2.5 % (ref 0–4)
CK MB SERPL-MCNC: 1.7 NG/ML (ref 5–25)
CK SERPL-CCNC: 69 U/L (ref 26–192)
CO2 SERPL-SCNC: 21 MMOL/L (ref 21–32)
CO2 SERPL-SCNC: 22 MMOL/L (ref 21–32)
CO2 SERPL-SCNC: 23 MMOL/L (ref 21–32)
CO2 SERPL-SCNC: 23 MMOL/L (ref 21–32)
CO2 SERPL-SCNC: 24 MMOL/L (ref 21–32)
CO2 SERPL-SCNC: 27 MMOL/L (ref 21–32)
CO2 SERPL-SCNC: 29 MMOL/L (ref 21–32)
CO2 SERPL-SCNC: 31 MMOL/L (ref 21–32)
CO2 SERPL-SCNC: 33 MMOL/L (ref 21–32)
CO2 SERPL-SCNC: 34 MMOL/L (ref 21–32)
COLOR UR: YELLOW
COLOR UR: YELLOW
CREAT SERPL-MCNC: 0.46 MG/DL (ref 0.6–1.3)
CREAT SERPL-MCNC: 0.5 MG/DL (ref 0.6–1.3)
CREAT SERPL-MCNC: 0.51 MG/DL (ref 0.6–1.3)
CREAT SERPL-MCNC: 0.53 MG/DL (ref 0.6–1.3)
CREAT SERPL-MCNC: 0.56 MG/DL (ref 0.6–1.3)
CREAT SERPL-MCNC: 0.6 MG/DL (ref 0.6–1.3)
CREAT SERPL-MCNC: 1.09 MG/DL (ref 0.6–1.3)
CREAT SERPL-MCNC: 1.27 MG/DL (ref 0.6–1.3)
CREAT SERPL-MCNC: 2.69 MG/DL (ref 0.6–1.3)
CREAT SERPL-MCNC: 3.74 MG/DL (ref 0.6–1.3)
CREAT SERPL-MCNC: 4.25 MG/DL (ref 0.6–1.3)
CREAT SERPL-MCNC: 4.51 MG/DL (ref 0.6–1.3)
CREAT SERPL-MCNC: 5.19 MG/DL (ref 0.6–1.3)
CREAT SERPL-MCNC: 5.71 MG/DL (ref 0.6–1.3)
CROSSMATCH RESULT,%XM: NORMAL
DIAGNOSIS, 93000: NORMAL
DIFFERENTIAL METHOD BLD: ABNORMAL
ECHO AO ROOT DIAM: 3.1 CM
ECHO AO ROOT DIAM: 3.21 CM
ECHO LA AREA 4C: 16.4 CM2
ECHO LA VOL 2C: 37.15 ML (ref 22–52)
ECHO LA VOL 4C: 42.37 ML (ref 22–52)
ECHO LA VOL BP: 42.82 ML (ref 22–52)
ECHO LA VOL/BSA BIPLANE: 27.36 ML/M2 (ref 16–28)
ECHO LA VOLUME INDEX A2C: 23.74 ML/M2 (ref 16–28)
ECHO LA VOLUME INDEX A4C: 27.07 ML/M2 (ref 16–28)
ECHO LV EDV A4C: 87.9 ML
ECHO LV EDV INDEX A4C: 54.4 ML/M2
ECHO LV EDV TEICHHOLZ: 0.19 ML
ECHO LV EDV TEICHHOLZ: 0.38 ML
ECHO LV EDV TEICHHOLZ: 0.48 ML
ECHO LV EJECTION FRACTION A4C: 27 %
ECHO LV ESV A4C: 64.2 ML
ECHO LV ESV INDEX A4C: 39.7 ML/M2
ECHO LV ESV TEICHHOLZ: 0.09 ML
ECHO LV ESV TEICHHOLZ: 0.12 ML
ECHO LV ESV TEICHHOLZ: 0.19 ML
ECHO LV INTERNAL DIMENSION DIASTOLIC: 2.74 CM (ref 3.9–5.3)
ECHO LV INTERNAL DIMENSION DIASTOLIC: 3.65 CM (ref 3.9–5.3)
ECHO LV INTERNAL DIMENSION DIASTOLIC: 3.78 CM (ref 3.9–5.3)
ECHO LV INTERNAL DIMENSION SYSTOLIC: 2.06 CM
ECHO LV INTERNAL DIMENSION SYSTOLIC: 2.29 CM
ECHO LV INTERNAL DIMENSION SYSTOLIC: 2.57 CM
ECHO LV IVSD: 0.89 CM (ref 0.6–0.9)
ECHO LV IVSD: 1.17 CM (ref 0.6–0.9)
ECHO LV IVSD: 1.21 CM (ref 0.6–0.9)
ECHO LV MASS 2D: 102.6 G (ref 67–162)
ECHO LV MASS 2D: 110.5 G (ref 67–162)
ECHO LV MASS 2D: 170.7 G (ref 67–162)
ECHO LV MASS INDEX 2D: 105.6 G/M2 (ref 43–95)
ECHO LV MASS INDEX 2D: 65.6 G/M2 (ref 43–95)
ECHO LV MASS INDEX 2D: 66.5 G/M2 (ref 43–95)
ECHO LV POSTERIOR WALL DIASTOLIC: 0.89 CM (ref 0.6–0.9)
ECHO LV POSTERIOR WALL DIASTOLIC: 1.19 CM (ref 0.6–0.9)
ECHO LV POSTERIOR WALL DIASTOLIC: 1.24 CM (ref 0.6–0.9)
ECHO LVOT DIAM: 1.94 CM
ECHO LVOT DIAM: 1.98 CM
ECHO LVOT PEAK GRADIENT: 2.4 MMHG
ECHO LVOT PEAK GRADIENT: 3.3 MMHG
ECHO LVOT PEAK VELOCITY: 77.58 CM/S
ECHO LVOT PEAK VELOCITY: 90.47 CM/S
ECHO LVOT VTI: 13.86 CM
ECHO LVOT VTI: 14.54 CM
ECHO PULMONARY ARTERY SYSTOLIC PRESSURE (PASP): 34 MMHG
ECHO RV TAPSE: 1.65 CM (ref 1.5–2)
ECHO RV TAPSE: 1.78 CM (ref 1.5–2)
ECHO TV REGURGITANT MAX VELOCITY: 262.04 CM/S
ECHO TV REGURGITANT MAX VELOCITY: 276.5 CM/S
ECHO TV REGURGITANT MAX VELOCITY: 303.41 CM/S
ECHO TV REGURGITANT PEAK GRADIENT: 27.5 MMHG
ECHO TV REGURGITANT PEAK GRADIENT: 30.6 MMHG
ECHO TV REGURGITANT PEAK GRADIENT: 36.8 MMHG
EOSINOPHIL # BLD: 0 K/UL (ref 0–0.4)
EOSINOPHIL # BLD: 0.1 K/UL (ref 0–0.4)
EOSINOPHIL # BLD: 0.2 K/UL (ref 0–0.4)
EOSINOPHIL NFR BLD: 0 % (ref 0–5)
EOSINOPHIL NFR BLD: 1 % (ref 0–5)
EPITH CASTS URNS QL MICRO: ABNORMAL /LPF (ref 0–5)
EPITH CASTS URNS QL MICRO: ABNORMAL /LPF (ref 0–5)
ERYTHROCYTE [DISTWIDTH] IN BLOOD BY AUTOMATED COUNT: 11.7 % (ref 11.6–14.5)
ERYTHROCYTE [DISTWIDTH] IN BLOOD BY AUTOMATED COUNT: 11.7 % (ref 11.6–14.5)
ERYTHROCYTE [DISTWIDTH] IN BLOOD BY AUTOMATED COUNT: 12 % (ref 11.6–14.5)
ERYTHROCYTE [DISTWIDTH] IN BLOOD BY AUTOMATED COUNT: 12.2 % (ref 11.6–14.5)
ERYTHROCYTE [DISTWIDTH] IN BLOOD BY AUTOMATED COUNT: 12.2 % (ref 11.6–14.5)
ERYTHROCYTE [DISTWIDTH] IN BLOOD BY AUTOMATED COUNT: 12.4 % (ref 11.6–14.5)
ERYTHROCYTE [DISTWIDTH] IN BLOOD BY AUTOMATED COUNT: 12.5 % (ref 11.6–14.5)
ERYTHROCYTE [DISTWIDTH] IN BLOOD BY AUTOMATED COUNT: 12.6 % (ref 11.6–14.5)
ERYTHROCYTE [DISTWIDTH] IN BLOOD BY AUTOMATED COUNT: 12.8 % (ref 11.6–14.5)
ERYTHROCYTE [DISTWIDTH] IN BLOOD BY AUTOMATED COUNT: 12.9 % (ref 11.6–14.5)
ERYTHROCYTE [DISTWIDTH] IN BLOOD BY AUTOMATED COUNT: 12.9 % (ref 11.6–14.5)
ERYTHROCYTE [DISTWIDTH] IN BLOOD BY AUTOMATED COUNT: 13.1 % (ref 11.6–14.5)
ERYTHROCYTE [DISTWIDTH] IN BLOOD BY AUTOMATED COUNT: 15.1 % (ref 11.6–14.5)
FLUAV AG NPH QL IA: NEGATIVE
FLUBV AG NOSE QL IA: NEGATIVE
GAS FLOW.O2 O2 DELIVERY SYS: ABNORMAL L/MIN
GAS FLOW.O2 SETTING OXYMISER: 15 BPM
GAS FLOW.O2 SETTING OXYMISER: 15 BPM
GAS FLOW.O2 SETTING OXYMISER: 16 BPM
GAS FLOW.O2 SETTING OXYMISER: 18 BPM
GAS FLOW.O2 SETTING OXYMISER: 20 BPM
GLOBULIN SER CALC-MCNC: 4.4 G/DL (ref 2–4)
GLUCOSE BLD STRIP.AUTO-MCNC: 126 MG/DL (ref 70–110)
GLUCOSE BLD STRIP.AUTO-MCNC: 130 MG/DL (ref 70–110)
GLUCOSE BLD STRIP.AUTO-MCNC: 130 MG/DL (ref 70–110)
GLUCOSE BLD STRIP.AUTO-MCNC: 131 MG/DL (ref 70–110)
GLUCOSE BLD STRIP.AUTO-MCNC: 134 MG/DL (ref 70–110)
GLUCOSE BLD STRIP.AUTO-MCNC: 135 MG/DL (ref 70–110)
GLUCOSE BLD STRIP.AUTO-MCNC: 143 MG/DL (ref 70–110)
GLUCOSE BLD STRIP.AUTO-MCNC: 144 MG/DL (ref 70–110)
GLUCOSE BLD STRIP.AUTO-MCNC: 161 MG/DL (ref 70–110)
GLUCOSE BLD STRIP.AUTO-MCNC: 166 MG/DL (ref 70–110)
GLUCOSE BLD STRIP.AUTO-MCNC: 170 MG/DL (ref 70–110)
GLUCOSE BLD STRIP.AUTO-MCNC: 175 MG/DL (ref 70–110)
GLUCOSE BLD STRIP.AUTO-MCNC: 193 MG/DL (ref 70–110)
GLUCOSE BLD STRIP.AUTO-MCNC: 193 MG/DL (ref 70–110)
GLUCOSE BLD STRIP.AUTO-MCNC: 198 MG/DL (ref 70–110)
GLUCOSE BLD STRIP.AUTO-MCNC: 199 MG/DL (ref 70–110)
GLUCOSE BLD STRIP.AUTO-MCNC: 201 MG/DL (ref 70–110)
GLUCOSE BLD STRIP.AUTO-MCNC: 205 MG/DL (ref 70–110)
GLUCOSE BLD STRIP.AUTO-MCNC: 213 MG/DL (ref 70–110)
GLUCOSE BLD STRIP.AUTO-MCNC: 225 MG/DL (ref 70–110)
GLUCOSE BLD STRIP.AUTO-MCNC: 227 MG/DL (ref 70–110)
GLUCOSE BLD STRIP.AUTO-MCNC: 249 MG/DL (ref 70–110)
GLUCOSE BLD STRIP.AUTO-MCNC: 258 MG/DL (ref 70–110)
GLUCOSE BLD STRIP.AUTO-MCNC: 280 MG/DL (ref 70–110)
GLUCOSE BLD STRIP.AUTO-MCNC: 84 MG/DL (ref 70–110)
GLUCOSE BLD STRIP.AUTO-MCNC: 90 MG/DL (ref 70–110)
GLUCOSE BLD STRIP.AUTO-MCNC: 99 MG/DL (ref 70–110)
GLUCOSE SERPL-MCNC: 106 MG/DL (ref 74–99)
GLUCOSE SERPL-MCNC: 116 MG/DL (ref 74–99)
GLUCOSE SERPL-MCNC: 119 MG/DL (ref 74–99)
GLUCOSE SERPL-MCNC: 129 MG/DL (ref 74–99)
GLUCOSE SERPL-MCNC: 138 MG/DL (ref 74–99)
GLUCOSE SERPL-MCNC: 153 MG/DL (ref 74–99)
GLUCOSE SERPL-MCNC: 157 MG/DL (ref 74–99)
GLUCOSE SERPL-MCNC: 162 MG/DL (ref 74–99)
GLUCOSE SERPL-MCNC: 177 MG/DL (ref 74–99)
GLUCOSE SERPL-MCNC: 193 MG/DL (ref 74–99)
GLUCOSE SERPL-MCNC: 210 MG/DL (ref 74–99)
GLUCOSE SERPL-MCNC: 350 MG/DL (ref 74–99)
GLUCOSE SERPL-MCNC: 85 MG/DL (ref 74–99)
GLUCOSE SERPL-MCNC: 96 MG/DL (ref 74–99)
GLUCOSE UR STRIP.AUTO-MCNC: NEGATIVE MG/DL
GLUCOSE UR STRIP.AUTO-MCNC: NEGATIVE MG/DL
GRAM STN SPEC: NORMAL
GRAM STN SPEC: NORMAL
GRAN CASTS URNS QL MICRO: ABNORMAL /LPF
HCO3 BLD-SCNC: 20.2 MMOL/L (ref 22–26)
HCO3 BLD-SCNC: 23.1 MMOL/L (ref 22–26)
HCO3 BLD-SCNC: 26 MMOL/L (ref 22–26)
HCO3 BLD-SCNC: 28.1 MMOL/L (ref 22–26)
HCO3 BLD-SCNC: 29 MMOL/L (ref 22–26)
HCO3 BLD-SCNC: 32 MMOL/L (ref 22–26)
HCO3 BLD-SCNC: 32.9 MMOL/L (ref 22–26)
HCO3 BLD-SCNC: 34.2 MMOL/L (ref 22–26)
HCO3 BLD-SCNC: 34.3 MMOL/L (ref 22–26)
HCO3 BLD-SCNC: 36.5 MMOL/L (ref 22–26)
HCT VFR BLD AUTO: 18.3 % (ref 35–45)
HCT VFR BLD AUTO: 19.3 % (ref 35–45)
HCT VFR BLD AUTO: 21.9 % (ref 35–45)
HCT VFR BLD AUTO: 26 % (ref 35–45)
HCT VFR BLD AUTO: 26.2 % (ref 35–45)
HCT VFR BLD AUTO: 29.6 % (ref 35–45)
HCT VFR BLD AUTO: 30.7 % (ref 35–45)
HCT VFR BLD AUTO: 30.7 % (ref 35–45)
HCT VFR BLD AUTO: 31.4 % (ref 35–45)
HCT VFR BLD AUTO: 31.4 % (ref 35–45)
HCT VFR BLD AUTO: 31.7 % (ref 35–45)
HCT VFR BLD AUTO: 31.9 % (ref 35–45)
HCT VFR BLD AUTO: 33.4 % (ref 35–45)
HGB BLD-MCNC: 10.2 G/DL (ref 12–16)
HGB BLD-MCNC: 10.4 G/DL (ref 12–16)
HGB BLD-MCNC: 10.5 G/DL (ref 12–16)
HGB BLD-MCNC: 10.6 G/DL (ref 12–16)
HGB BLD-MCNC: 10.6 G/DL (ref 12–16)
HGB BLD-MCNC: 11 G/DL (ref 12–16)
HGB BLD-MCNC: 11.9 G/DL (ref 12–16)
HGB BLD-MCNC: 6.2 G/DL (ref 12–16)
HGB BLD-MCNC: 6.5 G/DL (ref 12–16)
HGB BLD-MCNC: 7.4 G/DL (ref 12–16)
HGB BLD-MCNC: 8.7 G/DL (ref 12–16)
HGB BLD-MCNC: 8.7 G/DL (ref 12–16)
HGB BLD-MCNC: 9.7 G/DL (ref 12–16)
HGB UR QL STRIP: ABNORMAL
HGB UR QL STRIP: NEGATIVE
HYALINE CASTS URNS QL MICRO: ABNORMAL /LPF (ref 0–2)
INR PPP: 1.2 (ref 0.8–1.2)
INSPIRATION.DURATION SETTING TIME VENT: 0.9 SEC
INSPIRATION.DURATION SETTING TIME VENT: 1 SEC
KETONES UR QL STRIP.AUTO: 40 MG/DL
KETONES UR QL STRIP.AUTO: NEGATIVE MG/DL
LACTATE BLD-SCNC: 0.95 MMOL/L (ref 0.4–2)
LACTATE SERPL-SCNC: 1.1 MMOL/L (ref 0.4–2)
LACTATE SERPL-SCNC: 3.4 MMOL/L (ref 0.4–2)
LACTATE SERPL-SCNC: 6.3 MMOL/L (ref 0.4–2)
LEUKOCYTE ESTERASE UR QL STRIP.AUTO: ABNORMAL
LEUKOCYTE ESTERASE UR QL STRIP.AUTO: NEGATIVE
LVFS 2D: 16.35 %
LVFS 2D: 32.02 %
LVFS 2D: 43.67 %
LVOT MG: 1.09 MMHG
LVOT MG: 1.57 MMHG
LVOT MV: 0.48 CM/S
LVOT MV: 0.57 CM/S
LVSV (MOD SINGLE 4C): 15.25 ML
LVSV (TEICH): 23.99 ML
LVSV (TEICH): 26.45 ML
LVSV (TEICH): 5.81 ML
LYMPHOCYTES # BLD: 0.4 K/UL (ref 0.8–3.5)
LYMPHOCYTES # BLD: 0.5 K/UL (ref 0.9–3.6)
LYMPHOCYTES # BLD: 0.6 K/UL (ref 0.9–3.6)
LYMPHOCYTES # BLD: 0.7 K/UL (ref 0.8–3.5)
LYMPHOCYTES # BLD: 0.8 K/UL (ref 0.8–3.5)
LYMPHOCYTES # BLD: 0.8 K/UL (ref 0.9–3.6)
LYMPHOCYTES # BLD: 0.9 K/UL (ref 0.8–3.5)
LYMPHOCYTES # BLD: 1 K/UL (ref 0.9–3.6)
LYMPHOCYTES # BLD: 1.2 K/UL (ref 0.9–3.6)
LYMPHOCYTES # BLD: 1.3 K/UL (ref 0.8–3.5)
LYMPHOCYTES # BLD: 1.5 K/UL (ref 0.8–3.5)
LYMPHOCYTES # BLD: 1.9 K/UL (ref 0.8–3.5)
LYMPHOCYTES # BLD: 2.3 K/UL (ref 0.8–3.5)
LYMPHOCYTES NFR BLD: 11 % (ref 21–52)
LYMPHOCYTES NFR BLD: 2 % (ref 20–51)
LYMPHOCYTES NFR BLD: 3 % (ref 20–51)
LYMPHOCYTES NFR BLD: 3 % (ref 20–51)
LYMPHOCYTES NFR BLD: 3 % (ref 21–52)
LYMPHOCYTES NFR BLD: 4 % (ref 20–51)
LYMPHOCYTES NFR BLD: 4 % (ref 20–51)
LYMPHOCYTES NFR BLD: 5 % (ref 20–51)
LYMPHOCYTES NFR BLD: 5 % (ref 21–52)
LYMPHOCYTES NFR BLD: 6 % (ref 21–52)
LYMPHOCYTES NFR BLD: 7 % (ref 20–51)
LYMPHOCYTES NFR BLD: 8 % (ref 20–51)
LYMPHOCYTES NFR BLD: 9 % (ref 21–52)
MAGNESIUM SERPL-MCNC: 1.8 MG/DL (ref 1.6–2.6)
MAGNESIUM SERPL-MCNC: 1.9 MG/DL (ref 1.6–2.6)
MAGNESIUM SERPL-MCNC: 2 MG/DL (ref 1.6–2.6)
MAGNESIUM SERPL-MCNC: 2.1 MG/DL (ref 1.6–2.6)
MAGNESIUM SERPL-MCNC: 2.2 MG/DL (ref 1.6–2.6)
MAGNESIUM SERPL-MCNC: 2.3 MG/DL (ref 1.6–2.6)
MCH RBC QN AUTO: 31.2 PG (ref 24–34)
MCH RBC QN AUTO: 31.7 PG (ref 24–34)
MCH RBC QN AUTO: 32.1 PG (ref 24–34)
MCH RBC QN AUTO: 32.2 PG (ref 24–34)
MCH RBC QN AUTO: 32.3 PG (ref 24–34)
MCH RBC QN AUTO: 32.4 PG (ref 24–34)
MCH RBC QN AUTO: 32.7 PG (ref 24–34)
MCH RBC QN AUTO: 32.8 PG (ref 24–34)
MCHC RBC AUTO-ENTMCNC: 32.6 G/DL (ref 31–37)
MCHC RBC AUTO-ENTMCNC: 32.8 G/DL (ref 31–37)
MCHC RBC AUTO-ENTMCNC: 33.1 G/DL (ref 31–37)
MCHC RBC AUTO-ENTMCNC: 33.2 G/DL (ref 31–37)
MCHC RBC AUTO-ENTMCNC: 33.2 G/DL (ref 31–37)
MCHC RBC AUTO-ENTMCNC: 33.5 G/DL (ref 31–37)
MCHC RBC AUTO-ENTMCNC: 33.7 G/DL (ref 31–37)
MCHC RBC AUTO-ENTMCNC: 33.8 G/DL (ref 31–37)
MCHC RBC AUTO-ENTMCNC: 33.8 G/DL (ref 31–37)
MCHC RBC AUTO-ENTMCNC: 33.9 G/DL (ref 31–37)
MCHC RBC AUTO-ENTMCNC: 34.5 G/DL (ref 31–37)
MCHC RBC AUTO-ENTMCNC: 35 G/DL (ref 31–37)
MCHC RBC AUTO-ENTMCNC: 35.6 G/DL (ref 31–37)
MCV RBC AUTO: 100.6 FL (ref 74–97)
MCV RBC AUTO: 91.5 FL (ref 74–97)
MCV RBC AUTO: 91.8 FL (ref 74–97)
MCV RBC AUTO: 93.6 FL (ref 74–97)
MCV RBC AUTO: 93.9 FL (ref 74–97)
MCV RBC AUTO: 95.3 FL (ref 74–97)
MCV RBC AUTO: 95.3 FL (ref 74–97)
MCV RBC AUTO: 96 FL (ref 74–97)
MCV RBC AUTO: 96.7 FL (ref 74–97)
MCV RBC AUTO: 96.9 FL (ref 74–97)
MCV RBC AUTO: 97 FL (ref 74–97)
MCV RBC AUTO: 97.5 FL (ref 74–97)
MCV RBC AUTO: 98.3 FL (ref 74–97)
METAMYELOCYTES NFR BLD MANUAL: 1 %
METAMYELOCYTES NFR BLD MANUAL: 1 %
METAMYELOCYTES NFR BLD MANUAL: 4 %
MONOCYTES # BLD: 0.5 K/UL (ref 0.05–1.2)
MONOCYTES # BLD: 0.5 K/UL (ref 0–1)
MONOCYTES # BLD: 0.5 K/UL (ref 0–1)
MONOCYTES # BLD: 0.6 K/UL (ref 0.05–1.2)
MONOCYTES # BLD: 0.8 K/UL (ref 0–1)
MONOCYTES # BLD: 0.9 K/UL (ref 0.05–1.2)
MONOCYTES # BLD: 0.9 K/UL (ref 0.05–1.2)
MONOCYTES # BLD: 0.9 K/UL (ref 0–1)
MONOCYTES # BLD: 1 K/UL (ref 0–1)
MONOCYTES # BLD: 1 K/UL (ref 0–1)
MONOCYTES # BLD: 1.2 K/UL (ref 0–1)
MONOCYTES # BLD: 1.3 K/UL (ref 0–1)
MONOCYTES # BLD: 1.5 K/UL (ref 0.05–1.2)
MONOCYTES NFR BLD: 1 % (ref 2–9)
MONOCYTES NFR BLD: 10 % (ref 3–10)
MONOCYTES NFR BLD: 2 % (ref 2–9)
MONOCYTES NFR BLD: 3 % (ref 2–9)
MONOCYTES NFR BLD: 3 % (ref 2–9)
MONOCYTES NFR BLD: 4 % (ref 2–9)
MONOCYTES NFR BLD: 4 % (ref 3–10)
MONOCYTES NFR BLD: 5 % (ref 2–9)
MONOCYTES NFR BLD: 6 % (ref 2–9)
MONOCYTES NFR BLD: 6 % (ref 2–9)
MONOCYTES NFR BLD: 6 % (ref 3–10)
MONOCYTES NFR BLD: 7 % (ref 3–10)
MONOCYTES NFR BLD: 8 % (ref 3–10)
MYELOCYTES NFR BLD MANUAL: 1 %
NEUTS BAND NFR BLD MANUAL: 1 % (ref 0–5)
NEUTS BAND NFR BLD MANUAL: 13 % (ref 0–5)
NEUTS BAND NFR BLD MANUAL: 18 % (ref 0–5)
NEUTS BAND NFR BLD MANUAL: 22 % (ref 0–5)
NEUTS BAND NFR BLD MANUAL: 4 % (ref 0–5)
NEUTS BAND NFR BLD MANUAL: 6 % (ref 0–5)
NEUTS SEG # BLD: 10.9 K/UL (ref 1.8–8)
NEUTS SEG # BLD: 11.1 K/UL (ref 1.8–8)
NEUTS SEG # BLD: 12.5 K/UL (ref 1.8–8)
NEUTS SEG # BLD: 14.4 K/UL (ref 1.8–8)
NEUTS SEG # BLD: 14.7 K/UL (ref 1.8–8)
NEUTS SEG # BLD: 18.5 K/UL (ref 1.8–8)
NEUTS SEG # BLD: 20.9 K/UL (ref 1.8–8)
NEUTS SEG # BLD: 21.1 K/UL (ref 1.8–8)
NEUTS SEG # BLD: 23.5 K/UL (ref 1.8–8)
NEUTS SEG # BLD: 24.6 K/UL (ref 1.8–8)
NEUTS SEG # BLD: 28.4 K/UL (ref 1.8–8)
NEUTS SEG # BLD: 46.8 K/UL (ref 1.8–8)
NEUTS SEG # BLD: 6.1 K/UL (ref 1.8–8)
NEUTS SEG NFR BLD: 69 % (ref 42–75)
NEUTS SEG NFR BLD: 72 % (ref 42–75)
NEUTS SEG NFR BLD: 73 % (ref 42–75)
NEUTS SEG NFR BLD: 81 % (ref 40–73)
NEUTS SEG NFR BLD: 83 % (ref 40–73)
NEUTS SEG NFR BLD: 84 % (ref 40–73)
NEUTS SEG NFR BLD: 86 % (ref 42–75)
NEUTS SEG NFR BLD: 90 % (ref 42–75)
NEUTS SEG NFR BLD: 90 % (ref 42–75)
NEUTS SEG NFR BLD: 91 % (ref 40–73)
NEUTS SEG NFR BLD: 91 % (ref 40–73)
NEUTS SEG NFR BLD: 91 % (ref 42–75)
NEUTS SEG NFR BLD: 92 % (ref 42–75)
NITRITE UR QL STRIP.AUTO: NEGATIVE
NITRITE UR QL STRIP.AUTO: NEGATIVE
O2/TOTAL GAS SETTING VFR VENT: 100 %
O2/TOTAL GAS SETTING VFR VENT: 30 %
O2/TOTAL GAS SETTING VFR VENT: 40 %
O2/TOTAL GAS SETTING VFR VENT: 45 %
O2/TOTAL GAS SETTING VFR VENT: 80 %
O2/TOTAL GAS SETTING VFR VENT: 80 %
O2/TOTAL GAS SETTING VFR VENT: 90 %
OSMOLALITY SERPL: 275 MOSM/KG H2O (ref 280–301)
OSMOLALITY UR: 554 MOSM/KG H2O (ref 50–1400)
P-R INTERVAL, ECG05: 206 MS
PCO2 BLD: 35.9 MMHG (ref 35–45)
PCO2 BLD: 38.1 MMHG (ref 35–45)
PCO2 BLD: 39.7 MMHG (ref 35–45)
PCO2 BLD: 41.6 MMHG (ref 35–45)
PCO2 BLD: 41.7 MMHG (ref 35–45)
PCO2 BLD: 42.5 MMHG (ref 35–45)
PCO2 BLD: 44.4 MMHG (ref 35–45)
PCO2 BLD: 48.9 MMHG (ref 35–45)
PCO2 BLD: 50.1 MMHG (ref 35–45)
PCO2 BLD: 62.8 MMHG (ref 35–45)
PEEP RESPIRATORY: 10 CMH2O
PEEP RESPIRATORY: 12 CMH2O
PEEP RESPIRATORY: 12 CMH2O
PEEP RESPIRATORY: 15 CMH2O
PEEP RESPIRATORY: 5 CMH2O
PEEP RESPIRATORY: 6 CMH2O
PEEP RESPIRATORY: 8 CMH2O
PH BLD: 7.34 [PH] (ref 7.35–7.45)
PH BLD: 7.34 [PH] (ref 7.35–7.45)
PH BLD: 7.36 [PH] (ref 7.35–7.45)
PH BLD: 7.36 [PH] (ref 7.35–7.45)
PH BLD: 7.4 [PH] (ref 7.35–7.45)
PH BLD: 7.48 [PH] (ref 7.35–7.45)
PH BLD: 7.49 [PH] (ref 7.35–7.45)
PH BLD: 7.53 [PH] (ref 7.35–7.45)
PH UR STRIP: 5.5 [PH] (ref 5–8)
PH UR STRIP: 5.5 [PH] (ref 5–8)
PHOSPHATE SERPL-MCNC: 4.5 MG/DL (ref 2.5–4.9)
PHOSPHATE SERPL-MCNC: 6 MG/DL (ref 2.5–4.9)
PIP ISTAT,IPIP: 15
PIP ISTAT,IPIP: 26
PIP ISTAT,IPIP: 27
PIP ISTAT,IPIP: 27
PIP ISTAT,IPIP: 30
PIP ISTAT,IPIP: 32
PLATELET # BLD AUTO: 159 K/UL (ref 135–420)
PLATELET # BLD AUTO: 170 K/UL (ref 135–420)
PLATELET # BLD AUTO: 173 K/UL (ref 135–420)
PLATELET # BLD AUTO: 209 K/UL (ref 135–420)
PLATELET # BLD AUTO: 314 K/UL (ref 135–420)
PLATELET # BLD AUTO: 352 K/UL (ref 135–420)
PLATELET # BLD AUTO: 353 K/UL (ref 135–420)
PLATELET # BLD AUTO: 395 K/UL (ref 135–420)
PLATELET # BLD AUTO: 401 K/UL (ref 135–420)
PLATELET # BLD AUTO: 415 K/UL (ref 135–420)
PLATELET # BLD AUTO: 420 K/UL (ref 135–420)
PLATELET # BLD AUTO: 427 K/UL (ref 135–420)
PLATELET # BLD AUTO: 448 K/UL (ref 135–420)
PLATELET COMMENTS,PCOM: ABNORMAL
PMV BLD AUTO: 10 FL (ref 9.2–11.8)
PMV BLD AUTO: 10.2 FL (ref 9.2–11.8)
PMV BLD AUTO: 10.8 FL (ref 9.2–11.8)
PMV BLD AUTO: 11.4 FL (ref 9.2–11.8)
PMV BLD AUTO: 8.9 FL (ref 9.2–11.8)
PMV BLD AUTO: 8.9 FL (ref 9.2–11.8)
PMV BLD AUTO: 9.1 FL (ref 9.2–11.8)
PMV BLD AUTO: 9.4 FL (ref 9.2–11.8)
PMV BLD AUTO: 9.5 FL (ref 9.2–11.8)
PMV BLD AUTO: 9.6 FL (ref 9.2–11.8)
PMV BLD AUTO: 9.6 FL (ref 9.2–11.8)
PO2 BLD: 101 MMHG (ref 80–100)
PO2 BLD: 109 MMHG (ref 80–100)
PO2 BLD: 112 MMHG (ref 80–100)
PO2 BLD: 194 MMHG (ref 80–100)
PO2 BLD: 70 MMHG (ref 80–100)
PO2 BLD: 75 MMHG (ref 80–100)
PO2 BLD: 77 MMHG (ref 80–100)
PO2 BLD: 89 MMHG (ref 80–100)
PO2 BLD: 90 MMHG (ref 80–100)
PO2 BLD: 95 MMHG (ref 80–100)
POTASSIUM SERPL-SCNC: 3.1 MMOL/L (ref 3.5–5.5)
POTASSIUM SERPL-SCNC: 3.1 MMOL/L (ref 3.5–5.5)
POTASSIUM SERPL-SCNC: 3.2 MMOL/L (ref 3.5–5.5)
POTASSIUM SERPL-SCNC: 3.2 MMOL/L (ref 3.5–5.5)
POTASSIUM SERPL-SCNC: 3.4 MMOL/L (ref 3.5–5.5)
POTASSIUM SERPL-SCNC: 3.6 MMOL/L (ref 3.5–5.5)
POTASSIUM SERPL-SCNC: 3.7 MMOL/L (ref 3.5–5.5)
POTASSIUM SERPL-SCNC: 3.8 MMOL/L (ref 3.5–5.5)
POTASSIUM SERPL-SCNC: 3.8 MMOL/L (ref 3.5–5.5)
POTASSIUM SERPL-SCNC: 4 MMOL/L (ref 3.5–5.5)
POTASSIUM SERPL-SCNC: 4.1 MMOL/L (ref 3.5–5.5)
POTASSIUM SERPL-SCNC: 4.1 MMOL/L (ref 3.5–5.5)
PROCALCITONIN SERPL-MCNC: 4.75 NG/ML
PROT SERPL-MCNC: 6.6 G/DL (ref 6.4–8.2)
PROT UR STRIP-MCNC: 300 MG/DL
PROT UR STRIP-MCNC: >1000 MG/DL
PROTHROMBIN TIME: 14.7 SEC (ref 11.5–15.2)
Q-T INTERVAL, ECG07: 266 MS
Q-T INTERVAL, ECG07: 314 MS
Q-T INTERVAL, ECG07: 378 MS
QRS DURATION, ECG06: 74 MS
QRS DURATION, ECG06: 76 MS
QRS DURATION, ECG06: 80 MS
QTC CALCULATION (BEZET), ECG08: 430 MS
QTC CALCULATION (BEZET), ECG08: 439 MS
QTC CALCULATION (BEZET), ECG08: 465 MS
RBC # BLD AUTO: 1.92 M/UL (ref 4.2–5.3)
RBC # BLD AUTO: 1.99 M/UL (ref 4.2–5.3)
RBC # BLD AUTO: 2.26 M/UL (ref 4.2–5.3)
RBC # BLD AUTO: 2.69 M/UL (ref 4.2–5.3)
RBC # BLD AUTO: 2.79 M/UL (ref 4.2–5.3)
RBC # BLD AUTO: 3.01 M/UL (ref 4.2–5.3)
RBC # BLD AUTO: 3.17 M/UL (ref 4.2–5.3)
RBC # BLD AUTO: 3.22 M/UL (ref 4.2–5.3)
RBC # BLD AUTO: 3.25 M/UL (ref 4.2–5.3)
RBC # BLD AUTO: 3.27 M/UL (ref 4.2–5.3)
RBC # BLD AUTO: 3.28 M/UL (ref 4.2–5.3)
RBC # BLD AUTO: 3.43 M/UL (ref 4.2–5.3)
RBC # BLD AUTO: 3.64 M/UL (ref 4.2–5.3)
RBC #/AREA URNS HPF: ABNORMAL /HPF (ref 0–5)
RBC #/AREA URNS HPF: ABNORMAL /HPF (ref 0–5)
RBC MORPH BLD: ABNORMAL
SAO2 % BLD: 100 % (ref 92–97)
SAO2 % BLD: 93 % (ref 92–97)
SAO2 % BLD: 94 % (ref 92–97)
SAO2 % BLD: 95 % (ref 92–97)
SAO2 % BLD: 97 % (ref 92–97)
SAO2 % BLD: 97 % (ref 92–97)
SAO2 % BLD: 98 % (ref 92–97)
SAO2 % BLD: 99 % (ref 92–97)
SERVICE CMNT-IMP: ABNORMAL
SERVICE CMNT-IMP: NORMAL
SODIUM SERPL-SCNC: 125 MMOL/L (ref 136–145)
SODIUM SERPL-SCNC: 129 MMOL/L (ref 136–145)
SODIUM SERPL-SCNC: 134 MMOL/L (ref 136–145)
SODIUM SERPL-SCNC: 135 MMOL/L (ref 136–145)
SODIUM SERPL-SCNC: 136 MMOL/L (ref 136–145)
SODIUM SERPL-SCNC: 138 MMOL/L (ref 136–145)
SODIUM SERPL-SCNC: 139 MMOL/L (ref 136–145)
SODIUM SERPL-SCNC: 139 MMOL/L (ref 136–145)
SODIUM SERPL-SCNC: 140 MMOL/L (ref 136–145)
SODIUM UR-SCNC: 15 MMOL/L (ref 20–110)
SP GR UR REFRACTOMETRY: 1.02 (ref 1–1.03)
SP GR UR REFRACTOMETRY: >1.03 (ref 1–1.03)
SPECIMEN EXP DATE BLD: NORMAL
SPECIMEN EXP DATE BLD: NORMAL
SPECIMEN TYPE: ABNORMAL
STATUS OF UNIT,%ST: NORMAL
TOTAL RESP. RATE, ITRR: 16
TOTAL RESP. RATE, ITRR: 16
TOTAL RESP. RATE, ITRR: 17
TOTAL RESP. RATE, ITRR: 18
TOTAL RESP. RATE, ITRR: 19
TOTAL RESP. RATE, ITRR: 20
TOTAL RESP. RATE, ITRR: 33
TROPONIN I SERPL-MCNC: <0.02 NG/ML (ref 0–0.04)
TROPONIN I SERPL-MCNC: <0.02 NG/ML (ref 0–0.04)
TSH SERPL DL<=0.05 MIU/L-ACNC: 2.63 UIU/ML (ref 0.36–3.74)
UNIT DIVISION, %UDIV: 0
UROBILINOGEN UR QL STRIP.AUTO: 0.2 EU/DL (ref 0.2–1)
UROBILINOGEN UR QL STRIP.AUTO: 1 EU/DL (ref 0.2–1)
VANCOMYCIN SERPL-MCNC: 13.3 UG/ML (ref 5–40)
VANCOMYCIN SERPL-MCNC: 21 UG/ML (ref 5–40)
VENTILATION MODE VENT: ABNORMAL
VENTRICULAR RATE, ECG03: 132 BPM
VENTRICULAR RATE, ECG03: 164 BPM
VENTRICULAR RATE, ECG03: 78 BPM
VOLUME CONTROL PLUS IVLCP: YES
VT SETTING VENT: 300 ML
VT SETTING VENT: 450 ML
VT SETTING VENT: 450 ML
WBC # BLD AUTO: 12 K/UL (ref 4.6–13.2)
WBC # BLD AUTO: 13.4 K/UL (ref 4.6–13.2)
WBC # BLD AUTO: 15 K/UL (ref 4.6–13.2)
WBC # BLD AUTO: 15.9 K/UL (ref 4.6–13.2)
WBC # BLD AUTO: 16.4 K/UL (ref 4.6–13.2)
WBC # BLD AUTO: 20.1 K/UL (ref 4.6–13.2)
WBC # BLD AUTO: 23 K/UL (ref 4.6–13.2)
WBC # BLD AUTO: 23.5 K/UL (ref 4.6–13.2)
WBC # BLD AUTO: 26.1 K/UL (ref 4.6–13.2)
WBC # BLD AUTO: 26.7 K/UL (ref 4.6–13.2)
WBC # BLD AUTO: 33 K/UL (ref 4.6–13.2)
WBC # BLD AUTO: 48.8 K/UL (ref 4.6–13.2)
WBC # BLD AUTO: 7.5 K/UL (ref 4.6–13.2)
WBC MORPH BLD: ABNORMAL
WBC URNS QL MICRO: ABNORMAL /HPF (ref 0–4)
WBC URNS QL MICRO: ABNORMAL /HPF (ref 0–4)

## 2020-01-01 PROCEDURE — 85025 COMPLETE CBC W/AUTO DIFF WBC: CPT

## 2020-01-01 PROCEDURE — 74011250636 HC RX REV CODE- 250/636: Performed by: INTERNAL MEDICINE

## 2020-01-01 PROCEDURE — 93308 TTE F-UP OR LMTD: CPT

## 2020-01-01 PROCEDURE — 94002 VENT MGMT INPAT INIT DAY: CPT

## 2020-01-01 PROCEDURE — 74011636637 HC RX REV CODE- 636/637: Performed by: INTERNAL MEDICINE

## 2020-01-01 PROCEDURE — 74011250636 HC RX REV CODE- 250/636: Performed by: PHYSICIAN ASSISTANT

## 2020-01-01 PROCEDURE — 74011000250 HC RX REV CODE- 250: Performed by: STUDENT IN AN ORGANIZED HEALTH CARE EDUCATION/TRAINING PROGRAM

## 2020-01-01 PROCEDURE — 74011250637 HC RX REV CODE- 250/637: Performed by: STUDENT IN AN ORGANIZED HEALTH CARE EDUCATION/TRAINING PROGRAM

## 2020-01-01 PROCEDURE — 74011250636 HC RX REV CODE- 250/636

## 2020-01-01 PROCEDURE — 74011250637 HC RX REV CODE- 250/637: Performed by: PHYSICIAN ASSISTANT

## 2020-01-01 PROCEDURE — 74011250636 HC RX REV CODE- 250/636: Performed by: STUDENT IN AN ORGANIZED HEALTH CARE EDUCATION/TRAINING PROGRAM

## 2020-01-01 PROCEDURE — 94003 VENT MGMT INPAT SUBQ DAY: CPT

## 2020-01-01 PROCEDURE — P9047 ALBUMIN (HUMAN), 25%, 50ML: HCPCS | Performed by: INTERNAL MEDICINE

## 2020-01-01 PROCEDURE — 65610000006 HC RM INTENSIVE CARE

## 2020-01-01 PROCEDURE — 74011250637 HC RX REV CODE- 250/637: Performed by: INTERNAL MEDICINE

## 2020-01-01 PROCEDURE — 80202 ASSAY OF VANCOMYCIN: CPT

## 2020-01-01 PROCEDURE — 97535 SELF CARE MNGMENT TRAINING: CPT

## 2020-01-01 PROCEDURE — 36600 WITHDRAWAL OF ARTERIAL BLOOD: CPT

## 2020-01-01 PROCEDURE — 87086 URINE CULTURE/COLONY COUNT: CPT

## 2020-01-01 PROCEDURE — 36592 COLLECT BLOOD FROM PICC: CPT

## 2020-01-01 PROCEDURE — P9045 ALBUMIN (HUMAN), 5%, 250 ML: HCPCS | Performed by: INTERNAL MEDICINE

## 2020-01-01 PROCEDURE — C1751 CATH, INF, PER/CENT/MIDLINE: HCPCS

## 2020-01-01 PROCEDURE — 74011000250 HC RX REV CODE- 250

## 2020-01-01 PROCEDURE — 74011000250 HC RX REV CODE- 250: Performed by: PHYSICIAN ASSISTANT

## 2020-01-01 PROCEDURE — 74011000250 HC RX REV CODE- 250: Performed by: INTERNAL MEDICINE

## 2020-01-01 PROCEDURE — 77030018846 HC SOL IRR STRL H20 ICUM -A

## 2020-01-01 PROCEDURE — 74011000258 HC RX REV CODE- 258: Performed by: FAMILY MEDICINE

## 2020-01-01 PROCEDURE — 83735 ASSAY OF MAGNESIUM: CPT

## 2020-01-01 PROCEDURE — 74011250636 HC RX REV CODE- 250/636: Performed by: NURSE PRACTITIONER

## 2020-01-01 PROCEDURE — 82962 GLUCOSE BLOOD TEST: CPT

## 2020-01-01 PROCEDURE — 77030040830 HC CATH URETH FOL MDII -A

## 2020-01-01 PROCEDURE — 82330 ASSAY OF CALCIUM: CPT

## 2020-01-01 PROCEDURE — 77030038269 HC DRN EXT URIN PURWCK BARD -A

## 2020-01-01 PROCEDURE — 74011000258 HC RX REV CODE- 258: Performed by: PHYSICIAN ASSISTANT

## 2020-01-01 PROCEDURE — 36415 COLL VENOUS BLD VENIPUNCTURE: CPT

## 2020-01-01 PROCEDURE — 92950 HEART/LUNG RESUSCITATION CPR: CPT

## 2020-01-01 PROCEDURE — 94640 AIRWAY INHALATION TREATMENT: CPT

## 2020-01-01 PROCEDURE — 83930 ASSAY OF BLOOD OSMOLALITY: CPT

## 2020-01-01 PROCEDURE — 77030037878 HC DRSG MEPILEX >48IN BORD MOLN -B

## 2020-01-01 PROCEDURE — 71045 X-RAY EXAM CHEST 1 VIEW: CPT

## 2020-01-01 PROCEDURE — 81001 URINALYSIS AUTO W/SCOPE: CPT

## 2020-01-01 PROCEDURE — P9016 RBC LEUKOCYTES REDUCED: HCPCS

## 2020-01-01 PROCEDURE — 77010033678 HC OXYGEN DAILY

## 2020-01-01 PROCEDURE — 74011000258 HC RX REV CODE- 258: Performed by: STUDENT IN AN ORGANIZED HEALTH CARE EDUCATION/TRAINING PROGRAM

## 2020-01-01 PROCEDURE — 97530 THERAPEUTIC ACTIVITIES: CPT

## 2020-01-01 PROCEDURE — 0BJ08ZZ INSPECTION OF TRACHEOBRONCHIAL TREE, VIA NATURAL OR ARTIFICIAL OPENING ENDOSCOPIC: ICD-10-PCS | Performed by: INTERNAL MEDICINE

## 2020-01-01 PROCEDURE — 84484 ASSAY OF TROPONIN QUANT: CPT

## 2020-01-01 PROCEDURE — 65660000000 HC RM CCU STEPDOWN

## 2020-01-01 PROCEDURE — 82803 BLOOD GASES ANY COMBINATION: CPT

## 2020-01-01 PROCEDURE — 74011250637 HC RX REV CODE- 250/637

## 2020-01-01 PROCEDURE — 87040 BLOOD CULTURE FOR BACTERIA: CPT

## 2020-01-01 PROCEDURE — 0BH17EZ INSERTION OF ENDOTRACHEAL AIRWAY INTO TRACHEA, VIA NATURAL OR ARTIFICIAL OPENING: ICD-10-PCS | Performed by: SURGERY

## 2020-01-01 PROCEDURE — 80069 RENAL FUNCTION PANEL: CPT

## 2020-01-01 PROCEDURE — 82550 ASSAY OF CK (CPK): CPT

## 2020-01-01 PROCEDURE — 84145 PROCALCITONIN (PCT): CPT

## 2020-01-01 PROCEDURE — 86900 BLOOD TYPING SEROLOGIC ABO: CPT

## 2020-01-01 PROCEDURE — 83605 ASSAY OF LACTIC ACID: CPT

## 2020-01-01 PROCEDURE — 80048 BASIC METABOLIC PNL TOTAL CA: CPT

## 2020-01-01 PROCEDURE — 84100 ASSAY OF PHOSPHORUS: CPT

## 2020-01-01 PROCEDURE — 97165 OT EVAL LOW COMPLEX 30 MIN: CPT

## 2020-01-01 PROCEDURE — 94400 HC END TIDAL CO2 RESPONSE CURVE: CPT

## 2020-01-01 PROCEDURE — 74011636637 HC RX REV CODE- 636/637: Performed by: NURSE PRACTITIONER

## 2020-01-01 PROCEDURE — 82140 ASSAY OF AMMONIA: CPT

## 2020-01-01 PROCEDURE — 74011250637 HC RX REV CODE- 250/637: Performed by: EMERGENCY MEDICINE

## 2020-01-01 PROCEDURE — 83935 ASSAY OF URINE OSMOLALITY: CPT

## 2020-01-01 PROCEDURE — 97161 PT EVAL LOW COMPLEX 20 MIN: CPT

## 2020-01-01 PROCEDURE — 87070 CULTURE OTHR SPECIMN AEROBIC: CPT

## 2020-01-01 PROCEDURE — 77030019896 HC KT ARTERIAL LN TELE -B

## 2020-01-01 PROCEDURE — 84443 ASSAY THYROID STIM HORMONE: CPT

## 2020-01-01 PROCEDURE — 74011250636 HC RX REV CODE- 250/636: Performed by: FAMILY MEDICINE

## 2020-01-01 PROCEDURE — 94762 N-INVAS EAR/PLS OXIMTRY CONT: CPT

## 2020-01-01 PROCEDURE — 96365 THER/PROPH/DIAG IV INF INIT: CPT

## 2020-01-01 PROCEDURE — 51798 US URINE CAPACITY MEASURE: CPT

## 2020-01-01 PROCEDURE — 93005 ELECTROCARDIOGRAM TRACING: CPT

## 2020-01-01 PROCEDURE — 74011000258 HC RX REV CODE- 258: Performed by: INTERNAL MEDICINE

## 2020-01-01 PROCEDURE — 87804 INFLUENZA ASSAY W/OPTIC: CPT

## 2020-01-01 PROCEDURE — 5A1955Z RESPIRATORY VENTILATION, GREATER THAN 96 CONSECUTIVE HOURS: ICD-10-PCS | Performed by: SURGERY

## 2020-01-01 PROCEDURE — 85610 PROTHROMBIN TIME: CPT

## 2020-01-01 PROCEDURE — 36430 TRANSFUSION BLD/BLD COMPNT: CPT

## 2020-01-01 PROCEDURE — 36620 INSERTION CATHETER ARTERY: CPT

## 2020-01-01 PROCEDURE — C9113 INJ PANTOPRAZOLE SODIUM, VIA: HCPCS | Performed by: PHYSICIAN ASSISTANT

## 2020-01-01 PROCEDURE — 36556 INSERT NON-TUNNEL CV CATH: CPT

## 2020-01-01 PROCEDURE — 99285 EMERGENCY DEPT VISIT HI MDM: CPT

## 2020-01-01 PROCEDURE — 77030013797 HC KT TRNSDUC PRSSR EDWD -A

## 2020-01-01 PROCEDURE — 77030008771 HC TU NG SALEM SUMP -A

## 2020-01-01 PROCEDURE — 03HY32Z INSERTION OF MONITORING DEVICE INTO UPPER ARTERY, PERCUTANEOUS APPROACH: ICD-10-PCS | Performed by: INTERNAL MEDICINE

## 2020-01-01 PROCEDURE — 85730 THROMBOPLASTIN TIME PARTIAL: CPT

## 2020-01-01 PROCEDURE — 86923 COMPATIBILITY TEST ELECTRIC: CPT

## 2020-01-01 PROCEDURE — 31500 INSERT EMERGENCY AIRWAY: CPT

## 2020-01-01 PROCEDURE — 77030018798 HC PMP KT ENTRL FED COVD -A

## 2020-01-01 PROCEDURE — 76450000000

## 2020-01-01 PROCEDURE — 76010000379 HC BRONCHOSCOPY DIAG/THERAPEUTIC

## 2020-01-01 PROCEDURE — 84300 ASSAY OF URINE SODIUM: CPT

## 2020-01-01 PROCEDURE — 80053 COMPREHEN METABOLIC PANEL: CPT

## 2020-01-01 PROCEDURE — 77010033678 HC OXYGEN DAILY: Performed by: FAMILY MEDICINE

## 2020-01-01 PROCEDURE — 93306 TTE W/DOPPLER COMPLETE: CPT

## 2020-01-01 PROCEDURE — 94761 N-INVAS EAR/PLS OXIMETRY MLT: CPT

## 2020-01-01 RX ORDER — LORAZEPAM 2 MG/ML
2 INJECTION INTRAMUSCULAR EVERY 6 HOURS
Status: DISCONTINUED | OUTPATIENT
Start: 2020-01-01 | End: 2020-01-01

## 2020-01-01 RX ORDER — GLUCOSAM/CHONDRO/HERB 149/HYAL 750-100 MG
1 TABLET ORAL DAILY
Status: DISCONTINUED | OUTPATIENT
Start: 2020-01-01 | End: 2020-01-01

## 2020-01-01 RX ORDER — NOREPINEPHRINE BITARTRATE/D5W 8 MG/250ML
.5-3 PLASTIC BAG, INJECTION (ML) INTRAVENOUS
Status: DISCONTINUED | OUTPATIENT
Start: 2020-01-01 | End: 2020-01-01

## 2020-01-01 RX ORDER — LORAZEPAM 2 MG/ML
2 INJECTION INTRAMUSCULAR
Status: DISCONTINUED | OUTPATIENT
Start: 2020-01-01 | End: 2020-01-01

## 2020-01-01 RX ORDER — ACETAMINOPHEN 325 MG/1
650 TABLET ORAL
Status: DISCONTINUED | OUTPATIENT
Start: 2020-01-01 | End: 2020-01-29 | Stop reason: HOSPADM

## 2020-01-01 RX ORDER — DILTIAZEM HYDROCHLORIDE 120 MG/1
120 CAPSULE, COATED, EXTENDED RELEASE ORAL EVERY 12 HOURS
Status: DISCONTINUED | OUTPATIENT
Start: 2020-01-01 | End: 2020-01-01

## 2020-01-01 RX ORDER — ASPIRIN 325 MG
975 TABLET ORAL 3 TIMES DAILY
Status: DISCONTINUED | OUTPATIENT
Start: 2020-01-01 | End: 2020-01-01

## 2020-01-01 RX ORDER — CODEINE PHOSPHATE AND GUAIFENESIN 10; 100 MG/5ML; MG/5ML
5 SOLUTION ORAL
Status: DISCONTINUED | OUTPATIENT
Start: 2020-01-01 | End: 2020-01-01

## 2020-01-01 RX ORDER — METOPROLOL TARTRATE 25 MG/1
25 TABLET, FILM COATED ORAL EVERY 12 HOURS
Status: DISCONTINUED | OUTPATIENT
Start: 2020-01-01 | End: 2020-01-01

## 2020-01-01 RX ORDER — FUROSEMIDE 10 MG/ML
INJECTION INTRAMUSCULAR; INTRAVENOUS
Status: COMPLETED
Start: 2020-01-01 | End: 2020-01-01

## 2020-01-01 RX ORDER — ALBUTEROL SULFATE 1.25 MG/3ML
2.5 SOLUTION RESPIRATORY (INHALATION)
Status: DISCONTINUED | OUTPATIENT
Start: 2020-01-01 | End: 2020-01-01

## 2020-01-01 RX ORDER — CYANOCOBALAMIN (VITAMIN B-12) 500 MCG
1000 TABLET ORAL DAILY
Status: DISCONTINUED | OUTPATIENT
Start: 2020-01-01 | End: 2020-01-01

## 2020-01-01 RX ORDER — METOPROLOL TARTRATE 5 MG/5ML
INJECTION INTRAVENOUS
Status: COMPLETED
Start: 2020-01-01 | End: 2020-01-01

## 2020-01-01 RX ORDER — LOSARTAN POTASSIUM 50 MG/1
100 TABLET ORAL DAILY
Status: DISCONTINUED | OUTPATIENT
Start: 2020-01-01 | End: 2020-01-01

## 2020-01-01 RX ORDER — IPRATROPIUM BROMIDE 0.5 MG/2.5ML
0.5 SOLUTION RESPIRATORY (INHALATION)
Status: DISCONTINUED | OUTPATIENT
Start: 2020-01-01 | End: 2020-01-01

## 2020-01-01 RX ORDER — VANCOMYCIN HYDROCHLORIDE
1250 ONCE
Status: COMPLETED | OUTPATIENT
Start: 2020-01-01 | End: 2020-01-01

## 2020-01-01 RX ORDER — MELATONIN
1000 DAILY
Status: DISCONTINUED | OUTPATIENT
Start: 2020-01-01 | End: 2020-01-01

## 2020-01-01 RX ORDER — FENTANYL CITRATE 50 UG/ML
25-100 INJECTION, SOLUTION INTRAMUSCULAR; INTRAVENOUS
Status: DISCONTINUED | OUTPATIENT
Start: 2020-01-01 | End: 2020-01-01

## 2020-01-01 RX ORDER — INSULIN LISPRO 100 [IU]/ML
INJECTION, SOLUTION INTRAVENOUS; SUBCUTANEOUS EVERY 6 HOURS
Status: DISCONTINUED | OUTPATIENT
Start: 2020-01-01 | End: 2020-01-01

## 2020-01-01 RX ORDER — HYDROCORTISONE SODIUM SUCCINATE 100 MG/2ML
50 INJECTION, POWDER, FOR SOLUTION INTRAMUSCULAR; INTRAVENOUS EVERY 12 HOURS
Status: DISCONTINUED | OUTPATIENT
Start: 2020-01-01 | End: 2020-01-01

## 2020-01-01 RX ORDER — ALBUMIN HUMAN 250 G/1000ML
SOLUTION INTRAVENOUS
Status: DISPENSED
Start: 2020-01-01 | End: 2020-01-01

## 2020-01-01 RX ORDER — BENZONATATE 100 MG/1
100 CAPSULE ORAL
Status: DISCONTINUED | OUTPATIENT
Start: 2020-01-01 | End: 2020-01-01

## 2020-01-01 RX ORDER — ALBUTEROL SULFATE 0.83 MG/ML
2.5 SOLUTION RESPIRATORY (INHALATION)
Status: DISCONTINUED | OUTPATIENT
Start: 2020-01-01 | End: 2020-01-01

## 2020-01-01 RX ORDER — ALBUTEROL SULFATE 1.25 MG/3ML
2.5 SOLUTION RESPIRATORY (INHALATION)
Status: COMPLETED | OUTPATIENT
Start: 2020-01-01 | End: 2020-01-01

## 2020-01-01 RX ORDER — INSULIN GLARGINE 100 [IU]/ML
10 INJECTION, SOLUTION SUBCUTANEOUS DAILY
Status: DISCONTINUED | OUTPATIENT
Start: 2020-01-01 | End: 2020-01-01

## 2020-01-01 RX ORDER — ALBUTEROL SULFATE 90 UG/1
2 AEROSOL, METERED RESPIRATORY (INHALATION)
COMMUNITY

## 2020-01-01 RX ORDER — MORPHINE SULFATE 2 MG/ML
5 INJECTION, SOLUTION INTRAMUSCULAR; INTRAVENOUS ONCE
Status: COMPLETED | OUTPATIENT
Start: 2020-01-01 | End: 2020-01-01

## 2020-01-01 RX ORDER — SODIUM CHLORIDE 9 MG/ML
250 INJECTION, SOLUTION INTRAVENOUS AS NEEDED
Status: DISCONTINUED | OUTPATIENT
Start: 2020-01-01 | End: 2020-01-01

## 2020-01-01 RX ORDER — MORPHINE SULFATE 2 MG/ML
2 INJECTION, SOLUTION INTRAMUSCULAR; INTRAVENOUS
Status: DISCONTINUED | OUTPATIENT
Start: 2020-01-01 | End: 2020-01-29 | Stop reason: HOSPADM

## 2020-01-01 RX ORDER — IPRATROPIUM BROMIDE AND ALBUTEROL SULFATE 2.5; .5 MG/3ML; MG/3ML
3 SOLUTION RESPIRATORY (INHALATION)
Status: DISCONTINUED | OUTPATIENT
Start: 2020-01-01 | End: 2020-01-01

## 2020-01-01 RX ORDER — CHLORHEXIDINE GLUCONATE 1.2 MG/ML
10 RINSE ORAL EVERY 12 HOURS
Status: DISCONTINUED | OUTPATIENT
Start: 2020-01-01 | End: 2020-01-29 | Stop reason: HOSPADM

## 2020-01-01 RX ORDER — DILTIAZEM HYDROCHLORIDE 30 MG/1
45 TABLET, FILM COATED ORAL EVERY 6 HOURS
Status: DISCONTINUED | OUTPATIENT
Start: 2020-01-01 | End: 2020-01-01

## 2020-01-01 RX ORDER — CARBAMAZEPINE 100 MG/1
100 TABLET, EXTENDED RELEASE ORAL EVERY 12 HOURS
Status: DISCONTINUED | OUTPATIENT
Start: 2020-01-01 | End: 2020-01-01

## 2020-01-01 RX ORDER — HEPARIN SODIUM 5000 [USP'U]/ML
5000 INJECTION, SOLUTION INTRAVENOUS; SUBCUTANEOUS EVERY 8 HOURS
Status: DISCONTINUED | OUTPATIENT
Start: 2020-01-01 | End: 2020-01-01

## 2020-01-01 RX ORDER — ATORVASTATIN CALCIUM 10 MG/1
10 TABLET, FILM COATED ORAL DAILY
COMMUNITY

## 2020-01-01 RX ORDER — VANCOMYCIN/0.9 % SOD CHLORIDE 750 MG/250
750 PLASTIC BAG, INJECTION (ML) INTRAVENOUS EVERY 24 HOURS
Status: DISCONTINUED | OUTPATIENT
Start: 2020-01-01 | End: 2020-01-01

## 2020-01-01 RX ORDER — ALBUTEROL SULFATE 0.83 MG/ML
2.5 SOLUTION RESPIRATORY (INHALATION)
Status: COMPLETED | OUTPATIENT
Start: 2020-01-01 | End: 2020-01-01

## 2020-01-01 RX ORDER — MORPHINE SULFATE 5 MG/ML
INJECTION, SOLUTION INTRAVENOUS CONTINUOUS
Status: DISCONTINUED | OUTPATIENT
Start: 2020-01-01 | End: 2020-01-29 | Stop reason: HOSPADM

## 2020-01-01 RX ORDER — NOREPINEPHRINE BITARTRATE/D5W 8 MG/250ML
0-16 PLASTIC BAG, INJECTION (ML) INTRAVENOUS
Status: DISPENSED | OUTPATIENT
Start: 2020-01-01 | End: 2020-01-01

## 2020-01-01 RX ORDER — GLYCOPYRROLATE 0.2 MG/ML
0.2 INJECTION INTRAMUSCULAR; INTRAVENOUS
Status: DISCONTINUED | OUTPATIENT
Start: 2020-01-01 | End: 2020-01-29 | Stop reason: HOSPADM

## 2020-01-01 RX ORDER — FUROSEMIDE 10 MG/ML
40 INJECTION INTRAMUSCULAR; INTRAVENOUS EVERY 6 HOURS
Status: DISCONTINUED | OUTPATIENT
Start: 2020-01-01 | End: 2020-01-01

## 2020-01-01 RX ORDER — METOPROLOL TARTRATE 5 MG/5ML
5 INJECTION INTRAVENOUS ONCE
Status: COMPLETED | OUTPATIENT
Start: 2020-01-01 | End: 2020-01-01

## 2020-01-01 RX ORDER — ENOXAPARIN SODIUM 100 MG/ML
40 INJECTION SUBCUTANEOUS EVERY 24 HOURS
Status: DISCONTINUED | OUTPATIENT
Start: 2020-01-01 | End: 2020-01-01

## 2020-01-01 RX ORDER — ASCORBIC ACID 250 MG
500 TABLET ORAL DAILY
Status: DISCONTINUED | OUTPATIENT
Start: 2020-01-01 | End: 2020-01-01

## 2020-01-01 RX ORDER — DILTIAZEM HYDROCHLORIDE 30 MG/1
30 TABLET, FILM COATED ORAL EVERY 6 HOURS
Status: DISCONTINUED | OUTPATIENT
Start: 2020-01-01 | End: 2020-01-01

## 2020-01-01 RX ORDER — ALBUMIN HUMAN 250 G/1000ML
25 SOLUTION INTRAVENOUS EVERY 6 HOURS
Status: DISPENSED | OUTPATIENT
Start: 2020-01-01 | End: 2020-01-01

## 2020-01-01 RX ORDER — MIDAZOLAM HYDROCHLORIDE 1 MG/ML
1-2 INJECTION, SOLUTION INTRAMUSCULAR; INTRAVENOUS
Status: DISCONTINUED | OUTPATIENT
Start: 2020-01-01 | End: 2020-01-01

## 2020-01-01 RX ORDER — HYDROCORTISONE SODIUM SUCCINATE 100 MG/2ML
100 INJECTION, POWDER, FOR SOLUTION INTRAMUSCULAR; INTRAVENOUS EVERY 8 HOURS
Status: DISCONTINUED | OUTPATIENT
Start: 2020-01-01 | End: 2020-01-01

## 2020-01-01 RX ORDER — LORAZEPAM 2 MG/ML
1 INJECTION INTRAMUSCULAR
Status: DISCONTINUED | OUTPATIENT
Start: 2020-01-01 | End: 2020-01-01

## 2020-01-01 RX ORDER — SODIUM CHLORIDE 0.9 % (FLUSH) 0.9 %
5-10 SYRINGE (ML) INJECTION AS NEEDED
Status: DISCONTINUED | OUTPATIENT
Start: 2020-01-01 | End: 2020-01-29 | Stop reason: HOSPADM

## 2020-01-01 RX ORDER — LORAZEPAM 2 MG/ML
2 INJECTION INTRAMUSCULAR EVERY 6 HOURS
Status: DISCONTINUED | OUTPATIENT
Start: 2020-01-01 | End: 2020-01-29 | Stop reason: HOSPADM

## 2020-01-01 RX ORDER — CODEINE PHOSPHATE AND GUAIFENESIN 10; 100 MG/5ML; MG/5ML
10 SOLUTION ORAL
Status: DISCONTINUED | OUTPATIENT
Start: 2020-01-01 | End: 2020-01-01

## 2020-01-01 RX ORDER — VITAMIN E 268 MG
400 CAPSULE ORAL DAILY
Status: DISCONTINUED | OUTPATIENT
Start: 2020-01-01 | End: 2020-01-01 | Stop reason: CLARIF

## 2020-01-01 RX ORDER — IPRATROPIUM BROMIDE AND ALBUTEROL SULFATE 2.5; .5 MG/3ML; MG/3ML
3 SOLUTION RESPIRATORY (INHALATION)
Status: DISCONTINUED | OUTPATIENT
Start: 2020-01-01 | End: 2020-01-29 | Stop reason: HOSPADM

## 2020-01-01 RX ORDER — ENOXAPARIN SODIUM 100 MG/ML
1 INJECTION SUBCUTANEOUS
Status: COMPLETED | OUTPATIENT
Start: 2020-01-01 | End: 2020-01-01

## 2020-01-01 RX ORDER — GUAIFENESIN 100 MG/5ML
400 SOLUTION ORAL
Status: DISCONTINUED | OUTPATIENT
Start: 2020-01-01 | End: 2020-01-01

## 2020-01-01 RX ORDER — AMLODIPINE BESYLATE 5 MG/1
5 TABLET ORAL DAILY
Status: DISCONTINUED | OUTPATIENT
Start: 2020-01-01 | End: 2020-01-01

## 2020-01-01 RX ORDER — FUROSEMIDE 10 MG/ML
20 INJECTION INTRAMUSCULAR; INTRAVENOUS EVERY 6 HOURS
Status: DISCONTINUED | OUTPATIENT
Start: 2020-01-01 | End: 2020-01-01

## 2020-01-01 RX ORDER — LORAZEPAM 0.5 MG/1
0.5 TABLET ORAL 3 TIMES DAILY
Status: DISCONTINUED | OUTPATIENT
Start: 2020-01-01 | End: 2020-01-01

## 2020-01-01 RX ORDER — BENZONATATE 100 MG/1
200 CAPSULE ORAL
Status: COMPLETED | OUTPATIENT
Start: 2020-01-01 | End: 2020-01-01

## 2020-01-01 RX ORDER — HYDROCORTISONE SODIUM SUCCINATE 100 MG/2ML
50 INJECTION, POWDER, FOR SOLUTION INTRAMUSCULAR; INTRAVENOUS EVERY 8 HOURS
Status: DISCONTINUED | OUTPATIENT
Start: 2020-01-01 | End: 2020-01-01

## 2020-01-01 RX ORDER — NOREPINEPHRINE BITARTRATE/D5W 8 MG/250ML
PLASTIC BAG, INJECTION (ML) INTRAVENOUS
Status: DISCONTINUED
Start: 2020-01-01 | End: 2020-01-01

## 2020-01-01 RX ORDER — DILTIAZEM HYDROCHLORIDE 5 MG/ML
5 INJECTION INTRAVENOUS ONCE
Status: COMPLETED | OUTPATIENT
Start: 2020-01-01 | End: 2020-01-01

## 2020-01-01 RX ORDER — SODIUM CHLORIDE 9 MG/ML
50 INJECTION, SOLUTION INTRAVENOUS CONTINUOUS
Status: DISCONTINUED | OUTPATIENT
Start: 2020-01-01 | End: 2020-01-01

## 2020-01-01 RX ORDER — LORAZEPAM 2 MG/ML
0.5 INJECTION INTRAMUSCULAR
Status: DISCONTINUED | OUTPATIENT
Start: 2020-01-01 | End: 2020-01-01

## 2020-01-01 RX ORDER — CARBAMAZEPINE 100 MG/5ML
100 SUSPENSION ORAL EVERY 12 HOURS
Status: DISCONTINUED | OUTPATIENT
Start: 2020-01-01 | End: 2020-01-29 | Stop reason: HOSPADM

## 2020-01-01 RX ORDER — GUAIFENESIN 100 MG/5ML
400 SOLUTION ORAL EVERY 4 HOURS
Status: DISCONTINUED | OUTPATIENT
Start: 2020-01-01 | End: 2020-01-01

## 2020-01-01 RX ORDER — ALBUMIN HUMAN 50 G/1000ML
25 SOLUTION INTRAVENOUS EVERY 6 HOURS
Status: COMPLETED | OUTPATIENT
Start: 2020-01-01 | End: 2020-01-01

## 2020-01-01 RX ORDER — SODIUM CHLORIDE FOR INHALATION 3 %
4 VIAL, NEBULIZER (ML) INHALATION
Status: DISCONTINUED | OUTPATIENT
Start: 2020-01-01 | End: 2020-01-01

## 2020-01-01 RX ORDER — FUROSEMIDE 10 MG/ML
40 INJECTION INTRAMUSCULAR; INTRAVENOUS ONCE
Status: COMPLETED | OUTPATIENT
Start: 2020-01-01 | End: 2020-01-01

## 2020-01-01 RX ORDER — LANOLIN ALCOHOL/MO/W.PET/CERES
400 CREAM (GRAM) TOPICAL DAILY
Status: DISCONTINUED | OUTPATIENT
Start: 2020-01-01 | End: 2020-01-01

## 2020-01-01 RX ORDER — MONTELUKAST SODIUM 10 MG/1
10 TABLET ORAL AS NEEDED
Status: DISCONTINUED | OUTPATIENT
Start: 2020-01-01 | End: 2020-01-01

## 2020-01-01 RX ORDER — LORAZEPAM 2 MG/ML
1 INJECTION INTRAMUSCULAR
Status: DISCONTINUED | OUTPATIENT
Start: 2020-01-01 | End: 2020-01-29 | Stop reason: HOSPADM

## 2020-01-01 RX ORDER — ENOXAPARIN SODIUM 100 MG/ML
60 INJECTION SUBCUTANEOUS EVERY 12 HOURS
Status: DISCONTINUED | OUTPATIENT
Start: 2020-01-01 | End: 2020-01-01

## 2020-01-01 RX ORDER — ATORVASTATIN CALCIUM 10 MG/1
10 TABLET, FILM COATED ORAL DAILY
Status: DISCONTINUED | OUTPATIENT
Start: 2020-01-01 | End: 2020-01-01

## 2020-01-01 RX ORDER — SCOLOPAMINE TRANSDERMAL SYSTEM 1 MG/1
1 PATCH, EXTENDED RELEASE TRANSDERMAL
Status: DISCONTINUED | OUTPATIENT
Start: 2020-01-01 | End: 2020-01-29 | Stop reason: HOSPADM

## 2020-01-01 RX ORDER — LORAZEPAM 2 MG/ML
2 INJECTION INTRAMUSCULAR ONCE
Status: COMPLETED | OUTPATIENT
Start: 2020-01-01 | End: 2020-01-01

## 2020-01-01 RX ORDER — DEXTROSE 50 % IN WATER (D50W) INTRAVENOUS SYRINGE
25-50 AS NEEDED
Status: DISCONTINUED | OUTPATIENT
Start: 2020-01-01 | End: 2020-01-29 | Stop reason: HOSPADM

## 2020-01-01 RX ORDER — ALBUTEROL SULFATE 90 UG/1
2 AEROSOL, METERED RESPIRATORY (INHALATION)
Status: DISCONTINUED | OUTPATIENT
Start: 2020-01-01 | End: 2020-01-01

## 2020-01-01 RX ORDER — DILTIAZEM HYDROCHLORIDE 5 MG/ML
10 INJECTION INTRAVENOUS ONCE
Status: COMPLETED | OUTPATIENT
Start: 2020-01-01 | End: 2020-01-01

## 2020-01-01 RX ORDER — CODEINE PHOSPHATE AND GUAIFENESIN 10; 100 MG/5ML; MG/5ML
5 SOLUTION ORAL EVERY 4 HOURS
Status: DISCONTINUED | OUTPATIENT
Start: 2020-01-01 | End: 2020-01-01

## 2020-01-01 RX ORDER — HYDROCORTISONE SODIUM SUCCINATE 100 MG/2ML
25 INJECTION, POWDER, FOR SOLUTION INTRAMUSCULAR; INTRAVENOUS EVERY 12 HOURS
Status: COMPLETED | OUTPATIENT
Start: 2020-01-01 | End: 2020-01-01

## 2020-01-01 RX ORDER — METOPROLOL TARTRATE 25 MG/1
12.5 TABLET, FILM COATED ORAL EVERY 12 HOURS
Status: DISCONTINUED | OUTPATIENT
Start: 2020-01-01 | End: 2020-01-01

## 2020-01-01 RX ORDER — MAGNESIUM SULFATE 100 %
4 CRYSTALS MISCELLANEOUS AS NEEDED
Status: DISCONTINUED | OUTPATIENT
Start: 2020-01-01 | End: 2020-01-01

## 2020-01-01 RX ORDER — FUROSEMIDE 10 MG/ML
60 INJECTION INTRAMUSCULAR; INTRAVENOUS ONCE
Status: COMPLETED | OUTPATIENT
Start: 2020-01-01 | End: 2020-01-01

## 2020-01-01 RX ADMIN — PIPERACILLIN AND TAZOBACTAM 3.38 G: 3; .375 INJECTION, POWDER, LYOPHILIZED, FOR SOLUTION INTRAVENOUS at 05:27

## 2020-01-01 RX ADMIN — SODIUM CHLORIDE SOLN NEBU 3% 4 ML: 3 NEBU SOLN at 08:26

## 2020-01-01 RX ADMIN — ALBUTEROL SULFATE 1.25 MG: 1.25 SOLUTION RESPIRATORY (INHALATION) at 19:43

## 2020-01-01 RX ADMIN — Medication 2 MCG/MIN: at 17:00

## 2020-01-01 RX ADMIN — CHLORHEXIDINE GLUCONATE 0.12% ORAL RINSE 10 ML: 1.2 LIQUID ORAL at 09:55

## 2020-01-01 RX ADMIN — Medication 10 ML: at 13:45

## 2020-01-01 RX ADMIN — ATORVASTATIN CALCIUM 10 MG: 10 TABLET, FILM COATED ORAL at 08:02

## 2020-01-01 RX ADMIN — Medication 500 MG: at 08:02

## 2020-01-01 RX ADMIN — MIDAZOLAM HYDROCHLORIDE 2 MG: 2 INJECTION, SOLUTION INTRAMUSCULAR; INTRAVENOUS at 21:46

## 2020-01-01 RX ADMIN — Medication 6 MCG/MIN: at 12:30

## 2020-01-01 RX ADMIN — SODIUM CHLORIDE 250 ML: 900 INJECTION, SOLUTION INTRAVENOUS at 06:17

## 2020-01-01 RX ADMIN — ALBUMIN (HUMAN) 25 G: 12.5 INJECTION, SOLUTION INTRAVENOUS at 23:14

## 2020-01-01 RX ADMIN — AMIODARONE HYDROCHLORIDE 0.5 MG/MIN: 1.8 INJECTION, SOLUTION INTRAVENOUS at 17:30

## 2020-01-01 RX ADMIN — HYDROCORTISONE SODIUM SUCCINATE 50 MG: 100 INJECTION, POWDER, FOR SOLUTION INTRAMUSCULAR; INTRAVENOUS at 22:00

## 2020-01-01 RX ADMIN — CHLORHEXIDINE GLUCONATE 0.12% ORAL RINSE 10 ML: 1.2 LIQUID ORAL at 22:30

## 2020-01-01 RX ADMIN — GUAIFENESIN AND CODEINE PHOSPHATE 10 ML: 100; 10 SOLUTION ORAL at 20:18

## 2020-01-01 RX ADMIN — INSULIN LISPRO 6 UNITS: 100 INJECTION, SOLUTION INTRAVENOUS; SUBCUTANEOUS at 05:56

## 2020-01-01 RX ADMIN — CHLORHEXIDINE GLUCONATE 0.12% ORAL RINSE 10 ML: 1.2 LIQUID ORAL at 22:00

## 2020-01-01 RX ADMIN — Medication 50 MCG/HR: at 16:54

## 2020-01-01 RX ADMIN — ALBUTEROL SULFATE 2.5 MG: 2.5 SOLUTION RESPIRATORY (INHALATION) at 15:32

## 2020-01-01 RX ADMIN — ALBUTEROL SULFATE 2.5 MG: 2.5 SOLUTION RESPIRATORY (INHALATION) at 16:23

## 2020-01-01 RX ADMIN — DILTIAZEM HYDROCHLORIDE 10 MG: 5 INJECTION INTRAVENOUS at 14:19

## 2020-01-01 RX ADMIN — Medication 500 MG: at 09:00

## 2020-01-01 RX ADMIN — INSULIN GLARGINE 10 UNITS: 100 INJECTION, SOLUTION SUBCUTANEOUS at 09:57

## 2020-01-01 RX ADMIN — ALBUMIN (HUMAN) 25 G: 12.5 INJECTION, SOLUTION INTRAVENOUS at 06:06

## 2020-01-01 RX ADMIN — PIPERACILLIN AND TAZOBACTAM 2.25 G: 2; .25 INJECTION, POWDER, FOR SOLUTION INTRAVENOUS at 04:50

## 2020-01-01 RX ADMIN — ENOXAPARIN SODIUM 60 MG: 60 INJECTION SUBCUTANEOUS at 08:13

## 2020-01-01 RX ADMIN — PIPERACILLIN AND TAZOBACTAM 2.25 G: 2; .25 INJECTION, POWDER, FOR SOLUTION INTRAVENOUS at 17:20

## 2020-01-01 RX ADMIN — Medication 150 MCG/HR: at 18:31

## 2020-01-01 RX ADMIN — VASOPRESSIN 0.04 UNITS/MIN: 20 INJECTION INTRAVENOUS at 16:30

## 2020-01-01 RX ADMIN — SODIUM CHLORIDE 10 MG/HR: 900 INJECTION, SOLUTION INTRAVENOUS at 08:34

## 2020-01-01 RX ADMIN — IPRATROPIUM BROMIDE 0.5 MG: 0.5 SOLUTION RESPIRATORY (INHALATION) at 00:03

## 2020-01-01 RX ADMIN — WATER 2 G: 1 INJECTION INTRAMUSCULAR; INTRAVENOUS; SUBCUTANEOUS at 16:55

## 2020-01-01 RX ADMIN — WATER 2 G: 1 INJECTION INTRAMUSCULAR; INTRAVENOUS; SUBCUTANEOUS at 17:13

## 2020-01-01 RX ADMIN — INSULIN LISPRO 3 UNITS: 100 INJECTION, SOLUTION INTRAVENOUS; SUBCUTANEOUS at 18:50

## 2020-01-01 RX ADMIN — CARBAMAZEPINE 100 MG: 100 TABLET, EXTENDED RELEASE ORAL at 08:02

## 2020-01-01 RX ADMIN — SODIUM CHLORIDE 15 MG/HR: 900 INJECTION, SOLUTION INTRAVENOUS at 05:00

## 2020-01-01 RX ADMIN — PIPERACILLIN AND TAZOBACTAM 2.25 G: 2; .25 INJECTION, POWDER, FOR SOLUTION INTRAVENOUS at 05:18

## 2020-01-01 RX ADMIN — FUROSEMIDE 40 MG: 10 INJECTION, SOLUTION INTRAMUSCULAR; INTRAVENOUS at 23:25

## 2020-01-01 RX ADMIN — METOPROLOL TARTRATE 5 MG: 5 INJECTION INTRAVENOUS at 10:54

## 2020-01-01 RX ADMIN — Medication 2.5 TABLET: at 09:00

## 2020-01-01 RX ADMIN — MULTIPLE VITAMINS W/ MINERALS TAB 1 TABLET: TAB at 08:02

## 2020-01-01 RX ADMIN — CHLOROTHIAZIDE SODIUM 250 MG: 500 INJECTION, POWDER, LYOPHILIZED, FOR SOLUTION INTRAVENOUS at 12:40

## 2020-01-01 RX ADMIN — MULTIPLE VITAMIN LIQUID 30 ML: LIQUID at 09:34

## 2020-01-01 RX ADMIN — CARBAMAZEPINE 100 MG: 100 TABLET, EXTENDED RELEASE ORAL at 20:15

## 2020-01-01 RX ADMIN — PIPERACILLIN AND TAZOBACTAM 2.25 G: 2; .25 INJECTION, POWDER, FOR SOLUTION INTRAVENOUS at 10:42

## 2020-01-01 RX ADMIN — GUAIFENESIN 400 MG: 200 SOLUTION ORAL at 21:34

## 2020-01-01 RX ADMIN — CHLORHEXIDINE GLUCONATE 0.12% ORAL RINSE 10 ML: 1.2 LIQUID ORAL at 09:00

## 2020-01-01 RX ADMIN — ALBUTEROL SULFATE 1.25 MG: 1.25 SOLUTION RESPIRATORY (INHALATION) at 20:46

## 2020-01-01 RX ADMIN — Medication 50 MCG/HR: at 03:16

## 2020-01-01 RX ADMIN — CARBAMAZEPINE 100 MG: 100 TABLET, EXTENDED RELEASE ORAL at 08:19

## 2020-01-01 RX ADMIN — HYDROCORTISONE SODIUM SUCCINATE 25 MG: 100 INJECTION, POWDER, FOR SOLUTION INTRAMUSCULAR; INTRAVENOUS at 20:08

## 2020-01-01 RX ADMIN — ATORVASTATIN CALCIUM 10 MG: 10 TABLET, FILM COATED ORAL at 09:06

## 2020-01-01 RX ADMIN — ALBUTEROL SULFATE 2.5 MG: 2.5 SOLUTION RESPIRATORY (INHALATION) at 11:27

## 2020-01-01 RX ADMIN — AZITHROMYCIN MONOHYDRATE 500 MG: 500 INJECTION, POWDER, LYOPHILIZED, FOR SOLUTION INTRAVENOUS at 14:19

## 2020-01-01 RX ADMIN — METOPROLOL TARTRATE 25 MG: 25 TABLET ORAL at 21:40

## 2020-01-01 RX ADMIN — PIPERACILLIN AND TAZOBACTAM 3.38 G: 3; .375 INJECTION, POWDER, LYOPHILIZED, FOR SOLUTION INTRAVENOUS at 12:00

## 2020-01-01 RX ADMIN — MONTELUKAST 10 MG: 10 TABLET, FILM COATED ORAL at 10:12

## 2020-01-01 RX ADMIN — CHLORHEXIDINE GLUCONATE 0.12% ORAL RINSE 10 ML: 1.2 LIQUID ORAL at 08:29

## 2020-01-01 RX ADMIN — AZITHROMYCIN MONOHYDRATE 500 MG: 500 INJECTION, POWDER, LYOPHILIZED, FOR SOLUTION INTRAVENOUS at 15:01

## 2020-01-01 RX ADMIN — HYDROCORTISONE SODIUM SUCCINATE 100 MG: 100 INJECTION, POWDER, FOR SOLUTION INTRAMUSCULAR; INTRAVENOUS at 06:12

## 2020-01-01 RX ADMIN — PIPERACILLIN AND TAZOBACTAM 2.25 G: 2; .25 INJECTION, POWDER, FOR SOLUTION INTRAVENOUS at 22:14

## 2020-01-01 RX ADMIN — AMIODARONE HYDROCHLORIDE 1 MG/MIN: 1.8 INJECTION, SOLUTION INTRAVENOUS at 06:06

## 2020-01-01 RX ADMIN — Medication 500 MG: at 09:58

## 2020-01-01 RX ADMIN — CARBAMAZEPINE 100 MG: 100 TABLET, EXTENDED RELEASE ORAL at 20:08

## 2020-01-01 RX ADMIN — MULTIPLE VITAMINS W/ MINERALS TAB 1 TABLET: TAB at 09:30

## 2020-01-01 RX ADMIN — MULTIPLE VITAMIN LIQUID 30 ML: LIQUID at 09:56

## 2020-01-01 RX ADMIN — INSULIN LISPRO 3 UNITS: 100 INJECTION, SOLUTION INTRAVENOUS; SUBCUTANEOUS at 12:56

## 2020-01-01 RX ADMIN — ALBUTEROL SULFATE 2.5 MG: 2.5 SOLUTION RESPIRATORY (INHALATION) at 12:33

## 2020-01-01 RX ADMIN — Medication 100 MCG/HR: at 08:25

## 2020-01-01 RX ADMIN — VITAMIN E CAP 400 UNIT 400 UNITS: 400 CAP at 08:59

## 2020-01-01 RX ADMIN — SODIUM CHLORIDE 15 MG/HR: 900 INJECTION, SOLUTION INTRAVENOUS at 19:09

## 2020-01-01 RX ADMIN — ENOXAPARIN SODIUM 60 MG: 60 INJECTION SUBCUTANEOUS at 20:58

## 2020-01-01 RX ADMIN — AMIODARONE HYDROCHLORIDE 1 MG/MIN: 1.8 INJECTION, SOLUTION INTRAVENOUS at 00:57

## 2020-01-01 RX ADMIN — ALBUTEROL SULFATE 2.5 MG: 2.5 SOLUTION RESPIRATORY (INHALATION) at 08:26

## 2020-01-01 RX ADMIN — CARBAMAZEPINE 100 MG: 100 TABLET, EXTENDED RELEASE ORAL at 10:11

## 2020-01-01 RX ADMIN — PIPERACILLIN AND TAZOBACTAM 2.25 G: 2; .25 INJECTION, POWDER, FOR SOLUTION INTRAVENOUS at 11:32

## 2020-01-01 RX ADMIN — SODIUM CHLORIDE 40 MG: 9 INJECTION, SOLUTION INTRAMUSCULAR; INTRAVENOUS; SUBCUTANEOUS at 09:30

## 2020-01-01 RX ADMIN — HEPARIN SODIUM 5000 UNITS: 5000 INJECTION INTRAVENOUS; SUBCUTANEOUS at 08:58

## 2020-01-01 RX ADMIN — ALBUTEROL SULFATE 2.5 MG: 2.5 SOLUTION RESPIRATORY (INHALATION) at 12:14

## 2020-01-01 RX ADMIN — FUROSEMIDE 40 MG: 10 INJECTION, SOLUTION INTRAMUSCULAR; INTRAVENOUS at 05:39

## 2020-01-01 RX ADMIN — GUAIFENESIN 400 MG: 200 SOLUTION ORAL at 10:12

## 2020-01-01 RX ADMIN — SODIUM CHLORIDE SOLN NEBU 3% 4 ML: 3 NEBU SOLN at 15:11

## 2020-01-01 RX ADMIN — SODIUM CHLORIDE SOLN NEBU 3% 4 ML: 3 NEBU SOLN at 11:28

## 2020-01-01 RX ADMIN — ALBUMIN (HUMAN) 25 G: 12.5 INJECTION, SOLUTION INTRAVENOUS at 17:46

## 2020-01-01 RX ADMIN — MULTIPLE VITAMIN LIQUID 30 ML: LIQUID at 12:00

## 2020-01-01 RX ADMIN — ALBUTEROL SULFATE 2.5 MG: 2.5 SOLUTION RESPIRATORY (INHALATION) at 19:45

## 2020-01-01 RX ADMIN — FAMOTIDINE 20 MG: 10 INJECTION, SOLUTION INTRAVENOUS at 10:41

## 2020-01-01 RX ADMIN — GUAIFENESIN AND CODEINE PHOSPHATE 10 ML: 100; 10 SOLUTION ORAL at 09:00

## 2020-01-01 RX ADMIN — IPRATROPIUM BROMIDE 0.5 MG: 0.5 SOLUTION RESPIRATORY (INHALATION) at 12:33

## 2020-01-01 RX ADMIN — MORPHINE SULFATE 2 MG: 2 INJECTION, SOLUTION INTRAMUSCULAR; INTRAVENOUS at 12:09

## 2020-01-01 RX ADMIN — ATORVASTATIN CALCIUM 10 MG: 10 TABLET, FILM COATED ORAL at 09:58

## 2020-01-01 RX ADMIN — ALBUTEROL SULFATE 2.5 MG: 2.5 SOLUTION RESPIRATORY (INHALATION) at 20:54

## 2020-01-01 RX ADMIN — IPRATROPIUM BROMIDE AND ALBUTEROL SULFATE 3 ML: .5; 3 SOLUTION RESPIRATORY (INHALATION) at 15:26

## 2020-01-01 RX ADMIN — MORPHINE SULFATE 2 MG: 2 INJECTION, SOLUTION INTRAMUSCULAR; INTRAVENOUS at 11:50

## 2020-01-01 RX ADMIN — PIPERACILLIN AND TAZOBACTAM 2.25 G: 2; .25 INJECTION, POWDER, FOR SOLUTION INTRAVENOUS at 10:01

## 2020-01-01 RX ADMIN — VANCOMYCIN HYDROCHLORIDE 1250 MG: 10 INJECTION, POWDER, LYOPHILIZED, FOR SOLUTION INTRAVENOUS at 00:55

## 2020-01-01 RX ADMIN — CARBAMAZEPINE 100 MG: 100 TABLET, EXTENDED RELEASE ORAL at 21:33

## 2020-01-01 RX ADMIN — METOPROLOL TARTRATE 25 MG: 25 TABLET ORAL at 10:12

## 2020-01-01 RX ADMIN — CHLORHEXIDINE GLUCONATE 0.12% ORAL RINSE 10 ML: 1.2 LIQUID ORAL at 09:08

## 2020-01-01 RX ADMIN — CARBAMAZEPINE 100 MG: 100 TABLET, EXTENDED RELEASE ORAL at 21:40

## 2020-01-01 RX ADMIN — MIDAZOLAM HYDROCHLORIDE 2 MG: 2 INJECTION, SOLUTION INTRAMUSCULAR; INTRAVENOUS at 05:26

## 2020-01-01 RX ADMIN — ENOXAPARIN SODIUM 60 MG: 60 INJECTION SUBCUTANEOUS at 10:13

## 2020-01-01 RX ADMIN — IPRATROPIUM BROMIDE 0.5 MG: 0.5 SOLUTION RESPIRATORY (INHALATION) at 05:12

## 2020-01-01 RX ADMIN — HYDROCORTISONE SODIUM SUCCINATE 50 MG: 100 INJECTION, POWDER, FOR SOLUTION INTRAMUSCULAR; INTRAVENOUS at 21:03

## 2020-01-01 RX ADMIN — MIDAZOLAM HYDROCHLORIDE 2 MG: 2 INJECTION, SOLUTION INTRAMUSCULAR; INTRAVENOUS at 07:54

## 2020-01-01 RX ADMIN — GLYCOPYRROLATE 0.2 MG: 0.2 INJECTION INTRAMUSCULAR; INTRAVENOUS at 16:32

## 2020-01-01 RX ADMIN — MIDAZOLAM HYDROCHLORIDE 3 MG/HR: 5 INJECTION, SOLUTION INTRAMUSCULAR; INTRAVENOUS at 17:08

## 2020-01-01 RX ADMIN — AMIODARONE HYDROCHLORIDE 0.5 MG/MIN: 1.8 INJECTION, SOLUTION INTRAVENOUS at 18:41

## 2020-01-01 RX ADMIN — Medication 2.5 TABLET: at 08:59

## 2020-01-01 RX ADMIN — HYDROCORTISONE SODIUM SUCCINATE 100 MG: 100 INJECTION, POWDER, FOR SOLUTION INTRAMUSCULAR; INTRAVENOUS at 01:47

## 2020-01-01 RX ADMIN — AMIODARONE HYDROCHLORIDE 1 MG/MIN: 1.8 INJECTION, SOLUTION INTRAVENOUS at 23:13

## 2020-01-01 RX ADMIN — SODIUM CHLORIDE SOLN NEBU 3% 4 ML: 3 NEBU SOLN at 16:24

## 2020-01-01 RX ADMIN — WATER 2 G: 1 INJECTION INTRAMUSCULAR; INTRAVENOUS; SUBCUTANEOUS at 15:11

## 2020-01-01 RX ADMIN — ALBUTEROL SULFATE 2.5 MG: 2.5 SOLUTION RESPIRATORY (INHALATION) at 08:51

## 2020-01-01 RX ADMIN — SODIUM CHLORIDE 12.5 MG/HR: 900 INJECTION, SOLUTION INTRAVENOUS at 17:58

## 2020-01-01 RX ADMIN — CHLORHEXIDINE GLUCONATE 0.12% ORAL RINSE 10 ML: 1.2 LIQUID ORAL at 08:00

## 2020-01-01 RX ADMIN — SODIUM CHLORIDE SOLN NEBU 3% 4 ML: 3 NEBU SOLN at 15:33

## 2020-01-01 RX ADMIN — GUAIFENESIN 400 MG: 200 SOLUTION ORAL at 23:34

## 2020-01-01 RX ADMIN — LORAZEPAM 2 MG: 2 INJECTION INTRAMUSCULAR; INTRAVENOUS at 17:05

## 2020-01-01 RX ADMIN — DILTIAZEM HYDROCHLORIDE 120 MG: 120 CAPSULE, COATED, EXTENDED RELEASE ORAL at 21:42

## 2020-01-01 RX ADMIN — ALBUTEROL SULFATE 2.5 MG: 2.5 SOLUTION RESPIRATORY (INHALATION) at 09:08

## 2020-01-01 RX ADMIN — GUAIFENESIN AND CODEINE PHOSPHATE 10 ML: 100; 10 SOLUTION ORAL at 06:10

## 2020-01-01 RX ADMIN — CARBAMAZEPINE 100 MG: 100 TABLET, EXTENDED RELEASE ORAL at 09:59

## 2020-01-01 RX ADMIN — METOPROLOL TARTRATE 12.5 MG: 25 TABLET ORAL at 20:18

## 2020-01-01 RX ADMIN — MIDAZOLAM HYDROCHLORIDE 2 MG: 2 INJECTION, SOLUTION INTRAMUSCULAR; INTRAVENOUS at 03:45

## 2020-01-01 RX ADMIN — MORPHINE SULFATE 2 MG: 2 INJECTION, SOLUTION INTRAMUSCULAR; INTRAVENOUS at 14:30

## 2020-01-01 RX ADMIN — OMEGA-3 FATTY ACIDS CAP 1000 MG 1 CAPSULE: 1000 CAP at 09:56

## 2020-01-01 RX ADMIN — HYDROCORTISONE SODIUM SUCCINATE 50 MG: 100 INJECTION, POWDER, FOR SOLUTION INTRAMUSCULAR; INTRAVENOUS at 09:56

## 2020-01-01 RX ADMIN — HEPARIN SODIUM 5000 UNITS: 5000 INJECTION INTRAVENOUS; SUBCUTANEOUS at 23:06

## 2020-01-01 RX ADMIN — ATORVASTATIN CALCIUM 10 MG: 10 TABLET, FILM COATED ORAL at 09:30

## 2020-01-01 RX ADMIN — IPRATROPIUM BROMIDE 0.5 MG: 0.5 SOLUTION RESPIRATORY (INHALATION) at 20:46

## 2020-01-01 RX ADMIN — ATORVASTATIN CALCIUM 10 MG: 10 TABLET, FILM COATED ORAL at 08:20

## 2020-01-01 RX ADMIN — MULTIPLE VITAMINS W/ MINERALS TAB 1 TABLET: TAB at 08:20

## 2020-01-01 RX ADMIN — ENOXAPARIN SODIUM 60 MG: 60 INJECTION SUBCUTANEOUS at 21:45

## 2020-01-01 RX ADMIN — Medication 2.5 TABLET: at 09:35

## 2020-01-01 RX ADMIN — METOPROLOL TARTRATE 25 MG: 25 TABLET ORAL at 20:45

## 2020-01-01 RX ADMIN — OMEGA-3 FATTY ACIDS CAP 1000 MG 1 CAPSULE: 1000 CAP at 10:12

## 2020-01-01 RX ADMIN — POTASSIUM BICARBONATE 60 MEQ: 782 TABLET, EFFERVESCENT ORAL at 12:00

## 2020-01-01 RX ADMIN — ENOXAPARIN SODIUM 60 MG: 60 INJECTION SUBCUTANEOUS at 20:45

## 2020-01-01 RX ADMIN — HEPARIN SODIUM 5000 UNITS: 5000 INJECTION INTRAVENOUS; SUBCUTANEOUS at 23:13

## 2020-01-01 RX ADMIN — PIPERACILLIN AND TAZOBACTAM 3.38 G: 3; .375 INJECTION, POWDER, LYOPHILIZED, FOR SOLUTION INTRAVENOUS at 06:07

## 2020-01-01 RX ADMIN — SODIUM CHLORIDE SOLN NEBU 3% 4 ML: 3 NEBU SOLN at 16:52

## 2020-01-01 RX ADMIN — FUROSEMIDE 60 MG: 10 INJECTION, SOLUTION INTRAMUSCULAR; INTRAVENOUS at 19:00

## 2020-01-01 RX ADMIN — INSULIN GLARGINE 10 UNITS: 100 INJECTION, SOLUTION SUBCUTANEOUS at 12:05

## 2020-01-01 RX ADMIN — DEXMEDETOMIDINE 0.2 MCG/KG/HR: 100 INJECTION, SOLUTION, CONCENTRATE INTRAVENOUS at 11:00

## 2020-01-01 RX ADMIN — Medication 100 MCG/HR: at 22:28

## 2020-01-01 RX ADMIN — Medication 400 UNITS: at 09:00

## 2020-01-01 RX ADMIN — FUROSEMIDE 40 MG: 10 INJECTION, SOLUTION INTRAMUSCULAR; INTRAVENOUS at 06:17

## 2020-01-01 RX ADMIN — FAMOTIDINE 20 MG: 10 INJECTION, SOLUTION INTRAVENOUS at 08:59

## 2020-01-01 RX ADMIN — ALBUMIN (HUMAN) 25 G: 12.5 INJECTION, SOLUTION INTRAVENOUS at 13:23

## 2020-01-01 RX ADMIN — DILTIAZEM HYDROCHLORIDE 5 MG: 5 INJECTION INTRAVENOUS at 08:02

## 2020-01-01 RX ADMIN — PIPERACILLIN AND TAZOBACTAM 2.25 G: 2; .25 INJECTION, POWDER, FOR SOLUTION INTRAVENOUS at 16:51

## 2020-01-01 RX ADMIN — MORPHINE SULFATE 5 MG: 2 INJECTION, SOLUTION INTRAMUSCULAR; INTRAVENOUS at 13:28

## 2020-01-01 RX ADMIN — MIDAZOLAM HYDROCHLORIDE 2 MG/HR: 5 INJECTION, SOLUTION INTRAMUSCULAR; INTRAVENOUS at 22:27

## 2020-01-01 RX ADMIN — ALBUTEROL SULFATE 2.5 MG: 2.5 SOLUTION RESPIRATORY (INHALATION) at 07:31

## 2020-01-01 RX ADMIN — GLYCOPYRROLATE 0.2 MG: 0.2 INJECTION INTRAMUSCULAR; INTRAVENOUS at 11:50

## 2020-01-01 RX ADMIN — ALBUTEROL SULFATE 2.5 MG: 2.5 SOLUTION RESPIRATORY (INHALATION) at 19:42

## 2020-01-01 RX ADMIN — LORAZEPAM 0.5 MG: 2 INJECTION INTRAMUSCULAR; INTRAVENOUS at 12:08

## 2020-01-01 RX ADMIN — LORAZEPAM 0.5 MG: 2 INJECTION INTRAMUSCULAR; INTRAVENOUS at 14:30

## 2020-01-01 RX ADMIN — PIPERACILLIN AND TAZOBACTAM 3.38 G: 3; .375 INJECTION, POWDER, LYOPHILIZED, FOR SOLUTION INTRAVENOUS at 23:29

## 2020-01-01 RX ADMIN — Medication 125 MCG/HR: at 11:36

## 2020-01-01 RX ADMIN — LORAZEPAM 0.5 MG: 2 INJECTION INTRAMUSCULAR; INTRAVENOUS at 12:28

## 2020-01-01 RX ADMIN — ENOXAPARIN SODIUM 60 MG: 60 INJECTION SUBCUTANEOUS at 09:07

## 2020-01-01 RX ADMIN — VANCOMYCIN HYDROCHLORIDE 1250 MG: 10 INJECTION, POWDER, LYOPHILIZED, FOR SOLUTION INTRAVENOUS at 12:29

## 2020-01-01 RX ADMIN — INSULIN GLARGINE 10 UNITS: 100 INJECTION, SOLUTION SUBCUTANEOUS at 09:35

## 2020-01-01 RX ADMIN — SODIUM CHLORIDE SOLN NEBU 3% 4 ML: 3 NEBU SOLN at 19:42

## 2020-01-01 RX ADMIN — LORAZEPAM 0.5 MG: 2 INJECTION INTRAMUSCULAR; INTRAVENOUS at 11:50

## 2020-01-01 RX ADMIN — PIPERACILLIN AND TAZOBACTAM 2.25 G: 2; .25 INJECTION, POWDER, FOR SOLUTION INTRAVENOUS at 22:17

## 2020-01-01 RX ADMIN — EPINEPHRINE 10 MCG/MIN: 1 INJECTION, SOLUTION, CONCENTRATE INTRAVENOUS at 22:28

## 2020-01-01 RX ADMIN — ATORVASTATIN CALCIUM 10 MG: 10 TABLET, FILM COATED ORAL at 10:11

## 2020-01-01 RX ADMIN — LORAZEPAM 2 MG: 2 INJECTION INTRAMUSCULAR; INTRAVENOUS at 13:28

## 2020-01-01 RX ADMIN — BENZONATATE 100 MG: 100 CAPSULE ORAL at 10:12

## 2020-01-01 RX ADMIN — Medication 500 MG: at 10:41

## 2020-01-01 RX ADMIN — MIDAZOLAM HYDROCHLORIDE 2 MG: 2 INJECTION, SOLUTION INTRAMUSCULAR; INTRAVENOUS at 03:18

## 2020-01-01 RX ADMIN — DILTIAZEM HYDROCHLORIDE 45 MG: 30 TABLET, FILM COATED ORAL at 10:10

## 2020-01-01 RX ADMIN — MIDAZOLAM HYDROCHLORIDE 3 MG/HR: 5 INJECTION, SOLUTION INTRAMUSCULAR; INTRAVENOUS at 07:16

## 2020-01-01 RX ADMIN — OMEGA-3 FATTY ACIDS CAP 1000 MG 1 CAPSULE: 1000 CAP at 09:30

## 2020-01-01 RX ADMIN — DILTIAZEM HYDROCHLORIDE 120 MG: 120 CAPSULE, COATED, EXTENDED RELEASE ORAL at 10:00

## 2020-01-01 RX ADMIN — FUROSEMIDE 20 MG: 10 INJECTION, SOLUTION INTRAMUSCULAR; INTRAVENOUS at 13:45

## 2020-01-01 RX ADMIN — CHLORHEXIDINE GLUCONATE 0.12% ORAL RINSE 10 ML: 1.2 LIQUID ORAL at 06:08

## 2020-01-01 RX ADMIN — OMEGA-3 FATTY ACIDS CAP 1000 MG 1 CAPSULE: 1000 CAP at 09:34

## 2020-01-01 RX ADMIN — ENOXAPARIN SODIUM 60 MG: 60 INJECTION SUBCUTANEOUS at 10:03

## 2020-01-01 RX ADMIN — GUAIFENESIN 400 MG: 200 SOLUTION ORAL at 03:55

## 2020-01-01 RX ADMIN — BENZONATATE 100 MG: 100 CAPSULE ORAL at 03:13

## 2020-01-01 RX ADMIN — VITAMIN E CAP 400 UNIT 400 UNITS: 400 CAP at 09:56

## 2020-01-01 RX ADMIN — MORPHINE SULFATE 2 MG: 2 INJECTION, SOLUTION INTRAMUSCULAR; INTRAVENOUS at 12:59

## 2020-01-01 RX ADMIN — SODIUM CHLORIDE 10 MG/HR: 900 INJECTION, SOLUTION INTRAVENOUS at 17:45

## 2020-01-01 RX ADMIN — Medication 2.5 TABLET: at 08:29

## 2020-01-01 RX ADMIN — DILTIAZEM HYDROCHLORIDE 45 MG: 30 TABLET, FILM COATED ORAL at 03:20

## 2020-01-01 RX ADMIN — Medication 16 MCG/MIN: at 22:29

## 2020-01-01 RX ADMIN — VITAMIN E CAP 400 UNIT 400 UNITS: 400 CAP at 09:59

## 2020-01-01 RX ADMIN — FUROSEMIDE 40 MG: 10 INJECTION, SOLUTION INTRAMUSCULAR; INTRAVENOUS at 02:44

## 2020-01-01 RX ADMIN — ALBUTEROL SULFATE 2.5 MG: 2.5 SOLUTION RESPIRATORY (INHALATION) at 07:40

## 2020-01-01 RX ADMIN — SODIUM CHLORIDE SOLN NEBU 3% 4 ML: 3 NEBU SOLN at 07:40

## 2020-01-01 RX ADMIN — INSULIN LISPRO 3 UNITS: 100 INJECTION, SOLUTION INTRAVENOUS; SUBCUTANEOUS at 00:57

## 2020-01-01 RX ADMIN — ATORVASTATIN CALCIUM 10 MG: 10 TABLET, FILM COATED ORAL at 09:55

## 2020-01-01 RX ADMIN — SODIUM CHLORIDE SOLN NEBU 3% 4 ML: 3 NEBU SOLN at 10:14

## 2020-01-01 RX ADMIN — MIDAZOLAM HYDROCHLORIDE 2 MG: 2 INJECTION, SOLUTION INTRAMUSCULAR; INTRAVENOUS at 21:03

## 2020-01-01 RX ADMIN — FUROSEMIDE 60 MG: 10 INJECTION, SOLUTION INTRAMUSCULAR; INTRAVENOUS at 18:37

## 2020-01-01 RX ADMIN — HEPARIN SODIUM 5000 UNITS: 5000 INJECTION INTRAVENOUS; SUBCUTANEOUS at 08:00

## 2020-01-01 RX ADMIN — FUROSEMIDE 20 MG: 10 INJECTION, SOLUTION INTRAMUSCULAR; INTRAVENOUS at 18:57

## 2020-01-01 RX ADMIN — Medication 50 MCG/HR: at 10:49

## 2020-01-01 RX ADMIN — CARBAMAZEPINE 100 MG: 100 TABLET, EXTENDED RELEASE ORAL at 20:06

## 2020-01-01 RX ADMIN — AMIODARONE HYDROCHLORIDE 150 MG: 1.5 INJECTION, SOLUTION INTRAVENOUS at 00:56

## 2020-01-01 RX ADMIN — ALBUMIN (HUMAN) 25 G: 0.25 INJECTION, SOLUTION INTRAVENOUS at 05:54

## 2020-01-01 RX ADMIN — DILTIAZEM HYDROCHLORIDE 30 MG: 30 TABLET, FILM COATED ORAL at 11:20

## 2020-01-01 RX ADMIN — FENTANYL CITRATE 100 MCG: 50 INJECTION, SOLUTION INTRAMUSCULAR; INTRAVENOUS at 17:19

## 2020-01-01 RX ADMIN — IPRATROPIUM BROMIDE AND ALBUTEROL SULFATE 3 ML: .5; 3 SOLUTION RESPIRATORY (INHALATION) at 21:44

## 2020-01-01 RX ADMIN — OMEGA-3 FATTY ACIDS CAP 1000 MG 1 CAPSULE: 1000 CAP at 08:02

## 2020-01-01 RX ADMIN — ALBUTEROL SULFATE 2.5 MG: 2.5 SOLUTION RESPIRATORY (INHALATION) at 19:25

## 2020-01-01 RX ADMIN — OMEGA-3 FATTY ACIDS CAP 1000 MG 1 CAPSULE: 1000 CAP at 08:32

## 2020-01-01 RX ADMIN — SODIUM CHLORIDE 2.5 MG/HR: 900 INJECTION, SOLUTION INTRAVENOUS at 21:34

## 2020-01-01 RX ADMIN — GUAIFENESIN AND CODEINE PHOSPHATE 10 ML: 100; 10 SOLUTION ORAL at 03:58

## 2020-01-01 RX ADMIN — CARBAMAZEPINE 100 MG: 100 SUSPENSION ORAL at 11:00

## 2020-01-01 RX ADMIN — FAMOTIDINE 20 MG: 10 INJECTION, SOLUTION INTRAVENOUS at 08:29

## 2020-01-01 RX ADMIN — SODIUM CHLORIDE SOLN NEBU 3% 4 ML: 3 NEBU SOLN at 09:08

## 2020-01-01 RX ADMIN — Medication 2.5 TABLET: at 09:56

## 2020-01-01 RX ADMIN — SODIUM CHLORIDE SOLN NEBU 3% 4 ML: 3 NEBU SOLN at 07:31

## 2020-01-01 RX ADMIN — PIPERACILLIN AND TAZOBACTAM 2.25 G: 2; .25 INJECTION, POWDER, FOR SOLUTION INTRAVENOUS at 06:06

## 2020-01-01 RX ADMIN — MULTIPLE VITAMINS W/ MINERALS TAB 1 TABLET: TAB at 10:00

## 2020-01-01 RX ADMIN — FAMOTIDINE 20 MG: 10 INJECTION, SOLUTION INTRAVENOUS at 09:56

## 2020-01-01 RX ADMIN — FENTANYL CITRATE 100 MCG: 50 INJECTION, SOLUTION INTRAMUSCULAR; INTRAVENOUS at 07:54

## 2020-01-01 RX ADMIN — CARBAMAZEPINE 100 MG: 100 TABLET, EXTENDED RELEASE ORAL at 22:00

## 2020-01-01 RX ADMIN — METOPROLOL TARTRATE 12.5 MG: 25 TABLET ORAL at 08:18

## 2020-01-01 RX ADMIN — PIPERACILLIN AND TAZOBACTAM 3.38 G: 3; .375 INJECTION, POWDER, LYOPHILIZED, FOR SOLUTION INTRAVENOUS at 16:15

## 2020-01-01 RX ADMIN — HEPARIN SODIUM 5000 UNITS: 5000 INJECTION INTRAVENOUS; SUBCUTANEOUS at 17:00

## 2020-01-01 RX ADMIN — AZITHROMYCIN MONOHYDRATE 500 MG: 500 INJECTION, POWDER, LYOPHILIZED, FOR SOLUTION INTRAVENOUS at 16:55

## 2020-01-01 RX ADMIN — CHLOROTHIAZIDE SODIUM 250 MG: 500 INJECTION, POWDER, LYOPHILIZED, FOR SOLUTION INTRAVENOUS at 00:33

## 2020-01-01 RX ADMIN — INSULIN LISPRO 4 UNITS: 100 INJECTION, SOLUTION INTRAVENOUS; SUBCUTANEOUS at 06:13

## 2020-01-01 RX ADMIN — ENOXAPARIN SODIUM 60 MG: 80 INJECTION SUBCUTANEOUS at 17:55

## 2020-01-01 RX ADMIN — LORAZEPAM 0.5 MG: 2 INJECTION INTRAMUSCULAR; INTRAVENOUS at 13:52

## 2020-01-01 RX ADMIN — SODIUM CHLORIDE SOLN NEBU 3% 4 ML: 3 NEBU SOLN at 19:25

## 2020-01-01 RX ADMIN — LORAZEPAM 1 MG: 2 INJECTION INTRAMUSCULAR; INTRAVENOUS at 15:14

## 2020-01-01 RX ADMIN — METOPROLOL TARTRATE 25 MG: 25 TABLET ORAL at 21:33

## 2020-01-01 RX ADMIN — OMEGA-3 FATTY ACIDS CAP 1000 MG 1 CAPSULE: 1000 CAP at 08:59

## 2020-01-01 RX ADMIN — GUAIFENESIN AND CODEINE PHOSPHATE 5 ML: 100; 10 SOLUTION ORAL at 06:15

## 2020-01-01 RX ADMIN — MIDAZOLAM HYDROCHLORIDE 2 MG: 2 INJECTION, SOLUTION INTRAMUSCULAR; INTRAVENOUS at 19:57

## 2020-01-01 RX ADMIN — MIDAZOLAM HYDROCHLORIDE 2 MG: 2 INJECTION, SOLUTION INTRAMUSCULAR; INTRAVENOUS at 22:14

## 2020-01-01 RX ADMIN — CARBAMAZEPINE 100 MG: 100 TABLET, EXTENDED RELEASE ORAL at 20:45

## 2020-01-01 RX ADMIN — AZITHROMYCIN MONOHYDRATE 500 MG: 500 INJECTION, POWDER, LYOPHILIZED, FOR SOLUTION INTRAVENOUS at 17:49

## 2020-01-01 RX ADMIN — DILTIAZEM HYDROCHLORIDE 45 MG: 30 TABLET, FILM COATED ORAL at 17:12

## 2020-01-01 RX ADMIN — GUAIFENESIN AND CODEINE PHOSPHATE 10 ML: 100; 10 SOLUTION ORAL at 13:04

## 2020-01-01 RX ADMIN — METOPROLOL TARTRATE 25 MG: 25 TABLET ORAL at 10:03

## 2020-01-01 RX ADMIN — Medication 2.5 TABLET: at 10:11

## 2020-01-01 RX ADMIN — OMEGA-3 FATTY ACIDS CAP 1000 MG 1 CAPSULE: 1000 CAP at 09:59

## 2020-01-01 RX ADMIN — Medication 100 MCG/HR: at 18:36

## 2020-01-01 RX ADMIN — GUAIFENESIN AND CODEINE PHOSPHATE 5 ML: 100; 10 SOLUTION ORAL at 21:44

## 2020-01-01 RX ADMIN — INSULIN LISPRO 3 UNITS: 100 INJECTION, SOLUTION INTRAVENOUS; SUBCUTANEOUS at 18:00

## 2020-01-01 RX ADMIN — DILTIAZEM HYDROCHLORIDE 45 MG: 30 TABLET, FILM COATED ORAL at 14:12

## 2020-01-01 RX ADMIN — VASOPRESSIN 0.03 UNITS/MIN: 20 INJECTION INTRAVENOUS at 05:27

## 2020-01-01 RX ADMIN — ATORVASTATIN CALCIUM 10 MG: 10 TABLET, FILM COATED ORAL at 08:28

## 2020-01-01 RX ADMIN — WATER 2 G: 1 INJECTION INTRAMUSCULAR; INTRAVENOUS; SUBCUTANEOUS at 14:48

## 2020-01-01 RX ADMIN — GUAIFENESIN AND CODEINE PHOSPHATE 5 ML: 100; 10 SOLUTION ORAL at 02:15

## 2020-01-01 RX ADMIN — Medication 2.5 TABLET: at 08:14

## 2020-01-01 RX ADMIN — MIDAZOLAM HYDROCHLORIDE 3 MG/HR: 5 INJECTION, SOLUTION INTRAMUSCULAR; INTRAVENOUS at 11:29

## 2020-01-01 RX ADMIN — HEPARIN SODIUM 5000 UNITS: 5000 INJECTION INTRAVENOUS; SUBCUTANEOUS at 23:25

## 2020-01-01 RX ADMIN — METOPROLOL TARTRATE 25 MG: 25 TABLET ORAL at 08:02

## 2020-01-01 RX ADMIN — MULTIPLE VITAMIN LIQUID 30 ML: LIQUID at 08:00

## 2020-01-01 RX ADMIN — FUROSEMIDE 20 MG: 10 INJECTION, SOLUTION INTRAMUSCULAR; INTRAVENOUS at 23:01

## 2020-01-01 RX ADMIN — MORPHINE SULFATE 2 MG: 2 INJECTION, SOLUTION INTRAMUSCULAR; INTRAVENOUS at 12:28

## 2020-01-01 RX ADMIN — Medication 150 MCG/HR: at 06:29

## 2020-01-01 RX ADMIN — HEPARIN SODIUM 5000 UNITS: 5000 INJECTION INTRAVENOUS; SUBCUTANEOUS at 17:12

## 2020-01-01 RX ADMIN — Medication 500 MG: at 08:00

## 2020-01-01 RX ADMIN — INSULIN LISPRO 6 UNITS: 100 INJECTION, SOLUTION INTRAVENOUS; SUBCUTANEOUS at 17:46

## 2020-01-01 RX ADMIN — SODIUM CHLORIDE SOLN NEBU 3% 4 ML: 3 NEBU SOLN at 17:11

## 2020-01-01 RX ADMIN — FENTANYL CITRATE 50 MCG: 50 INJECTION, SOLUTION INTRAMUSCULAR; INTRAVENOUS at 20:02

## 2020-01-01 RX ADMIN — SODIUM CHLORIDE SOLN NEBU 3% 4 ML: 3 NEBU SOLN at 20:29

## 2020-01-01 RX ADMIN — ALBUTEROL SULFATE 2.5 MG: 2.5 SOLUTION RESPIRATORY (INHALATION) at 10:14

## 2020-01-01 RX ADMIN — PIPERACILLIN AND TAZOBACTAM 3.38 G: 3; .375 INJECTION, POWDER, LYOPHILIZED, FOR SOLUTION INTRAVENOUS at 12:07

## 2020-01-01 RX ADMIN — VASOPRESSIN 0.03 UNITS/MIN: 20 INJECTION INTRAVENOUS at 23:50

## 2020-01-01 RX ADMIN — FAMOTIDINE 20 MG: 10 INJECTION, SOLUTION INTRAVENOUS at 08:00

## 2020-01-01 RX ADMIN — VITAMIN D, TAB 1000IU (100/BT) 1 TABLET: 25 TAB at 11:00

## 2020-01-01 RX ADMIN — BENZONATATE 100 MG: 100 CAPSULE ORAL at 09:57

## 2020-01-01 RX ADMIN — PIPERACILLIN AND TAZOBACTAM 3.38 G: 3; .375 INJECTION, POWDER, LYOPHILIZED, FOR SOLUTION INTRAVENOUS at 01:46

## 2020-01-01 RX ADMIN — PIPERACILLIN AND TAZOBACTAM 2.25 G: 2; .25 INJECTION, POWDER, FOR SOLUTION INTRAVENOUS at 17:45

## 2020-01-01 RX ADMIN — GUAIFENESIN 400 MG: 200 SOLUTION ORAL at 20:46

## 2020-01-01 RX ADMIN — ENOXAPARIN SODIUM 60 MG: 60 INJECTION SUBCUTANEOUS at 09:30

## 2020-01-01 RX ADMIN — FUROSEMIDE 40 MG: 10 INJECTION, SOLUTION INTRAMUSCULAR; INTRAVENOUS at 11:32

## 2020-01-01 RX ADMIN — MORPHINE SULFATE: 10 INJECTION, SOLUTION INTRAMUSCULAR; INTRAVENOUS at 17:07

## 2020-01-01 RX ADMIN — CARBAMAZEPINE 100 MG: 100 SUSPENSION ORAL at 09:08

## 2020-01-01 RX ADMIN — SODIUM CHLORIDE 50 ML/HR: 900 INJECTION, SOLUTION INTRAVENOUS at 03:16

## 2020-01-01 RX ADMIN — FUROSEMIDE 40 MG: 10 INJECTION, SOLUTION INTRAMUSCULAR; INTRAVENOUS at 12:00

## 2020-01-01 RX ADMIN — BENZONATATE 100 MG: 100 CAPSULE ORAL at 17:13

## 2020-01-01 RX ADMIN — Medication 100 MCG/HR: at 03:01

## 2020-01-01 RX ADMIN — MIDAZOLAM HYDROCHLORIDE 2 MG: 2 INJECTION, SOLUTION INTRAMUSCULAR; INTRAVENOUS at 20:08

## 2020-01-01 RX ADMIN — HYDROCORTISONE SODIUM SUCCINATE 50 MG: 100 INJECTION, POWDER, FOR SOLUTION INTRAMUSCULAR; INTRAVENOUS at 05:28

## 2020-01-01 RX ADMIN — Medication 500 MG: at 09:30

## 2020-01-01 RX ADMIN — FUROSEMIDE 20 MG: 10 INJECTION, SOLUTION INTRAMUSCULAR; INTRAVENOUS at 05:18

## 2020-01-01 RX ADMIN — VITAMIN E CAP 400 UNIT 400 UNITS: 400 CAP at 09:34

## 2020-01-01 RX ADMIN — PIPERACILLIN AND TAZOBACTAM 2.25 G: 2; .25 INJECTION, POWDER, FOR SOLUTION INTRAVENOUS at 23:06

## 2020-01-01 RX ADMIN — SODIUM CHLORIDE 10 MG/HR: 900 INJECTION, SOLUTION INTRAVENOUS at 20:18

## 2020-01-01 RX ADMIN — ATORVASTATIN CALCIUM 10 MG: 10 TABLET, FILM COATED ORAL at 08:00

## 2020-01-01 RX ADMIN — FUROSEMIDE 40 MG: 10 INJECTION, SOLUTION INTRAMUSCULAR; INTRAVENOUS at 13:23

## 2020-01-01 RX ADMIN — AMIODARONE HYDROCHLORIDE 1 MG/MIN: 1.8 INJECTION, SOLUTION INTRAVENOUS at 06:07

## 2020-01-01 RX ADMIN — GUAIFENESIN AND CODEINE PHOSPHATE 10 ML: 100; 10 SOLUTION ORAL at 02:25

## 2020-01-01 RX ADMIN — INSULIN LISPRO 9 UNITS: 100 INJECTION, SOLUTION INTRAVENOUS; SUBCUTANEOUS at 12:05

## 2020-01-01 RX ADMIN — DILTIAZEM HYDROCHLORIDE 45 MG: 30 TABLET, FILM COATED ORAL at 20:45

## 2020-01-01 RX ADMIN — ALBUTEROL SULFATE 2.5 MG: 2.5 SOLUTION RESPIRATORY (INHALATION) at 16:52

## 2020-01-01 RX ADMIN — MORPHINE SULFATE 2 MG: 2 INJECTION, SOLUTION INTRAMUSCULAR; INTRAVENOUS at 16:32

## 2020-01-01 RX ADMIN — HYDROCORTISONE SODIUM SUCCINATE 25 MG: 100 INJECTION, POWDER, FOR SOLUTION INTRAMUSCULAR; INTRAVENOUS at 09:36

## 2020-01-01 RX ADMIN — ALBUTEROL SULFATE 2 PUFF: 90 AEROSOL, METERED RESPIRATORY (INHALATION) at 03:17

## 2020-01-01 RX ADMIN — INSULIN LISPRO 3 UNITS: 100 INJECTION, SOLUTION INTRAVENOUS; SUBCUTANEOUS at 23:13

## 2020-01-01 RX ADMIN — ALBUTEROL SULFATE 2.5 MG: 2.5 SOLUTION RESPIRATORY (INHALATION) at 17:11

## 2020-01-01 RX ADMIN — SODIUM CHLORIDE 2.5 MG/HR: 900 INJECTION, SOLUTION INTRAVENOUS at 15:22

## 2020-01-01 RX ADMIN — ALBUTEROL SULFATE 2.5 MG: 2.5 SOLUTION RESPIRATORY (INHALATION) at 15:11

## 2020-01-01 RX ADMIN — Medication 12 MCG/MIN: at 10:58

## 2020-01-01 RX ADMIN — FUROSEMIDE 40 MG: 10 INJECTION, SOLUTION INTRAMUSCULAR; INTRAVENOUS at 17:05

## 2020-01-01 RX ADMIN — Medication 500 MG: at 10:12

## 2020-01-01 RX ADMIN — Medication 500 MG: at 08:59

## 2020-01-01 RX ADMIN — AMIODARONE HYDROCHLORIDE 0.5 MG/MIN: 1.8 INJECTION, SOLUTION INTRAVENOUS at 05:28

## 2020-01-01 RX ADMIN — SODIUM CHLORIDE 50 ML/HR: 900 INJECTION, SOLUTION INTRAVENOUS at 07:15

## 2020-01-01 RX ADMIN — INSULIN LISPRO 3 UNITS: 100 INJECTION, SOLUTION INTRAVENOUS; SUBCUTANEOUS at 06:08

## 2020-01-01 RX ADMIN — HEPARIN SODIUM 5000 UNITS: 5000 INJECTION INTRAVENOUS; SUBCUTANEOUS at 09:34

## 2020-01-01 RX ADMIN — ENOXAPARIN SODIUM 60 MG: 60 INJECTION SUBCUTANEOUS at 21:35

## 2020-01-01 RX ADMIN — SODIUM CHLORIDE SOLN NEBU 3% 4 ML: 3 NEBU SOLN at 20:54

## 2020-01-01 RX ADMIN — INSULIN LISPRO 6 UNITS: 100 INJECTION, SOLUTION INTRAVENOUS; SUBCUTANEOUS at 05:42

## 2020-01-01 RX ADMIN — GUAIFENESIN 400 MG: 200 SOLUTION ORAL at 16:55

## 2020-01-01 RX ADMIN — MULTIPLE VITAMIN LIQUID 30 ML: LIQUID at 08:59

## 2020-01-01 RX ADMIN — HEPARIN SODIUM 5000 UNITS: 5000 INJECTION INTRAVENOUS; SUBCUTANEOUS at 08:45

## 2020-01-01 RX ADMIN — WATER 2 G: 1 INJECTION INTRAMUSCULAR; INTRAVENOUS; SUBCUTANEOUS at 14:18

## 2020-01-01 RX ADMIN — ALBUTEROL SULFATE 2.5 MG: 2.5 SOLUTION RESPIRATORY (INHALATION) at 12:17

## 2020-01-01 RX ADMIN — MULTIPLE VITAMIN LIQUID 30 ML: LIQUID at 08:28

## 2020-01-01 RX ADMIN — AMIODARONE HYDROCHLORIDE 0.5 MG/MIN: 1.8 INJECTION, SOLUTION INTRAVENOUS at 06:06

## 2020-01-01 RX ADMIN — GUAIFENESIN AND CODEINE PHOSPHATE 5 ML: 100; 10 SOLUTION ORAL at 17:22

## 2020-01-01 RX ADMIN — AMIODARONE HYDROCHLORIDE 0.5 MG/MIN: 1.8 INJECTION, SOLUTION INTRAVENOUS at 16:52

## 2020-01-01 RX ADMIN — Medication 150 MCG/HR: at 12:34

## 2020-01-01 RX ADMIN — FENTANYL CITRATE 100 MCG: 50 INJECTION, SOLUTION INTRAMUSCULAR; INTRAVENOUS at 21:46

## 2020-01-01 RX ADMIN — CARBAMAZEPINE 100 MG: 100 TABLET, EXTENDED RELEASE ORAL at 08:29

## 2020-01-01 RX ADMIN — MONTELUKAST 10 MG: 10 TABLET, FILM COATED ORAL at 09:58

## 2020-01-01 RX ADMIN — HYDROCORTISONE SODIUM SUCCINATE 50 MG: 100 INJECTION, POWDER, FOR SOLUTION INTRAMUSCULAR; INTRAVENOUS at 14:00

## 2020-01-01 RX ADMIN — Medication 2.5 TABLET: at 10:00

## 2020-01-01 RX ADMIN — CARBAMAZEPINE 100 MG: 100 TABLET, EXTENDED RELEASE ORAL at 08:00

## 2020-01-01 RX ADMIN — ALBUMIN (HUMAN) 25 G: 0.25 INJECTION, SOLUTION INTRAVENOUS at 23:01

## 2020-01-01 RX ADMIN — WATER 2 G: 1 INJECTION INTRAMUSCULAR; INTRAVENOUS; SUBCUTANEOUS at 17:49

## 2020-01-01 RX ADMIN — PIPERACILLIN AND TAZOBACTAM 2.25 G: 2; .25 INJECTION, POWDER, FOR SOLUTION INTRAVENOUS at 22:02

## 2020-01-01 RX ADMIN — OMEGA-3 FATTY ACIDS CAP 1000 MG 1 CAPSULE: 1000 CAP at 08:29

## 2020-01-01 RX ADMIN — ALBUTEROL SULFATE 2.5 MG: 2.5 SOLUTION RESPIRATORY (INHALATION) at 20:43

## 2020-01-01 RX ADMIN — ENOXAPARIN SODIUM 60 MG: 60 INJECTION SUBCUTANEOUS at 08:02

## 2020-01-01 RX ADMIN — Medication 100 MCG/HR: at 17:46

## 2020-01-01 RX ADMIN — MIDAZOLAM HYDROCHLORIDE 2 MG: 2 INJECTION, SOLUTION INTRAMUSCULAR; INTRAVENOUS at 03:26

## 2020-01-01 RX ADMIN — CHLORHEXIDINE GLUCONATE 0.12% ORAL RINSE 10 ML: 1.2 LIQUID ORAL at 09:34

## 2020-01-01 RX ADMIN — INSULIN LISPRO 3 UNITS: 100 INJECTION, SOLUTION INTRAVENOUS; SUBCUTANEOUS at 23:58

## 2020-01-01 RX ADMIN — METOPROLOL TARTRATE 12.5 MG: 25 TABLET ORAL at 09:07

## 2020-01-01 RX ADMIN — VITAMIN E CAP 400 UNIT 400 UNITS: 400 CAP at 08:00

## 2020-01-01 RX ADMIN — IPRATROPIUM BROMIDE AND ALBUTEROL SULFATE 3 ML: .5; 3 SOLUTION RESPIRATORY (INHALATION) at 17:13

## 2020-01-01 RX ADMIN — SODIUM CHLORIDE SOLN NEBU 3% 4 ML: 3 NEBU SOLN at 20:43

## 2020-01-01 RX ADMIN — MIDAZOLAM HYDROCHLORIDE 2 MG: 2 INJECTION, SOLUTION INTRAMUSCULAR; INTRAVENOUS at 06:15

## 2020-01-01 RX ADMIN — ALBUTEROL SULFATE 2.5 MG: 2.5 SOLUTION RESPIRATORY (INHALATION) at 12:13

## 2020-01-01 RX ADMIN — PIPERACILLIN AND TAZOBACTAM 2.25 G: 2; .25 INJECTION, POWDER, FOR SOLUTION INTRAVENOUS at 16:42

## 2020-01-01 RX ADMIN — SODIUM CHLORIDE SOLN NEBU 3% 4 ML: 3 NEBU SOLN at 19:45

## 2020-01-01 RX ADMIN — BENZONATATE 200 MG: 100 CAPSULE ORAL at 21:22

## 2020-01-01 RX ADMIN — CARBAMAZEPINE 100 MG: 100 SUSPENSION ORAL at 22:30

## 2020-01-01 RX ADMIN — Medication 100 MCG/HR: at 02:33

## 2020-01-01 RX ADMIN — ALBUTEROL SULFATE 2.5 MG: 1.25 SOLUTION RESPIRATORY (INHALATION) at 09:57

## 2020-01-01 RX ADMIN — AZITHROMYCIN MONOHYDRATE 500 MG: 500 INJECTION, POWDER, LYOPHILIZED, FOR SOLUTION INTRAVENOUS at 17:25

## 2020-01-01 RX ADMIN — MIDAZOLAM HYDROCHLORIDE 3 MG/HR: 5 INJECTION, SOLUTION INTRAMUSCULAR; INTRAVENOUS at 22:06

## 2020-01-01 RX ADMIN — GUAIFENESIN 400 MG: 200 SOLUTION ORAL at 08:02

## 2020-01-01 RX ADMIN — SODIUM CHLORIDE SOLN NEBU 3% 4 ML: 3 NEBU SOLN at 08:51

## 2020-01-01 RX ADMIN — LORAZEPAM 0.5 MG: 2 INJECTION INTRAMUSCULAR; INTRAVENOUS at 12:58

## 2020-01-01 RX ADMIN — CARBAMAZEPINE 100 MG: 100 TABLET, EXTENDED RELEASE ORAL at 09:55

## 2020-01-01 RX ADMIN — Medication 16 MCG/MIN: at 01:55

## 2020-01-01 RX ADMIN — SODIUM CHLORIDE 15 MG/HR: 900 INJECTION, SOLUTION INTRAVENOUS at 15:27

## 2020-01-01 RX ADMIN — MIDAZOLAM HYDROCHLORIDE 2 MG: 2 INJECTION, SOLUTION INTRAMUSCULAR; INTRAVENOUS at 00:11

## 2020-01-01 RX ADMIN — ALBUTEROL SULFATE 2.5 MG: 2.5 SOLUTION RESPIRATORY (INHALATION) at 20:31

## 2020-01-01 RX ADMIN — CHLORHEXIDINE GLUCONATE 0.12% ORAL RINSE 10 ML: 1.2 LIQUID ORAL at 08:59

## 2020-01-01 RX ADMIN — INSULIN GLARGINE 10 UNITS: 100 INJECTION, SOLUTION SUBCUTANEOUS at 08:00

## 2020-01-01 RX ADMIN — HEPARIN SODIUM 5000 UNITS: 5000 INJECTION INTRAVENOUS; SUBCUTANEOUS at 16:42

## 2020-01-01 RX ADMIN — Medication 2.5 TABLET: at 08:00

## 2020-01-01 RX ADMIN — PIPERACILLIN AND TAZOBACTAM 2.25 G: 2; .25 INJECTION, POWDER, FOR SOLUTION INTRAVENOUS at 04:02

## 2020-01-01 RX ADMIN — VITAMIN E CAP 400 UNIT 400 UNITS: 400 CAP at 08:28

## 2020-01-01 RX ADMIN — FENTANYL CITRATE 100 MCG: 50 INJECTION, SOLUTION INTRAMUSCULAR; INTRAVENOUS at 12:01

## 2020-01-01 RX ADMIN — MORPHINE SULFATE 2 MG: 2 INJECTION, SOLUTION INTRAMUSCULAR; INTRAVENOUS at 13:52

## 2020-01-01 RX ADMIN — AZITHROMYCIN MONOHYDRATE 500 MG: 500 INJECTION, POWDER, LYOPHILIZED, FOR SOLUTION INTRAVENOUS at 15:11

## 2020-01-01 RX ADMIN — SODIUM CHLORIDE SOLN NEBU 3% 4 ML: 3 NEBU SOLN at 12:17

## 2020-01-01 RX ADMIN — SODIUM CHLORIDE 10 MG/HR: 900 INJECTION, SOLUTION INTRAVENOUS at 22:16

## 2020-01-01 RX ADMIN — MULTIPLE VITAMINS W/ MINERALS TAB 1 TABLET: TAB at 10:12

## 2020-01-01 RX ADMIN — FUROSEMIDE 40 MG: 10 INJECTION, SOLUTION INTRAMUSCULAR; INTRAVENOUS at 17:00

## 2020-01-01 RX ADMIN — INSULIN GLARGINE 10 UNITS: 100 INJECTION, SOLUTION SUBCUTANEOUS at 08:30

## 2020-01-01 RX ADMIN — MIDAZOLAM HYDROCHLORIDE 2 MG: 2 INJECTION, SOLUTION INTRAMUSCULAR; INTRAVENOUS at 10:36

## 2020-01-01 RX ADMIN — GUAIFENESIN AND CODEINE PHOSPHATE 5 ML: 100; 10 SOLUTION ORAL at 14:47

## 2020-01-01 RX ADMIN — MIDAZOLAM HYDROCHLORIDE 1 MG/HR: 5 INJECTION, SOLUTION INTRAMUSCULAR; INTRAVENOUS at 07:43

## 2020-01-01 RX ADMIN — CARBAMAZEPINE 100 MG: 100 TABLET, EXTENDED RELEASE ORAL at 23:32

## 2020-01-01 RX ADMIN — CARBAMAZEPINE 100 MG: 100 TABLET, EXTENDED RELEASE ORAL at 21:04

## 2020-01-01 RX ADMIN — Medication 500 MG: at 08:17

## 2020-01-01 RX ADMIN — SODIUM CHLORIDE SOLN NEBU 3% 4 ML: 3 NEBU SOLN at 12:14

## 2020-01-01 RX ADMIN — HEPARIN SODIUM 5000 UNITS: 5000 INJECTION INTRAVENOUS; SUBCUTANEOUS at 16:51

## 2020-01-01 RX ADMIN — PIPERACILLIN AND TAZOBACTAM 2.25 G: 2; .25 INJECTION, POWDER, FOR SOLUTION INTRAVENOUS at 11:00

## 2020-01-01 RX ADMIN — CARBAMAZEPINE 100 MG: 100 TABLET, EXTENDED RELEASE ORAL at 09:34

## 2020-01-01 RX ADMIN — ATORVASTATIN CALCIUM 10 MG: 10 TABLET, FILM COATED ORAL at 08:59

## 2020-01-01 RX ADMIN — ALBUMIN (HUMAN) 25 G: 0.25 INJECTION, SOLUTION INTRAVENOUS at 17:12

## 2020-01-01 RX ADMIN — SODIUM CHLORIDE 15 MG/HR: 900 INJECTION, SOLUTION INTRAVENOUS at 21:28

## 2020-01-01 RX ADMIN — MIDAZOLAM HYDROCHLORIDE 2 MG: 2 INJECTION, SOLUTION INTRAMUSCULAR; INTRAVENOUS at 06:02

## 2020-01-01 RX ADMIN — GUAIFENESIN 400 MG: 200 SOLUTION ORAL at 03:20

## 2020-01-01 RX ADMIN — Medication 150 MCG/HR: at 00:32

## 2020-01-01 RX ADMIN — HEPARIN SODIUM 5000 UNITS: 5000 INJECTION INTRAVENOUS; SUBCUTANEOUS at 23:01

## 2020-01-01 RX ADMIN — SODIUM CHLORIDE 7.5 MG/HR: 900 INJECTION, SOLUTION INTRAVENOUS at 15:55

## 2020-01-01 RX ADMIN — ATORVASTATIN CALCIUM 10 MG: 10 TABLET, FILM COATED ORAL at 10:41

## 2020-01-01 RX ADMIN — OMEGA-3 FATTY ACIDS CAP 1000 MG 1 CAPSULE: 1000 CAP at 08:00

## 2020-01-01 RX ADMIN — GUAIFENESIN AND CODEINE PHOSPHATE 5 ML: 100; 10 SOLUTION ORAL at 10:02

## 2020-01-01 RX ADMIN — INSULIN LISPRO 6 UNITS: 100 INJECTION, SOLUTION INTRAVENOUS; SUBCUTANEOUS at 05:59

## 2020-01-01 RX ADMIN — Medication 500 MG: at 08:29

## 2020-01-01 RX ADMIN — Medication 500 MG: at 09:55

## 2020-01-01 RX ADMIN — MORPHINE SULFATE 2 MG: 2 INJECTION, SOLUTION INTRAMUSCULAR; INTRAVENOUS at 15:14

## 2020-01-01 RX ADMIN — VASOPRESSIN 0.03 UNITS/MIN: 20 INJECTION INTRAVENOUS at 07:10

## 2020-01-01 RX ADMIN — METOPROLOL TARTRATE 12.5 MG: 25 TABLET ORAL at 03:15

## 2020-01-01 RX ADMIN — IPRATROPIUM BROMIDE AND ALBUTEROL SULFATE 3 ML: .5; 3 SOLUTION RESPIRATORY (INHALATION) at 01:47

## 2020-01-16 PROBLEM — E87.1 HYPONATREMIA: Status: ACTIVE | Noted: 2020-01-01

## 2020-01-16 PROBLEM — I48.91 A-FIB (HCC): Status: ACTIVE | Noted: 2020-01-01

## 2020-01-16 PROBLEM — J18.9 PNEUMONIA: Status: ACTIVE | Noted: 2020-01-01

## 2020-01-16 NOTE — ED TRIAGE NOTES
Pt presents via EMS from PCP office. Per EMS patient O2 was 85% on RA PTA. Pt has had cough and congestion for 2 weeks. EMS stated pt was in Afib RVR upon arrival. Pt AxOx4, GCS 15, and tachycardic and tachypnic at this time.

## 2020-01-16 NOTE — ED PROVIDER NOTES
EMERGENCY DEPARTMENT HISTORY AND PHYSICAL EXAM 
 
1:21 PM 
 
 
Date: 1/16/2020 Patient Name: Hyun Malcolm History of Presenting Illness Chief Complaint Patient presents with  Shortness of Breath  Irregular Heart Beat RVR History Provided By: Patient Location/Duration/Severity/Modifying factors HPI 
79 y/o F wx hx of htn and reportedly prior episodes of afib presenting from PCP office for tachycardia and concern for pneumonia with sepsis. Patient provides that she has had a cough x2 weeks, managed by [facility] but she went for f/u today and was sent to the ED for SOB and elevated HR. Dr Ting Aguilera spoke with Dr Ana Marlow prior to sending to ED, unsure if patient was in afib or sinus tachycardia due to sepsis. PCP: July Larios MD 
 
Current Facility-Administered Medications Medication Dose Route Frequency Provider Last Rate Last Dose  sodium chloride (NS) flush 5-10 mL  5-10 mL IntraVENous PRN Dilma Rahman M, DO      
 cefTRIAXone (ROCEPHIN) 2 g in sterile water (preservative free) 20 mL IV syringe  2 g IntraVENous Q24H Dilma Rahman M, DO   2 g at 01/16/20 1418  
 azithromycin (ZITHROMAX) 500 mg in  mL  500 mg IntraVENous Q24H Dilma Butt M, DO   500 mg at 01/16/20 1419  
 dilTIAZem (CARDIZEM) 100 mg in 0.9% sodium chloride (MBP/ADV) 100 mL infusion  0-15 mg/hr IntraVENous TITRATE Brandy Burlesonstein, DO 7.5 mL/hr at 01/16/20 1555 7.5 mg/hr at 01/16/20 1555  enoxaparin (LOVENOX) injection 60 mg  1 mg/kg SubCUTAneous NOW Brandy Tucker, DO      
 
Current Outpatient Medications Medication Sig Dispense Refill  multivit-min/iron/folic/lutein (CENTRUM SILVER WOMEN PO) Take  by mouth.  fish oil-omega-3 fatty acids 300-500 mg cap Take  by mouth.  vitamin E (AQUA GEMS) 400 unit capsule Take  by mouth daily.  AZO CRANBERRY [id number] mg-mg-million tab Take  by mouth.  ascorbic acid, vitamin C, (VITAMIN C) 500 mg tablet Take  by mouth.  cholecalciferol (VITAMIN D3) 2,000 unit cap capsule Take  by mouth two (2) times a day.  amLODIPine (NORVASC) 5 mg tablet TK 1 T PO QD  3  
 aspirin (ASPIRIN) 325 mg tablet Take 975 mg by mouth three (3) times daily.  carBAMazepine ER (CARBATROL ER) 100 mg capsule Take 100 mg by mouth two (2) times a day.  LORazepam (ATIVAN) 0.5 mg tablet Take  by mouth every eight (8) hours as needed for Anxiety.  montelukast (SINGULAIR) 10 mg tablet Take 10 mg by mouth as needed.  losartan (COZAAR) 100 mg tablet Take 100 mg by mouth daily. Past History Past Medical History: 
Past Medical History:  
Diagnosis Date  Dyslipidemia  H/O echocardiogram 07/2018 EF 65%, thickening of the mitral valve leaflets with annular calcification, no regurgitation or stenosis  Hypertension  Syncope 07/2018 Past Surgical History: No past surgical history on file. Family History: No family history on file. Social History: 
Social History Tobacco Use  Smoking status: Never Smoker  Smokeless tobacco: Never Used Substance Use Topics  Alcohol use: No  
 Drug use: Not on file Allergies: Allergies Allergen Reactions  Other Food Hives  Ace Inhibitors Unknown (comments)  Chlorhexidine Other (comments) Hives and swelling on contact  Ciprofloxacin Unknown (comments)  Hibiclens [Chlorhexidine Gluconate] Rash  Lisinopril Cough  Lyrica [Pregabalin] Unknown (comments)  Macrobid [Nitrofurantoin Monohyd/M-Cryst] Unknown (comments)  Minoxidil Swelling  Neurontin [Gabapentin] Unknown (comments)  Nitrofurantoin Unknown (comments) Nerve pain  Other Medication Hives Most blood pressure meds  Sulfa (Sulfonamide Antibiotics) Unknown (comments)  Tenex [Guanfacine] Unknown (comments)  Zocor [Simvastatin] Unknown (comments) Review of Systems Review of Systems Constitutional: Positive for chills and fever. HENT: Negative for congestion. Eyes: Negative for pain. Respiratory: Positive for cough and shortness of breath. Cardiovascular: Negative for chest pain, palpitations and leg swelling. Gastrointestinal: Negative for abdominal pain, diarrhea, nausea and vomiting. Genitourinary: Negative for difficulty urinating. Musculoskeletal: Negative for neck pain. Skin: Negative for rash. Neurological: Negative for headaches. Psychiatric/Behavioral: Negative for confusion. Physical Exam  
 
Visit Vitals /76 Pulse (!) 140 Temp 98.6 °F (37 °C) Resp 29 Ht 4' 10\" (1.473 m) Wt 63.5 kg (140 lb) SpO2 95% BMI 29.26 kg/m² Physical Exam 
Constitutional:   
   General: She is not in acute distress. Appearance: She is well-developed. HENT:  
   Head: Normocephalic and atraumatic. Mouth/Throat:  
   Mouth: Mucous membranes are moist.  
   Pharynx: Oropharynx is clear. Eyes:  
   Extraocular Movements: Extraocular movements intact. Pupils: Pupils are equal, round, and reactive to light. Cardiovascular:  
   Rate and Rhythm: Tachycardia present. Rhythm irregular. Pulses: Normal pulses. Pulmonary:  
   Effort: Pulmonary effort is normal. No respiratory distress. Breath sounds: Examination of the right-upper field reveals rhonchi. Examination of the right-middle field reveals rhonchi. Examination of the right-lower field reveals rhonchi. Rhonchi present. Chest:  
   Chest wall: No tenderness. Abdominal:  
   General: Bowel sounds are normal.  
   Palpations: Abdomen is soft. Tenderness: There is no tenderness. Musculoskeletal:  
   Right lower leg: She exhibits no tenderness. No edema. Left lower leg: She exhibits no tenderness. No edema. Lymphadenopathy:  
   Cervical: No cervical adenopathy. Skin: 
   General: Skin is warm and dry. Neurological:  
   General: No focal deficit present. Mental Status: She is alert and oriented to person, place, and time. Cranial Nerves: No cranial nerve deficit. Motor: No weakness. Psychiatric:     
   Mood and Affect: Mood normal.     
   Behavior: Behavior normal.  
 
 
 
 
Diagnostic Study Results Labs - Recent Results (from the past 12 hour(s)) METABOLIC PANEL, COMPREHENSIVE Collection Time: 01/16/20  1:21 PM  
Result Value Ref Range Sodium 125 (L) 136 - 145 mmol/L Potassium 3.8 3.5 - 5.5 mmol/L Chloride 94 (L) 100 - 111 mmol/L  
 CO2 21 21 - 32 mmol/L Anion gap 10 3.0 - 18 mmol/L Glucose 162 (H) 74 - 99 mg/dL BUN 19 (H) 7.0 - 18 MG/DL Creatinine 0.51 (L) 0.6 - 1.3 MG/DL  
 BUN/Creatinine ratio 37 (H) 12 - 20 GFR est AA >60 >60 ml/min/1.73m2 GFR est non-AA >60 >60 ml/min/1.73m2 Calcium 8.7 8.5 - 10.1 MG/DL Bilirubin, total 0.3 0.2 - 1.0 MG/DL  
 ALT (SGPT) 27 13 - 56 U/L  
 AST (SGOT) 33 10 - 38 U/L Alk. phosphatase 130 (H) 45 - 117 U/L Protein, total 6.6 6.4 - 8.2 g/dL Albumin 2.2 (L) 3.4 - 5.0 g/dL Globulin 4.4 (H) 2.0 - 4.0 g/dL A-G Ratio 0.5 (L) 0.8 - 1.7    
CBC WITH AUTOMATED DIFF Collection Time: 01/16/20  1:24 PM  
Result Value Ref Range WBC 23.0 (H) 4.6 - 13.2 K/uL  
 RBC 3.64 (L) 4.20 - 5.30 M/uL  
 HGB 11.9 (L) 12.0 - 16.0 g/dL HCT 33.4 (L) 35.0 - 45.0 % MCV 91.8 74.0 - 97.0 FL  
 MCH 32.7 24.0 - 34.0 PG  
 MCHC 35.6 31.0 - 37.0 g/dL  
 RDW 11.7 11.6 - 14.5 % PLATELET 481 204 - 887 K/uL MPV 9.1 (L) 9.2 - 11.8 FL  
 NEUTROPHILS 69 42 - 75 % BAND NEUTROPHILS 22 (H) 0 - 5 % LYMPHOCYTES 4 (L) 20 - 51 % MONOCYTES 4 2 - 9 % EOSINOPHILS 0 0 - 5 % BASOPHILS 0 0 - 3 % METAMYELOCYTES 1 (H) 0 %  
 ABS. NEUTROPHILS 20.9 (H) 1.8 - 8.0 K/UL  
 ABS. LYMPHOCYTES 0.9 0.8 - 3.5 K/UL  
 ABS. MONOCYTES 0.9 0 - 1.0 K/UL  
 ABS. EOSINOPHILS 0.0 0.0 - 0.4 K/UL  
 ABS. BASOPHILS 0.0 0.0 - 0.06 K/UL  
 DF MANUAL PLATELET COMMENTS ADEQUATE PLATELETS RBC COMMENTS NORMOCYTIC, NORMOCHROMIC    
TROPONIN I Collection Time: 01/16/20  1:24 PM  
Result Value Ref Range Troponin-I, QT <0.02 0.0 - 0.045 NG/ML  
EKG, 12 LEAD, INITIAL Collection Time: 01/16/20  1:30 PM  
Result Value Ref Range Ventricular Rate 132 BPM  
 Atrial Rate 163 BPM  
 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  
leads Confirmed by Sue Pettit MD, Leanna López ([id number]) on 1/16/2020 2:44:56 PM 
  
POC LACTIC ACID Collection Time: 01/16/20  1:32 PM  
Result Value Ref Range Lactic Acid (POC) 0.95 0.40 - 2.00 mmol/L  
URINALYSIS W/ RFLX MICROSCOPIC Collection Time: 01/16/20  2:38 PM  
Result Value Ref Range Color YELLOW Appearance CLOUDY Specific gravity >1.030 (H) 1.005 - 1.030  
 pH (UA) 5.5 5.0 - 8.0 Protein >1,000 (A) NEG mg/dL Glucose NEGATIVE  NEG mg/dL Ketone 40 (A) NEG mg/dL Bilirubin NEGATIVE  NEG Blood MODERATE (A) NEG Urobilinogen 1.0 0.2 - 1.0 EU/dL Nitrites NEGATIVE  NEG Leukocyte Esterase TRACE (A) NEG    
INFLUENZA A & B AG (RAPID TEST) Collection Time: 01/16/20  2:38 PM  
Result Value Ref Range Influenza A Antigen NEGATIVE  NEG Influenza B Antigen NEGATIVE  NEG    
URINE MICROSCOPIC ONLY Collection Time: 01/16/20  2:38 PM  
Result Value Ref Range WBC 10 to 15 0 - 4 /hpf  
 RBC 10 to 15 0 - 5 /hpf Epithelial cells 3+ 0 - 5 /lpf Bacteria 4+ (A) NEG /hpf Radiologic Studies -  
XR CHEST PORT Final Result Impression:  
1. Interstitial pneumonitis. Follow-up to resolution is recommended. Medical Decision Making I am the first provider for this patient. I reviewed the vital signs, available nursing notes, past medical history, past surgical history, family history and social history. Vital Signs-Reviewed the patient's vital signs. EKG: afib with rvr Records Reviewed: Nursing Notes and Old Medical Records (Time of Review: 1:21 PM) 
 
ED Course: Progress Notes, Reevaluation, and Consults: 
 
  
 
Provider Notes (Medical Decision Making): MDM 
81 y/o F with hx of afib in the past presenting with SOB found to be in afib with rvr with cough for 2 weeks, pneumonitis vs b/l PNA on xray and possibly sepsis given wbc of >20. Patient stable on arrival, given 1L NS but did not give further fluids per sepsis protocol because I did not want to cause fluid overload and pulmonary edema in the setting of afib with rvr. Despite xray not showing lobar PNA there is still clinical concern for pna with possible sepsis so broad spectrum abx for CAP begun via IV. Decision was made to rate control for afib with IV diltiazem, patient not hypotensive and remained on the monitor - given lower bolus dose of 10mg due to concern for sepsis and development of hypotension but patient tolerating dilt well in ED. D/w hospitalist who will admit patient and rec'd therapeutic lovenox for afib if no contraindications - no contraindications identified,patient has nohx of bleeding and HASBLED score of 2. Also found to be hyponatremic, avoiding further IV NS until repeat BMP to avoid too rapid correction, likely due to poor PO intake but she is now eating and drinking in ED. Procedures Critical Care Time:  
 
 
Diagnosis Clinical Impression: 1. Atrial fibrillation, unspecified type ([address] 2. Pneumonia due to infectious organism, unspecified laterality, unspecified part of lung 3. Hyponatremia Disposition: Admit. Follow-up Information None Patient's Medications Start Taking No medications on file Continue Taking AMLODIPINE (NORVASC) 5 MG TABLET    TK 1 T PO QD  
 ASCORBIC ACID, VITAMIN C, (VITAMIN C) 500 MG TABLET    Take  by mouth. ASPIRIN (ASPIRIN) 325 MG TABLET    Take 975 mg by mouth three (3) times daily. [address] 783-45-40 MG-MG-MILLION TAB    Take  by mouth. CARBAMAZEPINE ER (CARBATROL ER) 100 MG CAPSULE    Take 100 mg by mouth two (2) times a day. CHOLECALCIFEROL (VITAMIN D3) 2,000 UNIT CAP CAPSULE    Take  by mouth two (2) times a day. FISH OIL-OMEGA-3 FATTY ACIDS 300-500 MG CAP    Take  by mouth. LORAZEPAM (ATIVAN) 0.5 MG TABLET    Take  by mouth every eight (8) hours as needed for Anxiety. LOSARTAN (COZAAR) 100 MG TABLET    Take 100 mg by mouth daily. MONTELUKAST (SINGULAIR) 10 MG TABLET    Take 10 mg by mouth as needed. MULTIVIT-MIN/IRON/FOLIC/LUTEIN (CENTRUM SILVER WOMEN PO)    Take  by mouth. VITAMIN E (AQUA GEMS) 400 UNIT CAPSULE    Take  by mouth daily. These Medications have changed No medications on file Stop Taking ATORVASTATIN (LIPITOR) 20 MG TABLET    Take 10 mg by mouth daily. Disclaimer: Sections of this note are dictated using utilizing voice recognition software. Minor typographical errors may be present. If questions arise, please do not hesitate to contact me or call our department. 4:52 PM 
I have personally seen and examined this patient. I have fully participated in the care of this patient. I have reviewed all pertinent clinical information, including history, physical exam and plan. Physical exam was performed by the [id number] under my direct supervision. I, as the supervising physician, was at the bedside to perform a direct face-to-face PMH, ROS, HPI, and physical examination of the patient in addition to the resident physician's evaluation.  
 
Physician comments: Patient has elevated white count with pneumonia and atrial fibrillation. Patient was admitted to the medicine service. Dictation disclaimer:  Please note that this dictation was completed with [employer], the computer voice recognition software. Quite often unanticipated grammatical, syntax, homophones, and other interpretive errors are inadvertently transcribed by the computer software. Please disregard these errors. Please excuse any errors that have escaped final proofreading. Coding Diagnoses Clinical Impression: 1. Atrial fibrillation, unspecified type ([address] 2. Pneumonia due to infectious organism, unspecified laterality, unspecified part of lung 3. Hyponatremia Disposition Disposition: Admit. Matt Pickard M.D. ROBERT Board Certified Emergency Physician

## 2020-01-17 PROBLEM — J18.9 SEPSIS DUE TO PNEUMONIA (HCC): Status: ACTIVE | Noted: 2020-01-01

## 2020-01-17 PROBLEM — J18.9 CAP (COMMUNITY ACQUIRED PNEUMONIA): Status: ACTIVE | Noted: 2020-01-01

## 2020-01-17 PROBLEM — A41.9 SEPSIS DUE TO PNEUMONIA (HCC): Status: ACTIVE | Noted: 2020-01-01

## 2020-01-17 NOTE — PROGRESS NOTES
*ATTENTION:  This note has been created by a medical student for educational purposes only. Please do not refer to the content of this note for clinical decision-making, billing, or other purposes. Please see attending physicians note to obtain clinical information on this patient. * Intern Admission History and Physical 
[address] *This is a Medical Student Note* Patient: Devin Vidales MRN: [id number]  CSN: [id number] YOB: 1933  Age: 80 y.o. Sex: female Admission Date: 1/16/2020 HPI:  
 
Devin Vidales is a 80 y.o. female with PMH HLD, HTN,, now presenting with complaint of nonproductive cough x 2 weeks. Patient thought it was her allergies acting up and went to see her family doctor Dr. Nany Burgos who diagnosed her with pneumonia. She said the cough was productive for the first week. Patient denied any sick contacts. Patient endorsed poor PO intake and recent 10 lbs weight loss due to decreased appetite from coughing frequently. Patient endorsed SOB with exertion, fever 101F x few days. Patient denied HA, chest pain, palpitations, abd pain, N/V, dysuria or hematuria. Review of Systems: 
General ROS: + fever 101 F x few days,- night sweats, weight gain and+ weight loss (10 lbs) Psychological ROS: negative for - anxiety and depression Ophthalmic ROS: negative for - blurry vision, decreased vision or loss of vision ENT ROS: negative for - headaches, hearing change or visual changes Hematological and Lymphatic ROS: negative for - bruising, jaundice Respiratory ROS:+ cough- nonproductive, hemoptysis, +shortness of breath on exertion, orthopnea, paroxysmal dyspnea, or wheezing Cardiovascular ROS: negative for - chest pain, dyspnea on exertion, edema, loss of consciousness, or palpitations Gastrointestinal ROS: negative for - abdominal pain, blood in stools, change in stools, constipation, diarrhea, hematemesis, melena, nausea/vomiting or swallowing difficulty/pain Genito-Urinary ROS: negative for - dysuria, hematuria or urinary frequency/urgency Musculoskeletal ROS: negative for - joint pain, joint swelling or muscle pain Neurological ROS: negative for - dizziness, headaches, numbness/tingling or weakness Dermatological ROS: negative for - rash or skin lesion changes Past Medical History:  
Diagnosis Date  Dyslipidemia  H/O echocardiogram 07/2018 EF 65%, thickening of the mitral valve leaflets with annular calcification, no regurgitation or stenosis  Hypertension  Syncope 07/2018 No past surgical history on file. No family history on file. Social History Socioeconomic History  Marital status:  Spouse name: Not on file  Number of children: Not on file  Years of education: Not on file  Highest education level: Not on file Tobacco Use  Smoking status: Never Smoker  Smokeless tobacco: Never Used Substance and Sexual Activity  Alcohol use: No  
 
 
Allergies Allergen Reactions  Other Food Hives  Ace Inhibitors Unknown (comments)  Chlorhexidine Other (comments) Hives and swelling on contact  Ciprofloxacin Unknown (comments)  Hibiclens [Chlorhexidine Gluconate] Rash  Lisinopril Cough  Lyrica [Pregabalin] Unknown (comments)  Macrobid [Nitrofurantoin Monohyd/M-Cryst] Unknown (comments)  Minoxidil Swelling  Neurontin [Gabapentin] Unknown (comments)  Nitrofurantoin Unknown (comments) Nerve pain  Other Medication Hives Most blood pressure meds  Sulfa (Sulfonamide Antibiotics) Unknown (comments)  Tenex [Guanfacine] Unknown (comments)  Zocor [Simvastatin] Unknown (comments) Prior to Admission Medications Prescriptions Last Dose Informant Patient Reported? Taking? AZO CRANBERRY 509-77-34 mg-mg-million tab   Yes No  
Sig: Take  by mouth.   
LORazepam (ATIVAN) 0.5 mg tablet   Yes No  
 Sig: Take  by mouth every eight (8) hours as needed for Anxiety. amLODIPine (NORVASC) 5 mg tablet   Yes No  
Sig: TK 1 T PO QD  
ascorbic acid, vitamin C, (VITAMIN C) 500 mg tablet   Yes No  
Sig: Take  by mouth. aspirin (ASPIRIN) 325 mg tablet   Yes No  
8 x day. carBAMazepine ER (CARBATROL ER) 100 mg capsule   Yes No  
Sig: Take 100 mg by mouth two (2) times a day. cholecalciferol (VITAMIN D3) 2,000 unit cap capsule   Yes No  
Sig: Take  by mouth two (2) times a day. fish oil-omega-3 fatty acids 300-500 mg cap   Yes No  
Sig: Take  by mouth.  
losartan (COZAAR) 100 mg tablet   Yes No  
Sig: Take 100 mg by mouth daily. montelukast (SINGULAIR) 10 mg tablet   Yes No  
Sig: Take 10 mg by mouth as needed. multivit-min/iron/folic/lutein (CENTRUM SILVER WOMEN PO)   Yes No  
Sig: Take  by mouth.  
vitamin E (AQUA GEMS) 400 unit capsule   Yes No  
Sig: Take  by mouth daily. Facility-Administered Medications: None Physical Exam:  
 
Patient Vitals for the past 24 hrs: 
 Temp Pulse Resp BP SpO2  
01/16/20 2300 98.3 °F (36.8 °C) (!) 121 28 (!) 154/135 93 % 01/16/20 2200  (!) 114 26 119/61 94 % 01/16/20 2150 99 °F (37.2 °C) (!) 120 30 152/71 94 % 01/16/20 2100  (!) 124 27  94 % 01/16/20 2026  (!) 125 25  91 % 01/16/20 2000  (!) 126 (!) 34 140/87 93 % 01/16/20 1930  (!) 133 27 (!) 154/96 91 % 01/16/20 1900  (!) 142 28 (!) 111/92 93 % 01/16/20 1800  (!) 138 (!) 44 130/72 92 % 01/16/20 1758  (!) 135 (!) 34 159/83 92 % 01/16/20 1730  (!) 132 (!) 42 146/79 93 % 01/16/20 1700     96 % 01/16/20 1500  (!) 140 29 149/76   
01/16/20 1419  (!) 129  (!) 144/97   
01/16/20 1400  (!) 139 (!) 41 (!) 144/97   
01/16/20 1250 98.6 °F (37 °C) (!) 141 25 (!) 133/93 95 % Physical Exam: 
General:  AAOx3, NAD, ill appearing HEENT: Conjunctiva pink, sclera anicteric. PERRL. EOMI. Pharynx moist, nonerythematous. Moist mucous membranes.   Thyroid not enlarged, no nodules. No cervical, supraclavicular, occipital or submandibular lymphadenopathy. No other gross abnormalities present. CV:  Irregularly irregular rhythm, tachycardic, no murmurs. No visible pulsations or thrills. RESP:  Unlabored breathing. Lungs ausculation diffuse wheezing. Equal expansion bilaterally. ABD:  Soft, nontender, nondistended. BS (+). No hepatosplenomegaly. No suprapubic tenderness. MS:  No joint deformity or instability. No atrophy. Neuro:  CN II-XII grossly intact. 5/5 strength bilateral upper extremities and lower extremities. Ext:  No edema. 2+ radial and dp pulses bilaterally. Skin:  No rashes, lesions, or ulcers. Good turgor. Chemistry Recent Labs  
  01/16/20 
1321 * * K 3.8 CL 94* CO2 21 BUN 19* CREA 0.51* CA 8.7 AGAP 10 BUCR 37* * TP 6.6 ALB 2.2*  
GLOB 4.4* AGRAT 0.5* CBC w/Diff Recent Labs  
  01/16/20 
1324 WBC 23.0*  
RBC 3.64* HGB 11.9*  
HCT 33.4*  
 GRANS 69 LYMPH 4*  
EOS 0 Liver Enzymes Protein, total  
Date Value Ref Range Status 01/16/2020 6.6 6.4 - 8.2 g/dL Final  
 
Albumin Date Value Ref Range Status 01/16/2020 2.2 (L) 3.4 - 5.0 g/dL Final  
 
Globulin Date Value Ref Range Status 01/16/2020 4.4 (H) 2.0 - 4.0 g/dL Final  
 
A-G Ratio Date Value Ref Range Status 01/16/2020 0.5 (L) 0.8 - 1.7   Final  
 
AST (SGOT) Date Value Ref Range Status 01/16/2020 33 10 - 38 U/L Final  
 
Alk. phosphatase Date Value Ref Range Status 01/16/2020 130 (H) 45 - 117 U/L Final  
 
Recent Labs  
  01/16/20 
1321 TP 6.6 ALB 2.2*  
GLOB 4.4* AGRAT 0.5* SGOT 33 * Lactic Acid No results found for: LAC No results for input(s): LAC in the last 72 hours. BNP No results found for: BNP, BNPP, XBNPT Cardiac Enzymes Lab Results Component Value Date/Time  Jarrell Gregg <0.02 01/16/2020 01:24 PM  
  
 
 Coagulation No results for input(s): PTP, INR, APTT, INREXT, INREXT in the last 72 hours. Thyroid  Lab Results Component Value Date/Time TSH 3.94 (H) 07/18/2018 03:05 AM  
    
 
Lipid Panel No results found for: CHOL, CHOLPOCT, CHOLX, CHLST, CHOLV, 013748, HDL, HDLP, LDL, LDLC, DLDLP, 268432, VLDLC, VLDL, TGLX, TRIGL, TRIGP, TGLPOCT, CHHD, CHHDX  
 
ABG No results for input(s): PHI, PHI, POC2, PCO2I, PO2, PO2I, HCO3, HCO3I, FIO2, FIO2I in the last 72 hours. Urinalysis Lab Results Component Value Date/Time Color YELLOW 01/16/2020 02:38 PM  
 Appearance CLOUDY 01/16/2020 02:38 PM  
 Specific gravity >1.030 (H) 01/16/2020 02:38 PM  
 pH (UA) 5.5 01/16/2020 02:38 PM  
 Protein >1,000 (A) 01/16/2020 02:38 PM  
 Glucose NEGATIVE  01/16/2020 02:38 PM  
 Ketone 40 (A) 01/16/2020 02:38 PM  
 Bilirubin NEGATIVE  01/16/2020 02:38 PM  
 Urobilinogen 1.0 01/16/2020 02:38 PM  
 Nitrites NEGATIVE  01/16/2020 02:38 PM  
 Leukocyte Esterase TRACE (A) 01/16/2020 02:38 PM  
 Epithelial cells 3+ 01/16/2020 02:38 PM  
 Bacteria 4+ (A) 01/16/2020 02:38 PM  
 WBC 10 to 15 01/16/2020 02:38 PM  
 RBC 10 to 15 01/16/2020 02:38 PM  
  
 
Micro No results for input(s): SDES, CULT in the last 72 hours. No results for input(s): CULT in the last 72 hours. Imaging: 
XR (Most Recent). Results from [facility] Encounter encounter on 01/16/20 XR CHEST PORT Narrative EXAM: Chest Radiograph INDICATION:  cough TECHNIQUE: AP view of the chest 
 
COMPARISON: 7/17/2018 and 11/6/2007 FINDINGS: No pneumothorax identified. Mild interstitial infiltrates are noted. No effusions identified. The cardiomediastinal silhouette is unremarkable. The pulmonary vasculature is unremarkable. The osseous structures are 
unremarkable. Impression Impression: 
1. Interstitial pneumonitis. Follow-up to resolution is recommended. CT (Most Recent) Results from [facility] Encounter encounter on 07/17/18 CT HEAD WO CONT Narrative CT HEAD WITHOUT IV CONTRAST INDICATION: Syncope/fainting. COMPARISON: CT head 2/27/2018. TECHNIQUE: Serial axial CT images were obtained from the skull vertex to foramen 
magnum without IV contrast. All CT scans at this facility are performed using 
dose optimization technique as appropriate to a performed exam, to include 
automated exposure control, adjustment of the mA and/or kV according to 
patient's size (including appropriate matching for site-specific examinations), 
or use of iterative reconstruction technique. FINDINGS: 
Visualized paranasal sinuses are free from significant mucosal disease. Kim Taylor Buck-white matter differentiation appears preserved. Moderate 
periventricular/cerebral white matter hypoattenuation, grossly unchanged. .No 
evidence for acute intracranial hemorrhage, acute infarct, or mass lesion. No 
evidence for hydrocephalus. . The calvarium appears intact. Impression IMPRESSION: 
No evidence for acute intracranial process. Kim Taylor Moderate white matter disease, presumed chronic ischemic. A preliminary reading was placed in PACS by Dr. Natalie Miguel at [phone] hours 7/18/2018. ECHO No results found for this or any previous visit. EKG Results for orders placed or performed in visit on 06/06/19 AMB POC EKG ROUTINE W/ 12 LEADS, INTER & REP     Status: None Impression See progress note. Recent Results (from the past 12 hour(s)) URINALYSIS W/ RFLX MICROSCOPIC Collection Time: 01/16/20  2:38 PM  
Result Value Ref Range Color YELLOW Appearance CLOUDY Specific gravity >1.030 (H) 1.005 - 1.030  
 pH (UA) 5.5 5.0 - 8.0 Protein >1,000 (A) NEG mg/dL Glucose NEGATIVE  NEG mg/dL Ketone 40 (A) NEG mg/dL Bilirubin NEGATIVE  NEG Blood MODERATE (A) NEG Urobilinogen 1.0 0.2 - 1.0 EU/dL Nitrites NEGATIVE  NEG Leukocyte Esterase TRACE (A) NEG    
INFLUENZA A & B AG (RAPID TEST)  Collection Time: 01/16/20  2:38 PM  
 Result Value Ref Range Influenza A Antigen NEGATIVE  NEG Influenza B Antigen NEGATIVE  NEG    
URINE MICROSCOPIC ONLY Collection Time: 01/16/20  2:38 PM  
Result Value Ref Range WBC 10 to 15 0 - 4 /hpf  
 RBC 10 to 15 0 - 5 /hpf Epithelial cells 3+ 0 - 5 /lpf Bacteria 4+ (A) NEG /hpf Assessment/Plan:  
80 y.o. female with PMH HLD, HTN, SLE now admitted with sepsis likely secondary to pneumonia. 1. Sepsis likely 2/2 CAP. DDx to include aspiration pneumonia, UTI 
-In ED, SIRS (3/4) criteria afebrile, HR 130s, RR 30-40s, WBC 23 
-Lactic acid 0.95 
-Influenza A/B antigen negative 
-CXR: Interstitial pneumonitis. No pneumothorax identified. Mild interstitial infiltrates are noted. No effusions identified. The pulmonary vasculature is unremarkable.  
-Admit to floor with tele 
-Cardiac Diet 
-Daily BMP, CBC w/ diff, Mg 
-Daily weights, vital signs, strict I&Os 
-Consult PT/OT, cardiology [] F/u blood culture x2 
[] Continue azithromycin and ceftriaxone IV (Day 1/5) [] Start Robitussin for cough 2. Hyponatremia, asymptomatic- possibly due to Poor PO intake vs SIADH 
-Admission Na 125 
-Encourage PO intake 
-Monitor BMP 3. Paroxysmal Afib with RVR 
-In ED, irregularly irregular rhythm, Tachycardic HR 130s -EKG: Afib with RVR, old anterior infarct,  
-CHADSVASC 2 
-ED gave therapeutic dose of Lovenox for anticoagulation 
[] Start Cardizem 100 mg in 0.9% sodium chloride 4. UTI- asymptomatic 
-UA: negative nitrates, leukocyte esterase, Bacteria 4+ 
-Abx: Ceftriaxone and azithromycin cross-cover for UTI Chronic Conditions: 
5. HTN 
-continue home amlodipine 6. HLD 
-continue home atorvastatin 7. Trigeminal neuralgia 
-continue home carbamazepine 8. Anxiety 
-continue home lorazepam  
 
9. SLE  
-continue home vitamins For additional problem list, assessment, and plan please see intern note. Diet Regular DVT Prophylaxis Lovenox GI Prophylaxis -  
 Code status Partial  
Disposition Admission Point of Contact Prentis Cords Relationship: Daughter 
[phone] Melchor Krabbe [address] January 17, 2020, 12:46 AM

## 2020-01-17 NOTE — PROGRESS NOTES
*ATTENTION:  This note has been created by a medical student for educational purposes only. Please do not refer to the content of this note for clinical decision-making, billing, or other purposes. Please see attending physicians note to obtain clinical information on this patient. * Intern Admission History and Physical 
[address] *This is a Medical Student Note* Patient: Sebas Peña MRN: [id number]  CSN: [id number] YOB: 1933  Age: 80 y.o. Sex: female Admission Date: 1/16/2020 HPI:  
 
Sebas Peña is a 80 y.o. female with PMH HLD, HTN,, now presenting with complaint of nonproductive cough x 2 weeks. Patient thought it was her allergies acting up and went to see her family doctor Dr. Foreign Bravo who diagnosed her with pneumonia. She said the cough was productive for the first week. Patient denied any sick contacts. Patient endorsed poor PO intake and recent 10 lbs weight loss due to decreased appetite from coughing frequently. Patient endorsed SOB with exertion, fever 101F x few days. Patient denied HA, chest pain, palpitations, abd pain, N/V, dysuria or hematuria. Review of Systems: 
General ROS: + fever 101 F x few days,- night sweats, weight gain and+ weight loss (10 lbs) Psychological ROS: negative for - anxiety and depression Ophthalmic ROS: negative for - blurry vision, decreased vision or loss of vision ENT ROS: negative for - headaches, hearing change or visual changes Hematological and Lymphatic ROS: negative for - bruising, jaundice Respiratory ROS:+ cough- nonproductive, hemoptysis, +shortness of breath on exertion, orthopnea, paroxysmal dyspnea, or wheezing Cardiovascular ROS: negative for - chest pain, dyspnea on exertion, edema, loss of consciousness, or palpitations Gastrointestinal ROS: negative for - abdominal pain, blood in stools, change in stools, constipation, diarrhea, hematemesis, melena, nausea/vomiting or swallowing difficulty/pain Genito-Urinary ROS: negative for - dysuria, hematuria or urinary frequency/urgency Musculoskeletal ROS: negative for - joint pain, joint swelling or muscle pain Neurological ROS: negative for - dizziness, headaches, numbness/tingling or weakness Dermatological ROS: negative for - rash or skin lesion changes Past Medical History:  
Diagnosis Date  Dyslipidemia  H/O echocardiogram 07/2018 EF 65%, thickening of the mitral valve leaflets with annular calcification, no regurgitation or stenosis  Hypertension  Syncope 07/2018 No past surgical history on file. No family history on file. Social History Socioeconomic History  Marital status:  Spouse name: Not on file  Number of children: Not on file  Years of education: Not on file  Highest education level: Not on file Tobacco Use  Smoking status: Never Smoker  Smokeless tobacco: Never Used Substance and Sexual Activity  Alcohol use: No  
 
 
Allergies Allergen Reactions  Other Food Hives  Ace Inhibitors Unknown (comments)  Chlorhexidine Other (comments) Hives and swelling on contact  Ciprofloxacin Unknown (comments)  Hibiclens [Chlorhexidine Gluconate] Rash  Lisinopril Cough  Lyrica [Pregabalin] Unknown (comments)  Macrobid [Nitrofurantoin Monohyd/M-Cryst] Unknown (comments)  Minoxidil Swelling  Neurontin [Gabapentin] Unknown (comments)  Nitrofurantoin Unknown (comments) Nerve pain  Other Medication Hives Most blood pressure meds  Sulfa (Sulfonamide Antibiotics) Unknown (comments)  Tenex [Guanfacine] Unknown (comments)  Zocor [Simvastatin] Unknown (comments) Prior to Admission Medications Prescriptions Last Dose Informant Patient Reported? Taking? AZO CRANBERRY [id number] mg-mg-million tab   Yes No  
Sig: Take  by mouth.   
LORazepam (ATIVAN) 0.5 mg tablet   Yes No  
 Sig: Take  by mouth every eight (8) hours as needed for Anxiety. amLODIPine (NORVASC) 5 mg tablet   Yes No  
Sig: TK 1 T PO QD  
ascorbic acid, vitamin C, (VITAMIN C) 500 mg tablet   Yes No  
Sig: Take  by mouth. aspirin (ASPIRIN) 325 mg tablet   Yes No  
8 x day. carBAMazepine ER (CARBATROL ER) 100 mg capsule   Yes No  
Sig: Take 100 mg by mouth two (2) times a day. cholecalciferol (VITAMIN D3) 2,000 unit cap capsule   Yes No  
Sig: Take  by mouth two (2) times a day. fish oil-omega-3 fatty acids 300-500 mg cap   Yes No  
Sig: Take  by mouth.  
losartan (COZAAR) 100 mg tablet   Yes No  
Sig: Take 100 mg by mouth daily. montelukast (SINGULAIR) 10 mg tablet   Yes No  
Sig: Take 10 mg by mouth as needed. multivit-min/iron/folic/lutein (CENTRUM SILVER WOMEN PO)   Yes No  
Sig: Take  by mouth.  
vitamin E (AQUA GEMS) 400 unit capsule   Yes No  
Sig: Take  by mouth daily. Facility-Administered Medications: None Physical Exam:  
 
Patient Vitals for the past 24 hrs: 
 Temp Pulse Resp BP SpO2  
01/16/20 2300 98.3 °F (36.8 °C) (!) 121 28 (!) 154/135 93 % 01/16/20 2200  (!) 114 26 119/61 94 % 01/16/20 2150 99 °F (37.2 °C) (!) 120 30 152/71 94 % 01/16/20 2100  (!) 124 27  94 % 01/16/20 2026  (!) 125 25  91 % 01/16/20 2000  (!) 126 (!) 34 140/87 93 % 01/16/20 1930  (!) 133 27 (!) 154/96 91 % 01/16/20 1900  (!) 142 28 (!) 111/92 93 % 01/16/20 1800  (!) 138 (!) 44 130/72 92 % 01/16/20 1758  (!) 135 (!) 34 159/83 92 % 01/16/20 1730  (!) 132 (!) 42 146/79 93 % 01/16/20 1700     96 % 01/16/20 1500  (!) 140 29 149/76   
01/16/20 1419  (!) 129  (!) 144/97   
01/16/20 1400  (!) 139 (!) 41 (!) 144/97   
01/16/20 1250 98.6 °F (37 °C) (!) 141 25 (!) 133/93 95 % Physical Exam: 
General:  AAOx3, NAD, ill appearing HEENT: Conjunctiva pink, sclera anicteric. PERRL. EOMI. Pharynx moist, nonerythematous. Moist mucous membranes.   Thyroid not enlarged, no nodules. No cervical, supraclavicular, occipital or submandibular lymphadenopathy. No other gross abnormalities present. CV:  Irregularly irregular rhythm, tachycardic, no murmurs. No visible pulsations or thrills. RESP:  Unlabored breathing. Lungs ausculation diffuse wheezing. Equal expansion bilaterally. ABD:  Soft, nontender, nondistended. BS (+). No hepatosplenomegaly. No suprapubic tenderness. MS:  No joint deformity or instability. No atrophy. Neuro:  CN II-XII grossly intact. 5/5 strength bilateral upper extremities and lower extremities. Ext:  No edema. 2+ radial and dp pulses bilaterally. Skin:  No rashes, lesions, or ulcers. Good turgor. Chemistry Recent Labs  
  01/16/20 
1321 * * K 3.8 CL 94* CO2 21 BUN 19* CREA 0.51* CA 8.7 AGAP 10 BUCR 37* * TP 6.6 ALB 2.2*  
GLOB 4.4* AGRAT 0.5* CBC w/Diff Recent Labs  
  01/16/20 
1324 WBC 23.0*  
RBC 3.64* HGB 11.9*  
HCT 33.4*  
 GRANS 69 LYMPH 4*  
EOS 0 Liver Enzymes Protein, total  
Date Value Ref Range Status 01/16/2020 6.6 6.4 - 8.2 g/dL Final  
 
Albumin Date Value Ref Range Status 01/16/2020 2.2 (L) 3.4 - 5.0 g/dL Final  
 
Globulin Date Value Ref Range Status 01/16/2020 4.4 (H) 2.0 - 4.0 g/dL Final  
 
A-G Ratio Date Value Ref Range Status 01/16/2020 0.5 (L) 0.8 - 1.7   Final  
 
AST (SGOT) Date Value Ref Range Status 01/16/2020 33 10 - 38 U/L Final  
 
Alk. phosphatase Date Value Ref Range Status 01/16/2020 130 (H) 45 - 117 U/L Final  
 
Recent Labs  
  01/16/20 
1321 TP 6.6 ALB 2.2*  
GLOB 4.4* AGRAT 0.5* SGOT 33 * Lactic Acid No results found for: LAC No results for input(s): LAC in the last 72 hours. BNP No results found for: BNP, BNPP, XBNPT Cardiac Enzymes Lab Results Component Value Date/Time  Rafael Anon <0.02 01/16/2020 01:24 PM  
  
 
 Coagulation No results for input(s): PTP, INR, APTT, INREXT in the last 72 hours. Thyroid  Lab Results Component Value Date/Time TSH 3.94 (H) 07/18/2018 03:05 AM  
    
 
Lipid Panel No results found for: CHOL, CHOLPOCT, CHOLX, CHLST, CHOLV, 780433, HDL, HDLP, LDL, LDLC, DLDLP, 298477, VLDLC, VLDL, TGLX, TRIGL, TRIGP, TGLPOCT, CHHD, CHHDX  
 
ABG No results for input(s): PHI, PHI, POC2, PCO2I, PO2, PO2I, HCO3, HCO3I, FIO2, FIO2I in the last 72 hours. Urinalysis Lab Results Component Value Date/Time Color YELLOW 01/16/2020 02:38 PM  
 Appearance CLOUDY 01/16/2020 02:38 PM  
 Specific gravity >1.030 (H) 01/16/2020 02:38 PM  
 pH (UA) 5.5 01/16/2020 02:38 PM  
 Protein >1,000 (A) 01/16/2020 02:38 PM  
 Glucose NEGATIVE  01/16/2020 02:38 PM  
 Ketone 40 (A) 01/16/2020 02:38 PM  
 Bilirubin NEGATIVE  01/16/2020 02:38 PM  
 Urobilinogen 1.0 01/16/2020 02:38 PM  
 Nitrites NEGATIVE  01/16/2020 02:38 PM  
 Leukocyte Esterase TRACE (A) 01/16/2020 02:38 PM  
 Epithelial cells 3+ 01/16/2020 02:38 PM  
 Bacteria 4+ (A) 01/16/2020 02:38 PM  
 WBC 10 to 15 01/16/2020 02:38 PM  
 RBC 10 to 15 01/16/2020 02:38 PM  
  
 
Micro No results for input(s): SDES, CULT in the last 72 hours. No results for input(s): CULT in the last 72 hours. Imaging: 
XR (Most Recent). Results from [facility] [place] Encounter encounter on 01/16/20 XR CHEST PORT Narrative EXAM: Chest Radiograph INDICATION:  cough TECHNIQUE: AP view of the chest 
 
COMPARISON: 7/17/2018 and 11/6/2007 FINDINGS: No pneumothorax identified. Mild interstitial infiltrates are noted. No effusions identified. The cardiomediastinal silhouette is unremarkable. The pulmonary vasculature is unremarkable. The osseous structures are 
unremarkable. Impression Impression: 
1. Interstitial pneumonitis. Follow-up to resolution is recommended. CT (Most Recent) Results from [facility] Encounter encounter on 07/17/18 CT HEAD WO CONT Narrative CT HEAD WITHOUT IV CONTRAST INDICATION: Syncope/fainting. COMPARISON: CT head 2/27/2018. TECHNIQUE: Serial axial CT images were obtained from the skull vertex to foramen 
magnum without IV contrast. All CT scans at this facility are performed using 
dose optimization technique as appropriate to a performed exam, to include 
automated exposure control, adjustment of the mA and/or kV according to 
patient's size (including appropriate matching for site-specific examinations), 
or use of iterative reconstruction technique. FINDINGS: 
Visualized paranasal sinuses are free from significant mucosal disease. Lissy Chavez Buck-white matter differentiation appears preserved. Moderate 
periventricular/cerebral white matter hypoattenuation, grossly unchanged. .No 
evidence for acute intracranial hemorrhage, acute infarct, or mass lesion. No 
evidence for hydrocephalus. . The calvarium appears intact. Impression IMPRESSION: 
No evidence for acute intracranial process. [place] Chavez Moderate white matter disease, presumed chronic ischemic. A preliminary reading was placed in PACS by Dr. Radha Mccartney at [phone] hours 7/18/2018. ECHO No results found for this or any previous visit. EKG Results for orders placed or performed in visit on 06/06/19 AMB POC EKG ROUTINE W/ 12 LEADS, INTER & REP     Status: None Impression See progress note. Recent Results (from the past 12 hour(s)) METABOLIC PANEL, COMPREHENSIVE Collection Time: 01/16/20  1:21 PM  
Result Value Ref Range Sodium 125 (L) 136 - 145 mmol/L Potassium 3.8 3.5 - 5.5 mmol/L Chloride 94 (L) 100 - 111 mmol/L  
 CO2 21 21 - 32 mmol/L Anion gap 10 3.0 - 18 mmol/L Glucose 162 (H) 74 - 99 mg/dL BUN 19 (H) 7.0 - 18 MG/DL Creatinine 0.51 (L) 0.6 - 1.3 MG/DL  
 BUN/Creatinine ratio 37 (H) 12 - 20 GFR est AA >60 >60 ml/min/1.73m2 GFR est non-AA >60 >60 ml/min/1.73m2  Calcium 8.7 8.5 - 10.1 MG/DL  
 Bilirubin, total 0.3 0.2 - 1.0 MG/DL  
 ALT (SGPT) 27 13 - 56 U/L  
 AST (SGOT) 33 10 - 38 U/L Alk. phosphatase 130 (H) 45 - 117 U/L Protein, total 6.6 6.4 - 8.2 g/dL Albumin 2.2 (L) 3.4 - 5.0 g/dL Globulin 4.4 (H) 2.0 - 4.0 g/dL A-G Ratio 0.5 (L) 0.8 - 1.7    
CBC WITH AUTOMATED DIFF Collection Time: 01/16/20  1:24 PM  
Result Value Ref Range WBC 23.0 (H) 4.6 - 13.2 K/uL  
 RBC 3.64 (L) 4.20 - 5.30 M/uL  
 HGB 11.9 (L) 12.0 - 16.0 g/dL HCT 33.4 (L) 35.0 - 45.0 % MCV 91.8 74.0 - 97.0 FL  
 MCH 32.7 24.0 - 34.0 PG  
 MCHC 35.6 31.0 - 37.0 g/dL  
 RDW 11.7 11.6 - 14.5 % PLATELET 128 028 - 287 K/uL MPV 9.1 (L) 9.2 - 11.8 FL  
 NEUTROPHILS 69 42 - 75 % BAND NEUTROPHILS 22 (H) 0 - 5 % LYMPHOCYTES 4 (L) 20 - 51 % MONOCYTES 4 2 - 9 % EOSINOPHILS 0 0 - 5 % BASOPHILS 0 0 - 3 % METAMYELOCYTES 1 (H) 0 %  
 ABS. NEUTROPHILS 20.9 (H) 1.8 - 8.0 K/UL  
 ABS. LYMPHOCYTES 0.9 0.8 - 3.5 K/UL  
 ABS. MONOCYTES 0.9 0 - 1.0 K/UL  
 ABS. EOSINOPHILS 0.0 0.0 - 0.4 K/UL  
 ABS. BASOPHILS 0.0 0.0 - 0.06 K/UL  
 DF MANUAL PLATELET COMMENTS ADEQUATE PLATELETS    
 RBC COMMENTS NORMOCYTIC, NORMOCHROMIC    
TROPONIN I Collection Time: 01/16/20  1:24 PM  
Result Value Ref Range Troponin-I, QT <0.02 0.0 - 0.045 NG/ML  
EKG, 12 LEAD, INITIAL Collection Time: 01/16/20  1:30 PM  
Result Value Ref Range Ventricular Rate 132 BPM  
 Atrial Rate 163 BPM  
 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  
leads Confirmed by Pavithra Louis MD, Emiliano Gant ([id number]) on 1/16/2020 2:44:56 PM 
  
POC LACTIC ACID  
 Collection Time: 01/16/20  1:32 PM  
Result Value Ref Range Lactic Acid (POC) 0.95 0.40 - 2.00 mmol/L  
URINALYSIS W/ RFLX MICROSCOPIC Collection Time: 01/16/20  2:38 PM  
Result Value Ref Range Color YELLOW Appearance CLOUDY Specific gravity >1.030 (H) 1.005 - 1.030  
 pH (UA) 5.5 5.0 - 8.0 Protein >1,000 (A) NEG mg/dL Glucose NEGATIVE  NEG mg/dL Ketone 40 (A) NEG mg/dL Bilirubin NEGATIVE  NEG Blood MODERATE (A) NEG Urobilinogen 1.0 0.2 - 1.0 EU/dL Nitrites NEGATIVE  NEG Leukocyte Esterase TRACE (A) NEG    
INFLUENZA A & B AG (RAPID TEST) Collection Time: 01/16/20  2:38 PM  
Result Value Ref Range Influenza A Antigen NEGATIVE  NEG Influenza B Antigen NEGATIVE  NEG    
URINE MICROSCOPIC ONLY Collection Time: 01/16/20  2:38 PM  
Result Value Ref Range WBC 10 to 15 0 - 4 /hpf  
 RBC 10 to 15 0 - 5 /hpf Epithelial cells 3+ 0 - 5 /lpf Bacteria 4+ (A) NEG /hpf Assessment/Plan:  
80 y.o. female with PMH HLD, HTN, SLE now admitted with sepsis likely secondary to pneumonia. 1. Sepsis likely 2/2 CAP. DDx to include aspiration pneumonia, UTI 
-In ED, SIRS (3/4) criteria afebrile, HR 130s, RR 30-40s, WBC 23 
-Lactic acid 0.95 
-Influenza A/B antigen negative 
-CXR: Interstitial pneumonitis. No pneumothorax identified. Mild interstitial infiltrates are noted. No effusions identified. The pulmonary vasculature is unremarkable.  
-Admit to floor 
-Regular Diet 
-Daily BMP, CBC w/ diff, Mg 
-Daily weights, vital signs, strict I&Os 
-Consult PT/OT, cardiology [] F/u blood culture  
[] Continue azithromycin and ceftriaxone IV (Day 1/5) [] Start Robitussin for cough 2. Hyponatremia possibly due to Poor PO intake vs SIADH 
-Admission Na 125 
-Encourage PO intake 3. Paroxysmal Afib with RVR 
-In ED, irregularly irregular rhythm, Tachycardic HR 130s -EKG: Afib with RVR, old anterior infarct,  [] Start Cardizem 100 mg in 0.9% sodium chloride 4. UTI 
-UA: negative nitrates, leukocyte esterase, Bacteria 4+ 
-Abx: Ceftriaxone and azithromycin cross-cover for UTI Chronic Conditions: 
5. HTN 
-continue home amlodipine 6. HLD 
-continue home atorvastatin 7. Trigeminal neuralgia 
-continue home carbamazepine 8. Anxiety 
-continue home lorazepam  
 
9. SLE with lupus nephritis 
-continue home vitamins For additional problem list, assessment, and plan please see intern note. Diet Regular DVT Prophylaxis Lovenox GI Prophylaxis - Code status Partial  
Disposition Admission Point of Contact Mariela Her Relationship: Daughter 
[phone] Laureenabigail Elijah Villalobos January 17, 2020, 12:46 AM

## 2020-01-17 NOTE — CONSULTS
[facility] - Consult Note Consultation request by Elizabeth Escobar MD for advice/opinion related to evaluating A-fib [facility]) [I48.91] Pneumonia [J18.9] Hyponatremia [E87.1] CAP (community acquired pneumonia) [J18.9] Date of  Admission: 1/16/2020 12:48 PM  
Primary Care Physician:  Elida Boyd MD 
 
 Assessment:  
 
Patient Active Problem List  
Diagnosis Code  Syncope R55  
 HTN (hypertension) I10  
 Trigeminal neuralgia G50.0  SLE (systemic lupus erythematosus) (HCC) M32.9  Dyslipidemia E78.5  Hyponatremia E87.1  A-fib ([facility]) I48.91  
 Pneumonia J18.9  
 CAP (community acquired pneumonia) J18.9  Sepsis due to pneumonia ([facility] [address] J18.9, A41.9  
 
 
-Sepsis likely secondary to Community Acquired Pneumonia along with possible UTI. -Atrial fibrillation with RVR. New diagnosis. Likely driven by underlying pulmonary process. -H/o syncope/bradycardia while on BB 7/2018. Patient underwent a pharmacologic nuclear stress test, echo and a 30-day event monitor at that time, all of which were normal.  
-Hypertension. Stable. -Hyperlipidemia. On statin. -SL with lupus nephritis. 
-Chronic HAJI. -Chronic pain, fibromyalgia. 
-Anxiety, depression. Primary cardiologist Dr. Nan Sandhu. Atrial fibrillation CHADSVASC2 score stroke risk:  
80 y.o.                                        > 76        +2  
female                                         Female +1 
CHF hx                                           No    + 0 HTN hx                                           Yes    +1 Stroke/TIA/Thromboembolism       No    +0 Vascular disease hx                       No    + 0 Diabetes Mellitus                            No    + 0 CHADSVASC2 score                    4      Annual Stroke Risk 4.8%- moderate-high Plan:  
 
Would continue cardizem drip. Low dose BB started. Will follow hemodynamic trend. Patient with history of remote syncope/bradycardia.  Would recommend long term oral anticoagulation for stroke prevention. Currently on lovenox. Continue work up and treatment of underlying illness. Will review echocardiogram once complete. History of Present Illness: This is a 80 y.o. female admitted for A-fib [facility]) [I48.91] Pneumonia [J18.9] Hyponatremia [E87.1] CAP (community acquired pneumonia) [J18.9]. Patient complains of:  SOB. Patient went to see PCP yesterday for cough and SOB. Patient was hypoxic and tachycardic. Patient was sent to ER. Patient is admitted with PNA and possible UTI. Patient is found to be in afib with RVR which is a new diagnosis. HR was in 140s with stable BP. Patient was started on cardizem drip. Hemodynamics improving. Cardiac risk factors: dyslipidemia, obesity, hypertension Review of Symptoms:  Except as stated above include: 
Constitutional:  negative Respiratory:  C/o cough, SOB. Cardiovascular:  Denies CP, palp, syncope. Gastrointestinal: negative Genitourinary:  negative Musculoskeletal:  Negative Neurological:  Negative Dermatological:  Negative Endocrinological: Negative Psychological:  Negative Past Medical History:  
 
Past Medical History:  
Diagnosis Date  Dyslipidemia  H/O echocardiogram 07/2018 EF 65%, thickening of the mitral valve leaflets with annular calcification, no regurgitation or stenosis  Hypertension  Syncope 07/2018 Social History:  
 
Social History Socioeconomic History  Marital status:  Spouse name: Not on file  Number of children: Not on file  Years of education: Not on file  Highest education level: Not on file Tobacco Use  Smoking status: Never Smoker  Smokeless tobacco: Never Used Substance and Sexual Activity  Alcohol use: No  
 
 
 Family History: No family history on file. Medications: Allergies Allergen Reactions  Other Food Hives  Ace Inhibitors Unknown (comments)  Chlorhexidine Other (comments) Hives and swelling on contact  Ciprofloxacin Unknown (comments)  Hibiclens [Chlorhexidine Gluconate] Rash  Lisinopril Cough  Lyrica [Pregabalin] Unknown (comments)  Macrobid [Nitrofurantoin Monohyd/M-Cryst] Unknown (comments)  Minoxidil Swelling  Neurontin [Gabapentin] Unknown (comments)  Nitrofurantoin Unknown (comments) Nerve pain  Other Medication Hives Most blood pressure meds  Sulfa (Sulfonamide Antibiotics) Unknown (comments)  Tenex [Guanfacine] Unknown (comments)  Zocor [Simvastatin] Unknown (comments) Current Facility-Administered Medications Medication Dose Route Frequency  albuterol (PROVENTIL HFA, VENTOLIN HFA, PROAIR HFA) inhaler 2 Puff  2 Puff Inhalation Q6H PRN  
 [Held by provider] amLODIPine (NORVASC) tablet 5 mg  5 mg Oral DAILY  ascorbic acid (vitamin C) (VITAMIN C) tablet 500 mg  500 mg Oral DAILY  atorvastatin (LIPITOR) tablet 10 mg  10 mg Oral DAILY  . PHARMACY TO SUBSTITUTE PER PROTOCOL (Reordered from: Dolores Moralez 349-19-09 mg-mg-million tab)    Per Protocol  . PHARMACY TO SUBSTITUTE PER PROTOCOL (Reordered from: carBAMazepine ER (CARBATROL ER) 100 mg capsule)    Per Protocol  cholecalciferol (VITAMIN D3) (400 Units /10 mcg) tablet 2.5 Tab  1,000 Units Oral DAILY  . PHARMACY TO SUBSTITUTE PER PROTOCOL (Reordered from: fish oil-omega-3 fatty acids 300-500 mg cap)    Per Protocol  [Held by provider] LORazepam (ATIVAN) tablet 0.5 mg  0.5 mg Oral TID  [Held by provider] losartan (COZAAR) tablet 100 mg  100 mg Oral DAILY  montelukast (SINGULAIR) tablet 10 mg  10 mg Oral PRN  
 . PHARMACY TO SUBSTITUTE PER PROTOCOL (Reordered from: multivit-min/iron/folic/lutein (CENTRUM SILVER WOMEN PO))    Per Protocol  vitamin E (AQUA GEMS) capsule 400 Units  400 Units Oral DAILY  acetaminophen (TYLENOL) tablet 650 mg  650 mg Oral Q4H PRN  
  metoprolol tartrate (LOPRESSOR) tablet 12.5 mg  12.5 mg Oral Q12H  
 benzonatate (TESSALON) capsule 100 mg  100 mg Oral TID PRN  
 enoxaparin (LOVENOX) injection 60 mg  60 mg SubCUTAneous Q12H  
 0.9% sodium chloride infusion  50 mL/hr IntraVENous CONTINUOUS  
 sodium chloride (NS) flush 5-10 mL  5-10 mL IntraVENous PRN  
 cefTRIAXone (ROCEPHIN) 2 g in sterile water (preservative free) 20 mL IV syringe  2 g IntraVENous Q24H  
 azithromycin (ZITHROMAX) 500 mg in  mL  500 mg IntraVENous Q24H  
 dilTIAZem (CARDIZEM) 100 mg in 0.9% sodium chloride (MBP/ADV) 100 mL infusion  0-15 mg/hr IntraVENous TITRATE  guaiFENesin-codeine (ROBITUSSIN AC) 100-10 mg/5 mL solution 10 mL  10 mL Oral Q4H PRN Current Outpatient Medications Medication Sig  
 albuterol (PROVENTIL HFA, VENTOLIN HFA, PROAIR HFA) 90 mcg/actuation inhaler Take 2 Puffs by inhalation every six (6) hours as needed for Wheezing.  atorvastatin (LIPITOR) 10 mg tablet Take 10 mg by mouth daily.  multivit-min/iron/folic/lutein (CENTRUM SILVER WOMEN PO) Take  by mouth.  fish oil-omega-3 fatty acids 300-500 mg cap Take  by mouth.  vitamin E (AQUA GEMS) 400 unit capsule Take  by mouth daily.  AZO CRANBERRY 212-20-44 mg-mg-million tab Take  by mouth.  ascorbic acid, vitamin C, (VITAMIN C) 500 mg tablet Take  by mouth.  cholecalciferol (VITAMIN D3) 2,000 unit cap capsule Take  by mouth two (2) times a day.  amLODIPine (NORVASC) 5 mg tablet TK 1 T PO QD  
 aspirin (ASPIRIN) 325 mg tablet Take 975 mg by mouth three (3) times daily.  carBAMazepine ER (CARBATROL ER) 100 mg capsule Take 100 mg by mouth two (2) times a day.  LORazepam (ATIVAN) 0.5 mg tablet Take  by mouth every eight (8) hours as needed for Anxiety.  montelukast (SINGULAIR) 10 mg tablet Take 10 mg by mouth as needed.  losartan (COZAAR) 100 mg tablet Take 100 mg by mouth daily. Physical Exam:  
 
Visit Vitals /81 Pulse (!) 105 Temp 97.6 °F (36.4 °C) Resp 23 Ht 4' 10\" (1.473 m) Wt 63.5 kg (140 lb) SpO2 93% BMI 29.26 kg/m² BP Readings from Last 3 Encounters:  
01/17/20 136/81  
06/06/19 142/78  
10/09/18 122/78 Pulse Readings from Last 3 Encounters:  
01/17/20 (!) 105  
06/06/19 71  
10/09/18 88 Wt Readings from Last 3 Encounters:  
01/17/20 63.5 kg (140 lb) 06/06/19 71.2 kg (157 lb) 10/09/18 73 kg (161 lb) General:  alert, cooperative, no distress, appears stated age Neck:  no JVD Lungs:  rhonchi Heart:  irregularly irregular rhythm Abdomen:  abdomen is soft without significant tenderness, masses, organomegaly or guarding Extremities:  extremities normal, atraumatic, no cyanosis or edema Skin: Warm and dry. no hyperpigmentation, vitiligo, or suspicious lesions Neuro: alert, oriented x3, affect appropriate Psych: non focal 
 
 Data Review:  
 
Recent Labs  
  01/17/20 
0530 01/16/20 
1324 WBC 26.1* 23.0* HGB 11.0* 11.9*  
HCT 31.4* 33.4*  
 353 Recent Labs  
  01/17/20 
0530 01/16/20 
1321 * 125* K 4.1 3.8 CL 97* 94* CO2 22 21 * 162* BUN 22* 19* CREA 0.60 0.51* CA 9.2 8.7 MG 2.1  --   
ALB  --  2.2*  
SGOT  --  33 ALT  --  27 INR 1.2  -- Results for orders placed or performed during the hospital encounter of 01/16/20 EKG, 12 LEAD, INITIAL Result Value Ref Range Ventricular Rate 132 BPM  
 Atrial Rate 163 BPM  
 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  
leads Confirmed by Ronald Turner MD, Thomas Marin ([id number]) on 1/16/2020 2:44:56 PM 
  
 Results for orders placed or performed in visit on 06/06/19 AMB POC EKG ROUTINE W/ 12 LEADS, INTER & REP Impression See progress note. All Cardiac Markers in the last 24 hours:   
Lab Results Component Value Date/Time TROIQ <0.02 01/16/2020 01:24 PM  
 
 
Last Lipid:  No results found for: CHOL, CHOLX, CHLST, CHOLV, HDL, HDLP, LDL, LDLC, DLDLP, TGLX, TRIGL, TRIGP, CHHD, CHHDX Signed By: PETE Astorga   
 January 17, 2020

## 2020-01-17 NOTE — PROGRESS NOTES
Shift Progress Note: Assumed care of patient in bed coughing intermittently, Remains on 2 lpm by nasal cannula. . No s/s of acute distress. Mews score 5 Hospitalist paged. Remains on telemetry, call bell within reach. [address] Paged MD Oswaldo Murillo about patient status, Patient is on [facility] who is coming down to see patient. 2:19 AM 
Called [facility] about any orders. Told their on the phone with attending and orders will be forthcoming soon. Patient awake and coughing intermittently, robitussin to be given, continue to monitor. 7:10 AM 
Patient stated\" I saw a piece of chocolate cake on the floor and I was trying to pick it up before you stepped on it. \" Redirected patient back to bed and instructed to use call bell for any concerns.

## 2020-01-17 NOTE — PROGRESS NOTES
conducted an initial consultation and Spiritual Assessment for Shagufta Garcia, who is a 80 y.o.,female. Patients Primary Language is: [place]. According to the patients EMR Hoahaoism Affiliation is: No Roman Catholic. The reason the Patient came to the hospital is:  
Patient Active Problem List  
 Diagnosis Date Noted  CAP (community acquired pneumonia) 01/17/2020  Sepsis due to pneumonia ([facility] [address] 01/17/2020  Hyponatremia 01/16/2020  A-fib ([facility] [address] 01/16/2020  Pneumonia 01/16/2020  Dyslipidemia 08/07/2018  
 HTN (hypertension) 07/18/2018  Trigeminal neuralgia 07/18/2018  [facility] 07/18/2018  Syncope 07/17/2018 The  provided the following Interventions: 
Initiated a relationship of care and support. Explored issues of ronak, spirituality and/or Buddhism needs while hospitalized. Listened empathically. Provided information about Spiritual Care Services. Offered prayer and assurance of continued prayers on patient's behalf. Chart reviewed. The following outcomes were achieved: 
Patient shared some information about their medical narrative and spiritual journey/beliefs. Patient processed feeling about current hospitalization. Patient expressed gratitude for the 's visit. Assessment: 
Patient did not indicate any spiritual or Buddhism issues which require Spiritual Care Services interventions at this time. Patient does not have any Buddhism/cultural needs that will affect patients preferences in health care. Plan: 
Chaplains will continue to follow and will provide pastoral care on an as needed or requested basis.  recommends bedside caregivers page  on duty if patient shows signs of acute spiritual or emotional distress.  Senthil Spence Spiritual Care Department [phone]

## 2020-01-18 NOTE — PROGRESS NOTES
Problem: Falls - Risk of 
Goal: *Absence of Falls Description Document Lashanda De La Rosa Fall Risk and appropriate interventions in the flowsheet. Outcome: Progressing Towards Goal 
Note: Fall Risk Interventions: 
Mobility Interventions: Bed/chair exit alarm, Communicate number of staff needed for ambulation/transfer, OT consult for ADLs, PT Consult for mobility concerns Mentation Interventions: Adequate sleep, hydration, pain control, Bed/chair exit alarm, Door open when patient unattended, Evaluate medications/consider consulting pharmacy, Eyeglasses and hearing aids, Family/sitter at bedside, Reorient patient Medication Interventions: Bed/chair exit alarm, Evaluate medications/consider consulting pharmacy Elimination Interventions: Bed/chair exit alarm, Call light in reach, Toileting schedule/hourly rounds Problem: Patient Education: Go to Patient Education Activity Goal: Patient/Family Education Outcome: Progressing Towards Goal 
  
Problem: Pressure Injury - Risk of 
Goal: *Prevention of pressure injury Description Document Tristen Scale and appropriate interventions in the flowsheet. Outcome: Progressing Towards Goal 
Note: Pressure Injury Interventions: 
  
 
Moisture Interventions: Absorbent underpads, Check for incontinence Q2 hours and as needed, Internal/External urinary devices, Maintain skin hydration (lotion/cream), Minimize layers Activity Interventions: Pressure redistribution bed/mattress(bed type), PT/OT evaluation Mobility Interventions: HOB 30 degrees or less, Pressure redistribution bed/mattress (bed type), PT/OT evaluation Nutrition Interventions: Document food/fluid/supplement intake Problem: Patient Education: Go to Patient Education Activity Goal: Patient/Family Education Outcome: Progressing Towards Goal 
  
Problem: Pneumonia: Day 1 Goal: Activity/Safety Outcome: Progressing Towards Goal 
Goal: Consults, if ordered Outcome: Progressing Towards Goal 
Goal: Diagnostic Test/Procedures Outcome: Progressing Towards Goal 
Goal: Nutrition/Diet Outcome: Progressing Towards Goal 
Goal: Medications Outcome: Progressing Towards Goal 
Goal: Respiratory Outcome: Progressing Towards Goal 
Goal: Treatments/Interventions/Procedures Outcome: Progressing Towards Goal 
Goal: Psychosocial 
Outcome: Progressing Towards Goal 
Goal: *Oxygen saturation within defined limits Outcome: Progressing Towards Goal 
Goal: *Influenza vaccine administered (October-March) Outcome: Progressing Towards Goal 
Goal: *Pneumoccocal vaccine administered Outcome: Progressing Towards Goal 
Goal: *Hemodynamically stable Outcome: Progressing Towards Goal 
Goal: *Demonstrates progressive activity Outcome: Progressing Towards Goal 
Goal: *Tolerating diet Outcome: Progressing Towards Goal 
  
Problem: Pneumonia: Day 2 Goal: Activity/Safety Outcome: Progressing Towards Goal 
Goal: Consults, if ordered Outcome: Progressing Towards Goal 
Goal: Diagnostic Test/Procedures Outcome: Progressing Towards Goal 
Goal: Nutrition/Diet Outcome: Progressing Towards Goal 
Goal: Discharge Planning Outcome: Progressing Towards Goal 
Goal: Medications Outcome: Progressing Towards Goal 
Goal: Respiratory Outcome: Progressing Towards Goal 
Goal: Treatments/Interventions/Procedures Outcome: Progressing Towards Goal 
Goal: Psychosocial 
Outcome: Progressing Towards Goal 
Goal: *Oxygen saturation within defined limits Outcome: Progressing Towards Goal 
Goal: *Hemodynamically stable Outcome: Progressing Towards Goal 
Goal: *Demonstrates progressive activity Outcome: Progressing Towards Goal 
Goal: *Tolerating diet Outcome: Progressing Towards Goal 
Goal: *Optimal pain control at patient's stated goal 
Outcome: Progressing Towards Goal

## 2020-01-18 NOTE — ROUTINE PROCESS
TRANSFER - OUT REPORT: 
 
Verbal report given to Stephanie RN(name) on Dara Vasquez  being transferred to [address] [place] [address](unit) for routine progression of care Report consisted of patients Situation, Background, Assessment and  
Recommendations(SBAR). Information from the following report(s) SBAR, Kardex, ED Summary, Intake/Output, MAR and Recent Results was reviewed with the receiving nurse. Lines:  
Peripheral IV 01/16/20 Left; Anterior Antecubital (Active) Site Assessment Clean, dry, & intact 1/17/2020  7:00 PM  
Phlebitis Assessment 0 1/17/2020  7:00 PM  
Infiltration Assessment 0 1/17/2020  7:00 PM  
Dressing Status Clean, dry, & intact 1/17/2020  7:00 PM  
Dressing Type Transparent;Tape 1/17/2020  7:00 PM  
Hub Color/Line Status Pink 1/17/2020  7:00 PM  
Action Taken Open ports on tubing capped 1/17/2020  7:00 PM  
Alcohol Cap Used Yes 1/17/2020  7:00 PM  
   
Peripheral IV 01/17/20 Left Wrist (Active) Site Assessment Clean, dry, & intact 1/17/2020  8:28 PM  
Phlebitis Assessment 0 1/17/2020  8:28 PM  
Infiltration Assessment 0 1/17/2020  8:28 PM  
Dressing Status Clean, dry, & intact 1/17/2020  8:28 PM  
Dressing Type Transparent;Tape 1/17/2020  8:28 PM  
Hub Color/Line Status Blue;Flushed;Capped 1/17/2020  8:28 PM  
Action Taken Open ports on tubing capped 1/17/2020  8:28 PM  
Alcohol Cap Used Yes 1/17/2020  8:28 PM  
  
 
Opportunity for questions and clarification was provided. Patient transported with: 
 Monitor O2 @ 4 liters Registered Nurse

## 2020-01-18 NOTE — PROGRESS NOTES
Patient refusing po cardizem. Refusing sips of water and food this evening. Son at bedside and encouraging patient to take medicine but she does not remember who he is and does not trust anyone in the room. Dr. Gonzalez Neither aware and will come see patient. RN to try again again with patient in an hour.

## 2020-01-18 NOTE — PROGRESS NOTES
Called to bedside for patient refusing cardizem. Pt did not recognized son, but now does at bedside. Pt refusing exam, but shows no acute dostress or changes form baseline regarding resp rate. VSS reviewed. Speaking in full sentences, but stating, claudia allen' say that because you are dead. ..are those teeth over there? \" 
 
 
 
Acute delirium 
-transient altered mentaiton,  
Windows were up during the day 
-will D/C codeine 
-ctd guaifenesin 
-no hypoxia, VSS with exception of permissible HR in 110s due to sepsis PNA. -continue to monitor for fever, constipation, and urinary retention SVT- resolved, Hr in 110s, 
-low threshold to switch bACK TO CARDIZEM gtt if pt refuses in an hour PNA, hemodynamically stable 
-continued current abx given improved leukocytosis form yesaterday Allie Guido MD PGY-3 
[address]

## 2020-01-18 NOTE — PROGRESS NOTES
120 [facility] Intern Progress Note Patient: Zafar Marlow MRN: [id number] SSN: [id number]  YOB: 1933 Age: 80 y.o. Sex: female Admit Date: 1/16/2020 LOS: 2 days Chief Complaint Patient presents with  Shortness of Breath  Irregular Heart Beat RVR Subjective:  
NT received call from nursing overnight for agitation and visited pt at bedside. Pt was mildly agitated and uncooperative. Stated she wished to get out of bed. PFM team and pt's daughter were able to discuss and explain that concerns existed for her safety: slipping and falling down. Ultimately, pt relented. Refused to take her nebulizer treatment and PO meds but remained in bed thereafter. Alert and in no acute distress at bedside visit this morning. Initally asleep but easily aroused. AAO x3 but appeared to see and speak to other people who were not actually present. Had no complaints other than persistent cough. Per pt, daughter and nursing, PRN robitussin is highly effective in managing pt's cough. Pt redirectable this morning and demonstrated more rational reasoning than last night. Stated she wished to get out of bed and ambulate but agreed to remain in bed until after PFM attending rounds for additional evaluation. Review of Systems Constitutional: Negative for chills and fever. HENT: Negative for congestion and sinus pain. Eyes: Negative for blurred vision and double vision. Respiratory: Positive for cough. Negative for hemoptysis, shortness of breath and wheezing. Non-productive cough Cardiovascular: Negative for chest pain and palpitations. Gastrointestinal: Negative for abdominal pain, constipation, nausea and vomiting. Genitourinary: Negative for dysuria and hematuria. Musculoskeletal: Negative for joint pain and myalgias. States she has minor pains occasionally but no new onset Neurological: Negative for dizziness and headaches. Objective:  
 
Visit Vitals BP (!) 150/91 (BP 1 Location: Right arm, BP Patient Position: At rest) Pulse (!) 110 Temp 98.6 °F (37 °C) Resp 20 Ht 4' 10\" (1.473 m) Wt 69.5 kg (153 lb 3.2 oz) SpO2 95% Breastfeeding No  
BMI 32.02 kg/m² Physical Exam:  
General appearance: alert, cooperative, no distress Lungs: clear to auscultation bilaterally Heart: regular rate and rhythm, S1, S2 normal, no murmur, click, rub or gallop Abdomen: soft, non-tender. Bowel sounds normal. No masses,  no organomegaly Pulses: 2+ and symmetric Skin: Skin color, texture, turgor normal. No rashes or lesions Neuro:  normal without focal findings 
mental status, speech normal, alert and oriented x iii Exremities:(-) LL edema b/l Intake and Output: 
Current Shift: No intake/output data recorded. Last three shifts: 01/16 1901 - 01/18 0700 In: 1438.7 [P.O.:120; I.V.:1318.7] Out: 100 [Urine:100] Lab/Data Review: 
Recent Results (from the past 12 hour(s)) METABOLIC PANEL, BASIC Collection Time: 01/18/20 12:23 AM  
Result Value Ref Range Sodium 134 (L) 136 - 145 mmol/L Potassium 4.1 3.5 - 5.5 mmol/L Chloride 102 100 - 111 mmol/L  
 CO2 23 21 - 32 mmol/L Anion gap 9 3.0 - 18 mmol/L Glucose 106 (H) 74 - 99 mg/dL BUN 23 (H) 7.0 - 18 MG/DL Creatinine 0.56 (L) 0.6 - 1.3 MG/DL  
 BUN/Creatinine ratio 41 (H) 12 - 20 GFR est AA >60 >60 ml/min/1.73m2 GFR est non-AA >60 >60 ml/min/1.73m2 Calcium 9.0 8.5 - 10.1 MG/DL MAGNESIUM Collection Time: 01/18/20 12:23 AM  
Result Value Ref Range Magnesium 2.1 1.6 - 2.6 mg/dL CBC WITH AUTOMATED DIFF Collection Time: 01/18/20 12:23 AM  
Result Value Ref Range WBC 23.5 (H) 4.6 - 13.2 K/uL  
 RBC 3.28 (L) 4.20 - 5.30 M/uL  
 HGB 10.6 (L) 12.0 - 16.0 g/dL HCT 30.7 (L) 35.0 - 45.0 % MCV 93.6 74.0 - 97.0 FL  
 MCH 32.3 24.0 - 34.0 PG  
 MCHC 34.5 31.0 - 37.0 g/dL  
 RDW 12.0 11.6 - 14.5 % PLATELET 353 829 - 277 K/uL MPV 9.4 9.2 - 11.8 FL  
 NEUTROPHILS 90 (H) 42 - 75 % LYMPHOCYTES 8 (L) 20 - 51 % MONOCYTES 2 2 - 9 % EOSINOPHILS 0 0 - 5 % BASOPHILS 0 0 - 3 %  
 ABS. NEUTROPHILS 21.1 (H) 1.8 - 8.0 K/UL  
 ABS. LYMPHOCYTES 1.9 0.8 - 3.5 K/UL  
 ABS. MONOCYTES 0.5 0 - 1.0 K/UL  
 ABS. EOSINOPHILS 0.0 0.0 - 0.4 K/UL  
 ABS. BASOPHILS 0.0 0.0 - 0.06 K/UL  
 DF MANUAL PLATELET COMMENTS Increased Platelets RBC COMMENTS ANISOCYTOSIS 
1+ Assessment and Plan:  
 
80 y.o. female with PMH significant for A-fib, HLD, HTN, SLE now admitted with cough x2 wks. 
 
  
1. Sepsis 2/2 CAP -  SIRS: , RR 44, WBC 23.0 subjective temp 100.9 w/likely source of infx: PNA. Pt w/previously productive cough x2 wks and diffuse wheezes on PE.  CXR demonstrated interstitial pnuemonitis. PLAN 
-continue azithromycin and ceftriaxone 
-f/u blood cx x2 
-continue robitussin PRN for cough 
-admit to tele 
-cardiac diet 
-VS per routine 
-Daily BMP, CBC, magnesium 
-Robitussin PRN for cough 
  
2. A-fib w/RVR - tachy as hi as 140s w/EKG showing A-fib w/RVR. CHADSVASC of 2. Coagulated in ED. Therapeutic Lovenox: 60mg PLAN 
-continue cardizem 100mg w/NS. Titrate 0-15cc/hr 
-continue lovenox 
-consult cards 
  
3. Hyponatremia -  Na 125 on admission. Receive NS  
PLAN 
-repeat BMP s/p IV bolus 
-NS maint dose: 100cc/hr 
-monitor daily labs 
-replete per protocol 
-f/u urine sodium, urine osmolality, serum osmolality 
  
4. UTI - Asymptomatic.  UA showed moderate blood, Trace LE, w/4+ bacteria and 3+ epithelial cells. Presence of epithelial cells may be suggestive of contaminated sample, however PLAN 
-continue abx as above will provide coverage for complicated UTI 
-continue cranberry supplement 450mg qday 
  
5. SLE 
PLAN 
-continue home meds: Omega 3, Vit C, Vit D, Vit E 
  
6. Trigeminal neuralgia PLAN 
-continue carbamazepine 100mg ER BID  
  
7. HLD PLAN 
-continue atorvastatin 10mg 
  
 8. HTN - systolic range 088-177 / diastolic range 565-22 PLAN 
-continue dilt ggt 
-hold amlodipine 5mg qday 
-continue losartan 100mg q2days 
-consider labetalol if hi BP persists Mansoor Borrero MD, PGY-1  
[facility] Medicine Intern Pager: [phone] January 18, 2020, 7:11 AM

## 2020-01-18 NOTE — ROUTINE PROCESS
TRANSFER - IN REPORT: 
 
Verbal report received from Monserrat(name) on Alvan Aase  being received from ED(unit) for routine progression of care Report consisted of patients Situation, Background, Assessment and  
Recommendations(SBAR). Information from the following report(s) SBAR, Kardex, MAR and Accordion was reviewed with the receiving nurse. Opportunity for questions and clarification was provided. Assessment completed upon patients arrival to unit and care assumed. Patient receiving IV cardizem as ordered. Continue to monitor.

## 2020-01-18 NOTE — ROUTINE PROCESS
Patient is alert, confused, disoriented to situation. Lungs coarse , scattered wheezing, mews 3 noted for HR. MD made aware. Patient refused nebulizer treatment, will try again. Daughter and nurse at the bedside. Continue to monitor. 2340  Physicians at the bedside assessing the patient.

## 2020-01-18 NOTE — PROGRESS NOTES
120 [facility] Brief Progress Note SUBJECTIVE Pt seen at bedside. Pt alseep with daughter, son and family friend at bedside. OBJECTIVE Visit Vitals /72 (BP 1 Location: Left arm, BP Patient Position: At rest) Pulse 97 Temp 98.3 °F (36.8 °C) Resp 20 Ht 4' 10\" (1.473 m) Wt 69.5 kg (153 lb 3.2 oz) SpO2 94% Breastfeeding No  
BMI 32.02 kg/m² No results found for this or any previous visit (from the past 12 hour(s)). Physical examination Consitutional: NAD Cardiovascular: RRR, no m/g/r Respiratory: Wheezing bilateral lung bases ASSESSMENT/PLAN 
VSS. Since per Tele pt's heart rate has ranged from 80's-90s added Albuterol 2.5 nebulization BID scheduled 
-Continue to monitor heart rate, Bps and RR. -Diltiazem drip stopped and Oral Diltiazem 30mg started 
-Albuterol 2.5 nebulization BID scheduled Gardenia Martell MD, PGY-1  
[facility] Medicine January 18, 2020, 3:23 PM

## 2020-01-18 NOTE — ROUTINE PROCESS
Bedside and Verbal shift change report given to [facility] (oncoming nurse) by Judith Florian (offgoing nurse). Report included the following information SBAR, Kardex, Intake/Output, Recent Results and Cardiac Rhythm Atrial fib.

## 2020-01-18 NOTE — PROGRESS NOTES
Patient became combative and threatening to hit staff. PFM notified. Nebulizers and bladder scan ordered and RN to notify doctor if no change. Bladder scan showed 4 mL post void residual. Respiratory therapist called to bedside. Patient audibly wheezing. Family friend arrived and calming patient. Sitter and family friend at bedside. Will continue to monitor.

## 2020-01-18 NOTE — ROUTINE PROCESS
0001-Assumed care. Patient alert and oriented to person only. Patient hallucinating and seeing \"Sabine\" in room. Attempted to reorient patient. Patient with mild dyspnea and tachypnea at times. O2 saturations 95 percent. Patient has non productive cough. Patient has wheezing, but refuses to take albuterol nebulizer. Patient with external femal catheter, but patient incontinent of urine and smear of stool. Cleaned patient and new purewick applied. Call bell in reach. Bed alarm on. Side rails up x 3. Nurse sitting with patient for safety. 0100- patient looking up towards ceiling and talking to someone named Murray. Patient is hallucinating at present time. Reoriented patient . 0315--patient awake and coughing. frequently. . Tessalon capsule given. . Patient telling nurse to check out the window in the backyard. Reoriented patient  
[phone] 72-. Robitussin AC given PO for frequent hacking cough. 0445-Patient throwing legs off right side of bed. Bed alarm ringing. Patient reports that she has to go to the bathroom. Patient reinstructed about purewick external catheter. Patient voided in [place]. Patient insist that she has been incontinent. Patients pads are dry and clean. 0630- Bath completed. Patient awake and alert. Normal saline infusion discontinued per MD order. 0730- Daughter has left to go home. Nonius.Fuse- Mediications given per MD order. Patient swallows pills well. 0539- Patient eating breakfast. Patient reports that she can't remember anything that happened last night. 0900- Robitussin AC given PO for continuous cough.

## 2020-01-18 NOTE — PROGRESS NOTES
[facility] - Progress Note Admit Date: 1/16/2020 new AF in setting of sepsis Assessment:  
 
Hospital Problems  Date Reviewed: 6/6/2019 Codes Class Noted POA  
 CAP (community acquired pneumonia) ICD-10-CM: J18.9 ICD-9-CM: 679  1/17/2020 Unknown * (Principal) Sepsis due to pneumonia [facility]) ICD-10-CM: J18.9, A41.9 ICD-9-CM: 903, 995.91  1/17/2020 Unknown Hyponatremia ICD-10-CM: E87.1 ICD-9-CM: 276.1  1/16/2020 Unknown A-fib [facility]) ICD-10-CM: I48.91 
ICD-9-CM: 427.31  1/16/2020 Unknown Pneumonia ICD-10-CM: J18.9 ICD-9-CM: 365  1/16/2020 Unknown  
   
  
-Sepsis likely secondary to Community Acquired Pneumonia along with possible UTI. -Atrial fibrillation with RVR. New diagnosis. Likely driven by underlying pulmonary process. -H/o syncope/bradycardia while on BB 7/2018. Patient underwent a pharmacologic nuclear stress test, echo and a 30-day event monitor at that time, all of which were normal.  
-Hypertension. Stable. -Hyperlipidemia. On statin. -SL with lupus nephritis. 
-Chronic HAJI. -Chronic pain, fibromyalgia. 
-Anxiety, depression. 
  
Primary cardiologist Dr. Faith Johnson. Plan:  
 
Nonvalvular pAF, with normal LV function in setting of sepsis (echo results dw pt) Rates will be suboptimal (but acceptable <120s) in setting of PNA, did not wean off gtt as she's still quite agitated and coughing but I did increase her BB a bit Fluid status stable, support care, needs NOAC at DC Subjective: No new complaints but noticeably coughing a lot. Objective:  
  
Patient Vitals for the past 8 hrs: 
 Temp Pulse Resp BP SpO2  
01/18/20 1133 98.3 °F (36.8 °C) 97 20 147/72 94 % 01/18/20 0758 97.2 °F (36.2 °C) 97 24 141/65 95 % Patient Vitals for the past 96 hrs: 
 Weight 01/18/20 0445 69.5 kg (153 lb 3.2 oz) 01/17/20 0853 63.5 kg (140 lb) 01/16/20 1250 63.5 kg (140 lb) Intake/Output Summary (Last 24 hours) at 1/18/2020 1203 Last data filed at 1/18/2020 1058 Gross per 24 hour Intake 1586.67 ml Output 500 ml Net 1086.67 ml Physical Exam: 
General:  alert, cooperative, no distress, appears stated age Neck:  nontender Lungs:  Coarse b/l, coughing, no acc m use Heart:  irreg irreg Abdomen:  abdomen is soft without significant tenderness, masses, organomegaly or guarding Extremities:  extremities normal, atraumatic, no cyanosis or edema Data Review:  
 
Labs: Results:  
   
Chemistry Recent Labs  
  01/18/20 0023 01/17/20 
0530 01/16/20 
1321 * 129* 162* * 129* 125* K 4.1 4.1 3.8  97* 94* CO2 23 22 21 BUN 23* 22* 19* CREA 0.56* 0.60 0.51* CA 9.0 9.2 8.7 MG 2.1 2.1  --   
AGAP 9 10 10 BUCR 41* 37* 37* AP  --   --  130* TP  --   --  6.6 ALB  --   --  2.2*  
GLOB  --   --  4.4* AGRAT  --   --  0.5* CBC w/Diff Recent Labs  
  01/18/20 0023 01/17/20 0530 01/16/20 
1324 WBC 23.5* 26.1* 23.0*  
RBC 3.28* 3.43* 3.64* HGB 10.6* 11.0* 11.9*  
HCT 30.7* 31.4* 33.4*  
 415 353 GRANS 90* 72 69 LYMPH 8* 5* 4*  
EOS 0 0 0 Cardiac Enzymes No results found for: CPK, CK, CKMMB, CKMB, RCK3, CKMBT, CKNDX, CKND1, MAYITO, TROPT, TROIQ, CARRIE, TROPT, TNIPOC, BNP, BNPP Coagulation Recent Labs  
  01/17/20 0530 PTP 14.7 INR 1.2 APTT 31.9 Lipid Panel No results found for: CHOL, CHOLPOCT, CHOLX, CHLST, CHOLV, 432568, HDL, HDLP, LDL, LDLC, DLDLP, 193828, VLDLC, VLDL, TGLX, TRIGL, TRIGP, TGLPOCT, CHHD, CHHDX  
BNP No results found for: BNP, BNPP, XBNPT Liver Enzymes Recent Labs  
  01/16/20 
1321 TP 6.6 ALB 2.2* * SGOT 33 Digoxin Thyroid Studies Lab Results Component Value Date/Time TSH 2.63 01/17/2020 05:30 AM  
    
 
01/16/20 ECHO ADULT COMPLETE 01/17/2020 1/17/2020 Narrative · Image quality for this study was technically difficult. · Normal cavity size, wall thickness and systolic function (ejection fraction normal). Estimated left ventricular ejection fraction is 60 -  
65%. Visually measured ejection fraction. No regional wall motion  
abnormality noted. · Pulmonary arterial systolic pressure is 30 mmHg. · Trivial pericardial effusion adjacent to right ventricle. Signed by: Devi Adame MD  
 
08/14/18 NUCLEAR CARDIAC STRESS TEST 08/14/2018 8/14/2018 Narrative · Left ventricular perfusion is normal. 
· Gated SPECT: Left ventricular function post-stress was normal.  
Calculated ejection fraction is 83%. · Negative myocardial perfusion imaging. Myocardial perfusion imaging  
supports a low risk stress test. 
   
  Signed by: Noe Headley MD  
 
 
 
Signed By: Leia Griffith DO   
 January 18, 2020

## 2020-01-19 NOTE — PROGRESS NOTES
Problem: Mobility Impaired (Adult and Pediatric) Goal: *Acute Goals and Plan of Care (Insert Text) Description Physical Therapy Goals Initiated 1/19/2020 and to be accomplished within 7 day(s) 1. Patient will transition supine to sit in bed with Poncho. 2.  Patient will transfer from bed to chair and chair to bed with Poncho using the least restrictive device. 3.  Patient will perform sit to stand with Poncho. 4.  Patient will ambulate with Poncho for 200 feet with the least restrictive device. 5.  Patient will ascend/descend 3 stairs with unilateral handrail(s) with SBA-CGA. PLOF: Pt reporting she lives in a 1 story house with 3 steps to enter with working daughter. Pt reports before admission she was independent at home and driving. Pt does have a handrail to pull up in bed but does report using recliner more often due to back pain. Outcome: Progressing Towards Goal 
 
PHYSICAL THERAPY EVALUATION Patient: Brenna Hameed (89 y.o. female) Date: 1/19/2020 Primary Diagnosis: A-fib ([facility] [address] [I48.91] Pneumonia [J18.9] Hyponatremia [E87.1] CAP (community acquired pneumonia) [J18.9] Precautions: Fall PLOF: see above ASSESSMENT : 
Based on the objective data described below, the patient presents with decreased balance reactions, decreased strength and decreased independence in functional mobility. Pt agreeable to PT session with son present throughout. Oxygen at 4L O2 via nasal cannula. Pt with SPO2 of 87-93% throughout session, able to elevate O2 levels with deep breathing. Pt demonstrating 4/5 LE strength but does demonstrate decreased sitting and standing balance. Pt demonstrated Poncho bed mobility with bedrails and increased time. Sitting on EOB with UE support on bedrail, able to don bilateral socks with CGA. Dependent assist to don shoes, pt reporting she is unable to stand without shoes due to foot pain.  Pt completed sit to stand with Daniel. Ambulating x4 feet to bedside recliner with handhold assist. Pt left with son present in recliner chair with all needs met and call bell in place. Discussed need to continue to call nursing for transfers. Patient will benefit from skilled intervention to address the above impairments. Patient's rehabilitation potential is considered to be Good. Factors which may influence rehabilitation potential include:  
[]         None noted 
[]         Mental ability/status []         Medical condition 
[x]         Home/family situation and support systems 
[]         Safety awareness 
[]         Pain tolerance/management 
[]         Other: PLAN : 
Recommendations and Planned Interventions: 
[x]           Bed Mobility Training             [x]    Neuromuscular Re-Education 
[x]           Transfer Training                   []    Orthotic/Prosthetic Training 
[x]           Gait Training                          []    Modalities [x]           Therapeutic Exercises           []    Edema Management/Control 
[x]           Therapeutic Activities            [x]    Family Training/Education 
[x]           Patient Education 
[]           Other (comment): Frequency/Duration: Patient will be followed by physical therapy 1-2 times per day/4-7 days per week to address goals. Discharge Recommendations: home with 24 house assist 
Further Equipment Recommendations for Discharge: RW   
 
SUBJECTIVE:  
Patient stated Nancy Stable would love to get up, but why are you putting roller skates on me (when donning sneakers).  OBJECTIVE DATA SUMMARY:  
 
Past Medical History:  
Diagnosis Date Dyslipidemia H/O echocardiogram 07/2018 EF 65%, thickening of the mitral valve leaflets with annular calcification, no regurgitation or stenosis Hypertension Syncope 07/2018 No past surgical history on file. Barriers to Learning/Limitations: confusion Compensate with: verbal and tactile cues Home Situation: Home Situation Home Environment: Private residence # Steps to Enter: 3 One/Two Story Residence: Two story, live on 1st floor # of Interior Steps: 15 Height of Each Step (in): 6 inches Interior Rails: Both Lift Chair Available: No 
Living Alone: No 
Support Systems: Family member(s) Patient Expects to be Discharged to[de-identified] Private residence Current DME Used/Available at Home: [address], [place], straight, Grab bars, Shower chair, Kathi Edward, rollator, Wheelchair, Other (comment)(power scooter) Critical Behavior: 
  
  
  
  
  
  
  
  
  
  
Strength:   
Strength: Generally decreased, functional(4/5 BLEs) Tone & Sensation:  
Tone: Normal 
  
  
  
  
Sensation: Intact Range Of Motion: 
AROM: Within functional limits(BLEs) Posture: 
Posture (WDL): Exceptions to [facility] Posture Assessment: Increased;Trunk flexion Functional Mobility: 
Bed Mobility: 
Rolling: Modified independent(increased time and use of bedrails) Supine to Sit: Stand-by assistance(use of bedrail) Scooting: Stand-by assistance Transfers: 
Sit to Stand: Minimum assistance Stand to Sit: Contact guard assistance Bed to Chair: Minimum assistance Balance:  
Sitting: Impaired; With support Sitting - Static: Good (unsupported) Sitting - Dynamic: Fair (occasional) Standing: Impaired; Without support Standing - Static: Fair Standing - Dynamic : Fair Wheelchair Mobility: 
  
  
Ambulation/Gait Training: 
Distance (ft): 4 Feet (ft) Assistive Device: (1 handhold ) Ambulation - Level of Assistance: Minimal assistance Gait Description (WDL): Exceptions to [facility] Gait Abnormalities: Decreased step clearance;Shuffling gait Base of Support: Widened Speed/Irish: Slow;Shuffled Step Length: Left shortened;Right shortened Stairs: Therapeutic Exercises:  
Instructed in and completed seated LAQ and marching, discussed continuing throughout the day Pain: 
Pain level pre-treatment: 3/10 in back Pain level post-treatment: 0/10 in back Pain Intervention(s) :Repositioning Activity Tolerance:  
Fair activity tolerance, decreased endurance and decreased O2 levels even with O2 donned Please refer to the flowsheet for vital signs taken during this treatment. After treatment:  
[x]         Patient left in no apparent distress sitting up in chair 
[]         Patient left in no apparent distress in bed 
[x]         Call bell left within reach [x]         Nursing notified 
[x]         Caregiver present 
[]         Bed alarm activated 
[]         SCDs applied COMMUNICATION/EDUCATION:  
[x]         Role of Physical Therapy in the acute care setting. [x]         Fall prevention education was provided and the patient/caregiver indicated understanding. [x]         Patient/family have participated as able in goal setting and plan of care. []         Patient/family agree to work toward stated goals and plan of care. []         Patient understands intent and goals of therapy, but is neutral about his/her participation. []         Patient is unable to participate in goal setting/plan of care: ongoing with therapy staff. 
[]         Other: Thank you for this referral. 
Jared Ferreira, PT Time Calculation: 23 mins Eval Complexity: History: MEDIUM  Complexity : 1-2 comorbidities / personal factors will impact the outcome/ POC Exam:MEDIUM Complexity : 3 Standardized tests and measures addressing body structure, function, activity limitation and / or participation in recreation  Presentation: LOW Complexity : Stable, uncomplicated  Clinical Decision Making:Low Complexity low  Overall Complexity:LOW

## 2020-01-19 NOTE — PROGRESS NOTES
[facility] - Progress Note Admit Date: 1/16/2020 AFib RVR, HTN in setting of Sepsis Assessment:  
 
Hospital Problems  Date Reviewed: 6/6/2019 Codes Class Noted POA  
 CAP (community acquired pneumonia) ICD-10-CM: J18.9 ICD-9-CM: 366  1/17/2020 Unknown * (Principal) Sepsis due to pneumonia [facility]) ICD-10-CM: J18.9, A41.9 ICD-9-CM: 101, 995.91  1/17/2020 Unknown Hyponatremia ICD-10-CM: E87.1 ICD-9-CM: 276.1  1/16/2020 Unknown A-fib [facility]) ICD-10-CM: I48.91 
ICD-9-CM: 427.31  1/16/2020 Unknown Pneumonia ICD-10-CM: J18.9 ICD-9-CM: 622  1/16/2020 Unknown  
   
  
-Sepsis likely secondary to Community Acquired Pneumonia along with possible UTI. -Atrial fibrillation with RVR. New diagnosis. Likely driven by underlying pulmonary process.  
-H/o syncope/bradycardia while on BB 7/2018. Patient underwent a pharmacologic nuclear stress test, echo and a 30-day event monitor at that time, all of which were normal.  
-Hypertension. Stable. -Hyperlipidemia. On statin. -SL with lupus nephritis. 
-Chronic HAJI. -Chronic pain, fibromyalgia. 
-Anxiety, depression, current delirium? 
  
Primary cardiologist Dr. Faith Johnson. Plan:  
Aware of agitation, refused po med so RVR overnight and Cardizem gtt back on Son now here from out of town, updated- incl results of echo dw him Rates will be suboptimal in setting of PNA, once getting po meds more consistently will be easier to titrate gtt off Fluid status stable, supportive care, will need to discuss NOAC at DC if mentation comes back Subjective:  
 
Pt noticeably coughing less, appears more comfortable but still agitated and not answering questions appropriately Objective:  
  
Patient Vitals for the past 8 hrs: 
 Temp Pulse Resp BP SpO2  
01/19/20 0719 98 °F (36.7 °C) (!) 118 20 162/89 94 % 01/19/20 0318 98 °F (36.7 °C) (!) 113 20 145/72 94 % Patient Vitals for the past 96 hrs: 
 Weight 01/19/20 0402 68 kg (150 lb) 01/18/20 0445 69.5 kg (153 lb 3.2 oz) 01/17/20 0853 63.5 kg (140 lb) 01/16/20 1250 63.5 kg (140 lb) Intake/Output Summary (Last 24 hours) at 1/19/2020 0932 Last data filed at 1/19/2020 8473 Gross per 24 hour Intake 331 ml Output 625 ml Net -294 ml Physical Exam: 
General:  alert, but confused/ agitated/ negative affect Neck:  nontender Lungs:  Coarse b/s, no acc m use Heart: irreg irreg Abdomen:  abdomen is soft without significant tenderness, masses, organomegaly or guarding Extremities:  extremities normal, atraumatic, no cyanosis or edema Data Review:  
 
Labs: Results:  
   
Chemistry Recent Labs  
  01/19/20 
0119 01/18/20 
0023 01/17/20 
0530 01/16/20 
1321 * 106* 129* 162* * 134* 129* 125* K 3.8 4.1 4.1 3.8  102 97* 94* CO2 23 23 22 21 BUN 19* 23* 22* 19* CREA 0.50* 0.56* 0.60 0.51* CA 9.1 9.0 9.2 8.7 MG 2.2 2.1 2.1  --   
AGAP 8 9 10 10 BUCR 38* 41* 37* 37* AP  --   --   --  130* TP  --   --   --  6.6 ALB  --   --   --  2.2*  
GLOB  --   --   --  4.4* AGRAT  --   --   --  0.5* CBC w/Diff Recent Labs  
  01/19/20 
0119 01/18/20 
0023 01/17/20 
0530 WBC 20.1* 23.5* 26.1*  
RBC 3.22* 3.28* 3.43* HGB 10.2* 10.6* 11.0*  
HCT 30.7* 30.7* 31.4*  
 401 415 GRANS 91* 90* 72 LYMPH 2* 8* 5* EOS 0 0 0 Cardiac Enzymes No results found for: CPK, CK, CKMMB, CKMB, RCK3, CKMBT, CKNDX, CKND1, MAYITO, TROPT, TROIQ, CARRIE, TROPT, TNIPOC, BNP, BNPP Coagulation Recent Labs  
  01/17/20 
0530 PTP 14.7 INR 1.2 APTT 31.9 Lipid Panel No results found for: CHOL, CHOLPOCT, CHOLX, CHLST, CHOLV, 366530, HDL, HDLP, LDL, LDLC, DLDLP, 469771, VLDLC, VLDL, TGLX, TRIGL, TRIGP, TGLPOCT, CHHD, CHHDX  
BNP No results found for: BNP, BNPP, XBNPT Liver Enzymes Recent Labs  
  01/16/20 
1321 TP 6.6 ALB 2.2* * SGOT 33 Digoxin Thyroid Studies Lab Results Component Value Date/Time TSH 2.63 01/17/2020 05:30 AM  
    
 
01/16/20 ECHO ADULT COMPLETE 01/17/2020 1/17/2020 Narrative · Image quality for this study was technically difficult. · Normal cavity size, wall thickness and systolic function (ejection  
fraction normal). Estimated left ventricular ejection fraction is 60 -  
65%. Visually measured ejection fraction. No regional wall motion  
abnormality noted. · Pulmonary arterial systolic pressure is 30 mmHg. · Trivial pericardial effusion adjacent to right ventricle. Signed by: Teetee Travis MD  
 
 
 
Signed By: Sen Sanon DO   
 January 19, 2020

## 2020-01-19 NOTE — ROUTINE PROCESS
Bedside shift change report given to Stephanie (oncoming nurse) by Kary Nathan (offgoing nurse). Report included the following information SBAR, Kardex and MAR.

## 2020-01-19 NOTE — PROGRESS NOTES
Problem: Self Care Deficits Care Plan (Adult) Goal: *Acute Goals and Plan of Care (Insert Text) Description Occupational Therapy Goals Initiated 1/19/2020 within 7 day(s). 1.  Patient will perform lower body dressing with modified independence and no c/o increased pain 2. Patient will perform grooming with modified independence while standing at the sink 3. Patient will perform bathing with modified independence and no c/o increased pain 4. Patient will perform toilet transfers with modified independence. 5.  Patient will perform all aspects of toileting with modified independence. Outcome: Not Progressing Towards Goal (only eval was completed) OCCUPATIONAL THERAPY EVALUATION Patient: Shagufta Garcia (63 y.o. female) Date: 1/19/2020 Primary Diagnosis: A-fib ([facility] [address] [I48.91] Pneumonia [J18.9] Hyponatremia [E87.1] CAP (community acquired pneumonia) [J18.9] Precautions: fall PLOF: she reports that she was independent, she just pushed through the pain; her back pain limits her rest as she finds that she needs to sleep in her recliner; she has a history of falls (following questioning, she reports she feels back and leg pain and falls to the ground and it occurs mostly in the morning). She reports having her husbands reacher and sock aid at home and she does not know how to use it. She has agreed to having those techniques reviewed with her. ASSESSMENT : 
Based on the objective data described below, the patient presents with decreased functional mobility and pain limiting her ability to bend and reach her lower body efficiently for her self care routine participation. Patient will benefit from skilled intervention to address the above impairments. Patient's rehabilitation potential is considered to be Fair Factors which may influence rehabilitation potential include:  
[]             None noted []             Mental ability/status []             Medical condition []             Home/family situation and support systems []             Safety awareness [x]             Pain tolerance/management [x]             Other: she is minimally receptive to change PLAN : 
Recommendations and Planned Interventions:  
[x]               Self Care Training                  [x]      Therapeutic Activities [x]               Functional Mobility Training   []      Cognitive Retraining 
[]               Therapeutic Exercises           []      Endurance Activities []               Balance Training                    []      Neuromuscular Re-Education []               Visual/Perceptual Training     []      Home Safety Training 
[x]               Patient Education                   [x]      Family Training/Education [x]               Other (comment): therapeutic use of self Frequency/Duration: Patient will be followed by occupational therapy 1-2 times per day/4-7 days per week to address goals. Discharge Recommendations: [address]; feel she needs 24 hour supervision at this time secondary to her falling and her being minimally receptive to change Further Equipment Recommendations for Discharge: N/A  
 
SUBJECTIVE:  
Patient stated I was very independent before this.  OBJECTIVE DATA SUMMARY:  
 
Past Medical History:  
Diagnosis Date Dyslipidemia H/O echocardiogram 07/2018 EF 65%, thickening of the mitral valve leaflets with annular calcification, no regurgitation or stenosis Hypertension Syncope 07/2018 No past surgical history on file. Barriers to Learning/Limitations: yes;  limited acceptance of changing techniques Compensate with: therapeutic use of self Home Situation:  
Home Situation Home Environment: Private residence # Steps to Enter: 3 One/Two Story Residence: Two story, live on 1st floor # of Interior Steps: 15 Height of Each Step (in): 6 inches Interior Rails: Both Lift Chair Available: No 
Living Alone: No 
 Support Systems: Family member(s) Patient Expects to be Discharged to[de-identified] Private residence Current DME Used/Available at Home: [address], [place], straight, Grab bars, Shower chair, Mollie Crater, rollator, Wheelchair, Other (comment)(power scooter) [x]  Right hand dominant   []  Left hand dominant Cognitive/Behavioral Status: 
Neurologic State: Alert, oriented X 3 Skin: no skin integrity issues noted during OT evaluation Edema: no UE edema noted Vision/Perceptual:   
  [facility]; she has macular degeneration and central vision is unclear; her visitor reports that she is still driving but only during the day; not receptive to hearing it is unsafe to drive with poor vision as she feels she has made enough accommodations by not driving at night Coordination: BUE Coordination: Within functional limits Balance: 
Sitting requires CG when she needs to look down to bend in preparation for LB dressing Standing requires min assist during simulated LB dressing Strength: BUE 
4-/5 Tone & Sensation: BUE 
[facility] Range of Motion: BUE 
AROM WFL; minimal arthritic changes noted throughout Functional Mobility and Transfers for ADLs: 
Bed Mobility: 
Rolling: Modified independent(increased time and use of bedrails) Supine to Sit: Stand-by assistance(use of bedrail) Scooting: Stand-by assistance Transfers: 
Sit to Stand: Minimum assistance Stand to Sit: Contact guard assistance Bed to Chair: Minimum assistance ADL Assessment:  
 Self feeding: independent Grooming: independent simulated in sitting UB bathing/dressing: modified independent LB bathing/dressing: mod assist secondary to pain and decreased standing ADL Intervention: LB dressing adaptive equipment recommendations made; she is minimally receptive to using the equipment although she has agreed to additional training Pain: 
Pain level pre-treatment: 0/10 Pain level post-treatment: 0/10 Activity Tolerance: Her anticipation of back pain limits her activity participation Please refer to the flowsheet for vital signs taken during this treatment. After treatment:  
[x] Patient left in no apparent distress sitting up in chair 
[] Patient left in no apparent distress in bed 
[x] Call bell left within reach 
[] Nursing notified 
[x] visitor present and is holding her hand 
[] Bed alarm activated COMMUNICATION/EDUCATION:  
[] Role of Occupational Therapy in the acute care setting 
[] Home safety education was provided and the patient/caregiver indicated understanding. [x] Patient/family have participated as able in goal setting and plan of care. [] Patient/family agree to work toward stated goals and plan of care. [] Patient understands intent and goals of therapy, but is neutral about his/her participation. [] Patient is unable to participate in goal setting and plan of care. Thank you for this referral. 
Andre Pickett, OTR/L Time Calculation: 16 mins Eval Complexity: History: LOW Complexity : Brief history review ; Examination: LOW Complexity : 1-3 performance deficits relating to physical, cognitive , or psychosocial skils that result in activity limitations and / or participation restrictions ; Decision Making:LOW Complexity : No comorbidities that affect functional and no verbal or physical assistance needed to complete eval tasks

## 2020-01-19 NOTE — PROGRESS NOTES
Problem: Pressure Injury - Risk of 
Goal: *Prevention of pressure injury Description Document Tristen Scale and appropriate interventions in the flowsheet. Outcome: Progressing Towards Goal 
Note: Pressure Injury Interventions: 
  
 
Moisture Interventions: Absorbent underpads, Internal/External urinary devices Activity Interventions: PT/OT evaluation Mobility Interventions: PT/OT evaluation Nutrition Interventions: Document food/fluid/supplement intake

## 2020-01-19 NOTE — PROGRESS NOTES
Patient in afib with rate greater than 120. BP of 162/89. Dr. Rivera Sotelo notified. Give morning meds and EKG ordered.

## 2020-01-19 NOTE — PROGRESS NOTES
[address] Progress Note Patient: Nicol Hough MRN: [id number] SSN: [id number]  YOB: 1933 Age: 80 y.o. Sex: female Admit Date: 1/16/2020 LOS: 3 days Chief Complaint Patient presents with  Shortness of Breath  Irregular Heart Beat RVR Subjective:  
 
O/N: 
Pt had episode of delirium that resolved Metoprolol increased by cardiology Scheduled albuterol and ipratropium nebs started Pt refused PO cardizem last night. Cardizem drip restarted at 2.5mg/hr, but increased to 5mg/hr due to hrs>120s Nursing reports AFIB with RVR to 130s-140s at 0700 Pt resting comfortably after poor sleep overnight. ROS: unable to obtain due to delirium and pt resting resting comfortably. Hx assisted by son who stayed overnight. Objective:  
 
Visit Vitals /72 (BP 1 Location: Left arm, BP Patient Position: At rest) Pulse (!) 113 Temp 98 °F (36.7 °C) Resp 20 Ht 4' 10\" (1.473 m) Wt 68 kg (150 lb) SpO2 94% Breastfeeding No  
BMI 31.35 kg/m² Physical Exam:  
General appearance: resting comofrtably Lungs: mild exp wheezes b/l with good air movement Heart: regular rate and rhythm, S1, S2 normal, no murmur, click, rub or gallop Abdomen: soft, non-tender. Bowel sounds normal. No masses,  no organomegaly Pulses: 2+ and symmetric Skin: Skin color, texture, turgor normal. No rashes or lesions Neuro:  Resting comfortably, deferred to waken due to agitation the night before Exremities:(-) LL edema b/l Intake and Output: 
Current Shift: 01/18 1901 - 01/19 0700 In: -  
Out: 100 [Urine:100] Last three shifts: 01/17 0701 - 01/18 1900 In: 1766.7 [P.O.:660; I.V.:1106.7] Out: 925 [Urine:925] Lab/Data Review: 
Recent Results (from the past 12 hour(s)) METABOLIC PANEL, BASIC Collection Time: 01/19/20  1:19 AM  
Result Value Ref Range Sodium 135 (L) 136 - 145 mmol/L  Potassium 3.8 3.5 - 5.5 mmol/L  
 Chloride 104 100 - 111 mmol/L  
 CO2 23 21 - 32 mmol/L Anion gap 8 3.0 - 18 mmol/L Glucose 116 (H) 74 - 99 mg/dL BUN 19 (H) 7.0 - 18 MG/DL Creatinine 0.50 (L) 0.6 - 1.3 MG/DL  
 BUN/Creatinine ratio 38 (H) 12 - 20 GFR est AA >60 >60 ml/min/1.73m2 GFR est non-AA >60 >60 ml/min/1.73m2 Calcium 9.1 8.5 - 10.1 MG/DL MAGNESIUM Collection Time: 01/19/20  1:19 AM  
Result Value Ref Range Magnesium 2.2 1.6 - 2.6 mg/dL CBC WITH AUTOMATED DIFF Collection Time: 01/19/20  1:19 AM  
Result Value Ref Range WBC 20.1 (H) 4.6 - 13.2 K/uL  
 RBC 3.22 (L) 4.20 - 5.30 M/uL  
 HGB 10.2 (L) 12.0 - 16.0 g/dL HCT 30.7 (L) 35.0 - 45.0 % MCV 95.3 74.0 - 97.0 FL  
 MCH 31.7 24.0 - 34.0 PG  
 MCHC 33.2 31.0 - 37.0 g/dL  
 RDW 12.2 11.6 - 14.5 % PLATELET 792 708 - 279 K/uL MPV 8.9 (L) 9.2 - 11.8 FL  
 NEUTROPHILS 91 (H) 42 - 75 % BAND NEUTROPHILS 1 0 - 5 % LYMPHOCYTES 2 (L) 20 - 51 % MONOCYTES 6 2 - 9 % EOSINOPHILS 0 0 - 5 % BASOPHILS 0 0 - 3 %  
 ABS. NEUTROPHILS 18.5 (H) 1.8 - 8.0 K/UL  
 ABS. LYMPHOCYTES 0.4 (L) 0.8 - 3.5 K/UL  
 ABS. MONOCYTES 1.2 (H) 0 - 1.0 K/UL  
 ABS. EOSINOPHILS 0.0 0.0 - 0.4 K/UL  
 ABS. BASOPHILS 0.0 0.0 - 0.06 K/UL  
 DF MANUAL PLATELET COMMENTS ADEQUATE PLATELETS    
 RBC COMMENTS NORMOCYTIC, NORMOCHROMIC Assessment and Plan:  
 
80 y.o. female with PMH significant for A-fib, HLD, HTN, SLE now admitted with cough x2 wks. 
 
  
Sepsis 2/2 CAP - Improving, slow down trending of WBC 
 SIRS: , RR 44, WBC 23.0 subjective temp 100.9 w/likely source of infx: PNA. Pt w/previously productive cough x2 wks and diffuse wheezes on PE.  CXR demonstrated interstitial pnuemonitis. PLAN 
-continue azithromycin and ceftriaxone for at least 5 days (Day#3/5) -f/u blood cx x2 
-reassess need for steroids due to wheezing and hx of SLE 
-continue robitussin scheduled for cough 
 
  
A-fib w/RVR - tachy as hi as 140s w/EKG showing A-fib w/RVR. -CHADSVASC of 2. Coagulated in ED. Therapeutic Lovenox: 60mg PLAN 
-continue Gtt at 5mg/hr 
-Dilt 5mg x1 
-ctd metoprolol 25 bid, give early today 
-STAT EKG and cardiac panel 
-continue therapeutic lovenox  
-discuss [facility] with family regaridng fall and bleeding risk, card recommending Eliquis 
-cards recs appreciated 
  
HTN - systolic range 756-105 / diastolic range 895-55 PLAN 
-continue dilt ggt 
-hold amlodipine 5mg qday 
-consider adding home losartan if Bps still elevated 
-consider labetalol if hi BP persists greater than 180/110 Hyponatremia -  Na 125 on admission. Received NS, improved to greater than 130 Ser Osm:275(low)  Alicia:15 Urine Osm:554 PLAN 
-continue fluid restriction of 1200 
  
UTI - Asymptomatic.  UA showed moderate blood, Trace LE, w/4+ bacteria and 3+ epithelial cells. Presence of epithelial cells may be suggestive of contaminated sample, however PLAN 
-continue abx as above will provide coverage for complicated UTI 
-continue cranberry supplement 450mg qday 
  
Hx of SLE, but no home  prescribed meds PLAN 
-continue home meds: Omega 3, Vit C, Vit D, Vit E 
  
Trigeminal neuralgia PLAN 
-continue carbamazepine 100mg ER BID HLD PLAN 
-continue atorvastatin 10mg 
  
 
Nikolai Junior MD PGY-3 
[address]

## 2020-01-19 NOTE — ROUTINE PROCESS
Report received from [facility]. Patient is in bed, eyes closed. O2 via NC in place. No distress noted. Son is at the bedside. Continue to monitor. 2150  Patient is awake, eyes open. Oriented to self and place. She took her po meds. IV cardizem started as ordered. 0330  Cardizem rate increased to 5mg/hr as ordered. 0410  Patient is alert, confused and agitated. She spit her cough medicine out. Son is at the bedside. 0600  Patient is in bed, eyes closed. No distress noted, son is at the bedside. 0730  Bedside, Verbal and Written shift change report given to [facility] (oncoming nurse) by Katelyn Aguilar (offgoing nurse). Report included the following information SBAR, Kardex, MAR and Accordion.

## 2020-01-19 NOTE — PROGRESS NOTES
Patient converted from A fib to NSR with rate in 70s and low 80s. PFM notified. Keep cardizem drip on at this time and continue to monitor.

## 2020-01-20 NOTE — PROGRESS NOTES
[facility]  -  Progress Note Patient: Annie Toribio MRN: [id number]  SSN: [id number] YOB: 1933  Age: 80 y.o. Sex: female Admit Date: 1/16/2020 Assessment:  
 
Hospital Problems  Date Reviewed: 6/6/2019 Codes Class Noted POA  
 CAP (community acquired pneumonia) ICD-10-CM: J18.9 ICD-9-CM: 868  1/17/2020 Unknown * (Principal) Sepsis due to pneumonia [facility]) ICD-10-CM: J18.9, A41.9 ICD-9-CM: 583, 995.91  1/17/2020 Unknown Hyponatremia ICD-10-CM: E87.1 ICD-9-CM: 276.1  1/16/2020 Unknown A-fib [facility]) ICD-10-CM: I48.91 
ICD-9-CM: 427.31  1/16/2020 Unknown Pneumonia ICD-10-CM: J18.9 ICD-9-CM: 771  1/16/2020 Unknown Plan:  
 
1. In view of PAF I would recommend long-term anticoagulation with 1 of the newer oral anticoagulation agents such as Eliquis which could be changed prior to discharge. She is currently on therapeutic dose Lovenox. 2.  In view of elevation of blood pressure in the 190-907 systolic range I would change her Cardizem to  twice daily start and she has more breakthrough atrial fibrillation I would consider increasing Lopressor to 50 mg twice daily. Subjective: No new complaints. Coughing less. Still having some runs of atrial flutter with breathing treatment this AM up to 150, but now still in atrial flutter after breathing treatment though heart rate 110. In talking to a family member who I believe was a daughter she relates that the confusion that the patient had largely resolved and that normally the patient has no confusion at all and is able to function normally. Objective:  
  
Patient Vitals for the past 8 hrs: 
 Temp Pulse Resp BP SpO2  
01/20/20 1638 (P) 97.3 °F (36.3 °C) (!) (P) 118 (P) 20 (P) 146/85 (P) 95 % 01/20/20 1145 98.3 °F (36.8 °C) 100 20 149/84 90 % Patient Vitals for the past 96 hrs: 
 Weight  
01/20/20 0459 69 kg (152 lb 1.6 oz) 01/19/20 0402 68 kg (150 lb) 01/18/20 0445 69.5 kg (153 lb 3.2 oz) 01/17/20 0853 63.5 kg (140 lb) Intake/Output Summary (Last 24 hours) at 1/20/2020 1751 Last data filed at 1/20/2020 1343 Gross per 24 hour Intake 460 ml Output 700 ml Net -240 ml Physical Exam: 
General:  alert, cooperative, no distress, appears stated age Neck:  no JVD Lungs:  clear to auscultation bilaterally Heart:  regular rate and rhythm, S1, S2 normal, no murmur, click, rub or gallop Abdomen:  abdomen is soft without significant tenderness, masses, organomegaly or guarding Extremities:  extremities normal, atraumatic, no cyanosis or edema Data Review:  
 
Labs: Results:  
   
Chemistry Recent Labs  
  01/20/20 
0526 01/19/20 
0119 01/18/20 
0023 * 116* 106*  135* 134* K 3.6 3.8 4.1  104 102 CO2 27 23 23 BUN 18 19* 23* CREA 0.53* 0.50* 0.56* CA 9.0 9.1 9.0 MG 2.1 2.2 2.1 AGAP 5 8 9 BUCR 34* 38* 41* CBC w/Diff Recent Labs  
  01/20/20 
0526 01/19/20 
0119 01/18/20 
0023 WBC 16.4* 20.1* 23.5*  
RBC 3.27* 3.22* 3.28* HGB 10.6* 10.2* 10.6* HCT 31.4* 30.7* 30.7*  352 401 GRANS 86* 91* 90* LYMPH 4* 2* 8*  
EOS 0 0 0 Cardiac Enzymes No results found for: CPK, CK, CKMMB, CKMB, RCK3, CKMBT, CKNDX, CKND1, MAYITO, TROPT, TROIQ, CARRIE, TROPT, TNIPOC, BNP, BNPP Coagulation No results for input(s): PTP, INR, APTT, INREXT, INREXT in the last 72 hours. Lipid Panel No results found for: CHOL, CHOLPOCT, CHOLX, CHLST, CHOLV, 080177, HDL, HDLP, LDL, LDLC, DLDLP, 379038, VLDLC, VLDL, TGLX, TRIGL, TRIGP, TGLPOCT, CHHD, CHHDX  
BNP No results found for: BNP, BNPP, XBNPT Liver Enzymes No results for input(s): TP, ALB, TBIL, AP, SGOT, GPT in the last 72 hours. No lab exists for component: DBIL Digoxin Thyroid Studies Lab Results Component Value Date/Time TSH 2.63 01/17/2020 05:30 AM  
    
 
Clemente Allred, DO  
January 20, 2020

## 2020-01-20 NOTE — ROUTINE PROCESS
Bedside shift change report given to Rodriguez Gaona (oncoming nurse) by Prisca (offgoing nurse). Report included the following information SBAR, Kardex and MAR.

## 2020-01-20 NOTE — HOME CARE
Rounded on this \"[facility]\" patient, explained [facility] services to patient's daughter Kamla Camp) and left her a brochure on [facility] . HALEIGH RODRIGUEZ.

## 2020-01-20 NOTE — PROGRESS NOTES
Problem: Falls - Risk of 
Goal: *Absence of Falls Description Document Mariana Carbajal Fall Risk and appropriate interventions in the flowsheet. Outcome: Progressing Towards Goal 
Note: Fall Risk Interventions: 
Mobility Interventions: Bed/chair exit alarm, Patient to call before getting OOB Mentation Interventions: Adequate sleep, hydration, pain control, Evaluate medications/consider consulting pharmacy Medication Interventions: Evaluate medications/consider consulting pharmacy Elimination Interventions: Bed/chair exit alarm, Call light in reach Problem: Patient Education: Go to Patient Education Activity Goal: Patient/Family Education Outcome: Progressing Towards Goal 
  
Problem: Pressure Injury - Risk of 
Goal: *Prevention of pressure injury Description Document Tristen Scale and appropriate interventions in the flowsheet. Outcome: Progressing Towards Goal 
Note: Pressure Injury Interventions: 
Sensory Interventions: Assess changes in LOC, Maintain/enhance activity level, Keep linens dry and wrinkle-free Moisture Interventions: Absorbent underpads Activity Interventions: Pressure redistribution bed/mattress(bed type) Mobility Interventions: Pressure redistribution bed/mattress (bed type) Nutrition Interventions: Document food/fluid/supplement intake Friction and Shear Interventions: Apply protective barrier, creams and emollients, HOB 30 degrees or less Problem: Patient Education: Go to Patient Education Activity Goal: Patient/Family Education Outcome: Progressing Towards Goal 
  
Problem: Pneumonia: Day 3 Goal: Activity/Safety Outcome: Progressing Towards Goal 
Goal: Consults, if ordered Outcome: Progressing Towards Goal 
Goal: Diagnostic Test/Procedures Outcome: Progressing Towards Goal 
Goal: Nutrition/Diet Outcome: Progressing Towards Goal 
Goal: Discharge Planning Outcome: Progressing Towards Goal 
Goal: Medications Outcome: Progressing Towards Goal 
Goal: Respiratory Outcome: Progressing Towards Goal 
Goal: Treatments/Interventions/Procedures Outcome: Progressing Towards Goal 
Goal: Psychosocial 
Outcome: Progressing Towards Goal 
Goal: *Oxygen saturation within defined limits Outcome: Progressing Towards Goal 
Goal: *Hemodynamically stable Outcome: Progressing Towards Goal 
Goal: *Demonstrates progressive activity Outcome: Progressing Towards Goal 
Goal: *Tolerating diet Outcome: Progressing Towards Goal 
Goal: *Describes available resources and support systems Outcome: Progressing Towards Goal 
Goal: *Optimal pain control at patient's stated goal 
Outcome: Progressing Towards Goal 
  
Problem: Pneumonia: Day 4 Goal: Activity/Safety Outcome: Progressing Towards Goal 
Goal: Nutrition/Diet Outcome: Progressing Towards Goal 
Goal: Discharge Planning Outcome: Progressing Towards Goal 
Goal: Medications Outcome: Progressing Towards Goal 
Goal: Respiratory Outcome: Progressing Towards Goal 
Goal: Treatments/Interventions/Procedures Outcome: Progressing Towards Goal 
Goal: Psychosocial 
Outcome: Progressing Towards Goal 
  
Problem: Pneumonia: Discharge Outcomes Goal: *Demonstrates progressive activity Outcome: Progressing Towards Goal 
Goal: *Describes follow-up/return visits to physicians Outcome: Progressing Towards Goal 
Goal: *Tolerating diet Outcome: Progressing Towards Goal 
Goal: *Verbalizes name, dosage, time, side effects, and number of days to continue medications Outcome: Progressing Towards Goal 
Goal: *Influenza immunization Outcome: Progressing Towards Goal 
Goal: *Pneumococcal immunization Outcome: Progressing Towards Goal 
Goal: *Respiratory status at baseline Outcome: Progressing Towards Goal 
Goal: *Vital signs within defined limits Outcome: Progressing Towards Goal 
Goal: *Describes available resources and support systems Outcome: Progressing Towards Goal 
 Goal: *Optimal pain control at patient's stated goal 
Outcome: Progressing Towards Goal 
  
Problem: Patient Education: Go to Patient Education Activity Goal: Patient/Family Education Outcome: Progressing Towards Goal 
  
Problem: Afib Pathway: Day 1 Goal: Consults, if ordered Outcome: Progressing Towards Goal 
Goal: Diagnostic Test/Procedures Outcome: Progressing Towards Goal 
Goal: Medications Outcome: Progressing Towards Goal 
Goal: Treatments/Interventions/Procedures Outcome: Progressing Towards Goal 
Goal: Psychosocial 
Outcome: Progressing Towards Goal 
  
Problem: Patient Education: Go to Patient Education Activity Goal: Patient/Family Education Outcome: Progressing Towards Goal 
  
Problem: Patient Education: Go to Patient Education Activity Goal: Patient/Family Education Outcome: Progressing Towards Goal

## 2020-01-20 NOTE — PROGRESS NOTES
Occupational Therapy Note Patient: Shena Rosado (42 y.o. female) Date: 1/20/2020 Diagnosis: A-fib ([facility] [address] [I48.91] Pneumonia [J18.9] Hyponatremia [E87.1] CAP (community acquired pneumonia) [J18.9] Sepsis due to pneumonia ([facility] [address] Precautions:   
Chart, occupational therapy assessment, plan of care, and goals were reviewed. Occupational Therapy treatment attempted. Patient is unable to participate due to: 
[]  Nausea/vomiting 
[]  Eating 
[]  Pain 
[]  Pt lethargic 
[]  Off Unit 
[x] Other: 
Attempt @ [id number], pt with daughter in room. Pt very sleepy, only opening eyes to verbal stimulus and stating, \"I won't be here long enough to eat\" when attempting to encourage pt to sit EOB to eat lunch. Daughter assisted with adjusting pt to [facility] Max A x2. At this time, pt too fatigued for skilled therapy. Attempt x2 @ 1931, pt remains in/out of sleep with O2 NC doffed from nose. Re-donned for pt and pt became perseverative on fixing NC. Assisted pt with encouragement to keep on her face. Daughter remains in room and appropriate with assisting pt. Pt too fatigued for skilled OT services. Will f/u later as schedule allows. Thank you.  
 
RACHAEL Patel/BRIELLE

## 2020-01-20 NOTE — PROGRESS NOTES
[address] I Progress Note Patient: Dara Vasquez MRN: [id number] SSN: [id number]  YOB: 1933 Age: 80 y.o. Sex: female Admit Date: 1/16/2020 LOS: 4 days Chief Complaint Patient presents with  Shortness of Breath  Irregular Heart Beat RVR Subjective:  
Acute: None Pt resting comfortably after poor sleep overnight due to increased coughing. Robitussin not effective. Pts son at bedside this AM. Reports ongoing confusion but pt took all her PO meds. Breathing comfortably on 4L NC, not coughing while I was present. ROS: unable to obtain due to on going delirium. Hx assisted by son who stayed overnight. Objective:  
 
Visit Vitals /84 (BP 1 Location: Left arm, BP Patient Position: At rest) Pulse 100 Temp 98.3 °F (36.8 °C) Resp 20 Ht 4' 10\" (1.473 m) Wt 69 kg (152 lb 1.6 oz) SpO2 90% Breastfeeding No  
BMI 31.79 kg/m² Physical Exam:  
General appearance: resting comofrtably HEENT: Moist mucus membranes Lungs: mild exp wheezes b/l with good air movement, no crackles Heart: regular rate and rhythm, S1, S2 normal, no murmur, click, rub or gallop Abdomen: soft, non-tender. Bowel sounds normal. No masses,  no organomegaly Pulses: 2+ and symmetric Skin: Skin color, texture, turgor normal. No rashes or lesions Neuro:  Resting comfortably, lethargic, orientedx1 Exremities:(-) LL edema b/l Intake and Output: 
Current Shift: No intake/output data recorded. Last three shifts: 01/18 1901 - 01/20 0700 In: 283 [P.O.:760; I.V.:31] Out: 1025 [Urine:1025] Lab/Data Review: 
Recent Results (from the past 12 hour(s)) METABOLIC PANEL, BASIC Collection Time: 01/20/20  5:26 AM  
Result Value Ref Range Sodium 136 136 - 145 mmol/L Potassium 3.6 3.5 - 5.5 mmol/L Chloride 104 100 - 111 mmol/L  
 CO2 27 21 - 32 mmol/L Anion gap 5 3.0 - 18 mmol/L Glucose 138 (H) 74 - 99 mg/dL  BUN 18 7.0 - 18 MG/DL  
 Creatinine 0.53 (L) 0.6 - 1.3 MG/DL  
 BUN/Creatinine ratio 34 (H) 12 - 20 GFR est AA >60 >60 ml/min/1.73m2 GFR est non-AA >60 >60 ml/min/1.73m2 Calcium 9.0 8.5 - 10.1 MG/DL MAGNESIUM Collection Time: 01/20/20  5:26 AM  
Result Value Ref Range Magnesium 2.1 1.6 - 2.6 mg/dL CBC WITH AUTOMATED DIFF Collection Time: 01/20/20  5:26 AM  
Result Value Ref Range WBC 16.4 (H) 4.6 - 13.2 K/uL  
 RBC 3.27 (L) 4.20 - 5.30 M/uL  
 HGB 10.6 (L) 12.0 - 16.0 g/dL HCT 31.4 (L) 35.0 - 45.0 % MCV 96.0 74.0 - 97.0 FL  
 MCH 32.4 24.0 - 34.0 PG  
 MCHC 33.8 31.0 - 37.0 g/dL  
 RDW 12.2 11.6 - 14.5 % PLATELET 009 053 - 925 K/uL MPV 8.9 (L) 9.2 - 11.8 FL  
 NEUTROPHILS 86 (H) 42 - 75 % BAND NEUTROPHILS 4 0 - 5 % LYMPHOCYTES 4 (L) 20 - 51 % MONOCYTES 6 2 - 9 % EOSINOPHILS 0 0 - 5 % BASOPHILS 0 0 - 3 %  
 ABS. NEUTROPHILS 14.7 (H) 1.8 - 8.0 K/UL  
 ABS. LYMPHOCYTES 0.7 (L) 0.8 - 3.5 K/UL  
 ABS. MONOCYTES 1.0 0 - 1.0 K/UL  
 ABS. EOSINOPHILS 0.0 0.0 - 0.4 K/UL  
 ABS. BASOPHILS 0.0 0.0 - 0.06 K/UL  
 DF MANUAL PLATELET COMMENTS ADEQUATE PLATELETS    
 RBC COMMENTS NORMOCYTIC, NORMOCHROMIC Assessment and Plan:  
 
80 y.o. female with PMH significant for A-fib, HLD, HTN, SLE now admitted with cough x2 wks. Sepsis 2/2 CAP - Improving SIRS: , RR 44, WBC 23.0 subjective temp 100.9 w/likely source of infx: PNA. Pt  Reporting h/o URI and productive cough x2 wks. SOB w/ diffuse wheeze and crackles on admission. CXR demonstrated interstitial pnuemonitis. Started azithromycin and ceftriaxone in ED. Pt continues to improve over this admission [facility] 16.4 today. Bcx remain negative. Pt still has wheeze on exam today but resp status is improving. Dec to 2L NC this AM. PLAN 
-stat duo-neb treatment now, then scheduled [id number] 
-continue azithromycin and ceftriaxone for at least 5 days (Day#4/5) -f/u blood cx x2 
-switch to robitussin AC scheduled for cough A-fib w/RVR - On admission tachy w/ rates to 140s w/EKG showing A-fib w/RVR. New diagnosis for patient. CHADSVASC of 2. Coagulated in ED. Therapeutic Lovenox: 60mg. Cardiology consulted. Started on dilt drip and metoprolol that was transitioned to PO diltiazem. Converted to NSR (1/19). Pt has hx of jin on metoprolol, will touch base w/ card for d/c recs. PLAN 
-continue PO dilt 45mg q6hr 
-metoprolol 25 bid 
-continue therapeutic lovenox  
-cards recs appreciated 
  
HTN - systolic range 923-305 / diastolic range 061-01 PLAN 
-continue dilt ggt 
-hold amlodipine 5mg qday 
-consider adding home losartan if Bps still elevated 
-consider labetalol if hi BP persists greater than 180/110 Hyponatremia -  Na 125 on admission. Received NS, improved to greater than 130 Ser Osm:275(low)  Alicia:15 Urine Osm:554 PLAN 
-continue fluid restriction of 1200 
  
UTI - Asymptomatic.  UA showed moderate blood, Trace LE, w/4+ bacteria and 3+ epithelial cells. Presence of epithelial cells may be suggestive of contaminated sample. PLAN 
-continue abx as above will provide coverage for complicated UTI 
-continue cranberry supplement 450mg qday 
  
Hx of SLE, but no home  prescribed meds PLAN 
-continue home meds: Omega 3, Vit C, Vit D, Vit E 
  
Trigeminal neuralgia PLAN 
-continue carbamazepine 100mg ER BID HLD PLAN 
-continue atorvastatin 10mg 
  
Dispo: pt lives at home w/ daughter spending nights. -FU PT/OT assessment, snf vs  Stefanie Regan MD PGY-1 
[address]

## 2020-01-20 NOTE — PROGRESS NOTES
Reason for Admission:  A-fib [facility]) [I48.91] Pneumonia [J18.9] Hyponatremia [E87.1] CAP (community acquired pneumonia) [J18.9] RRAT Score:    8 Plan for utilizing home health:    yes Likelihood of Readmission:   LOW Transition of Care Plan:         
 
 
Initial assessment completed with patient and relative(s). Cognitive status of patient: oriented to time, place, person and situation. Face sheet information confirmed:  yes. The patient designates Daughter Surekha Small [phone] to participate in her discharge plan and to receive any needed information. This patient lives in a single family home with patient and daughter. Patient is able to navigate steps as needed. Prior to hospitalization, patient was considered to be independent with ADLs/IADLS : yes . Patient has a current ACP document on file: no The patient and daughter will be available to transport patient home upon discharge. The patient already has [facility], [facility], [facility], [facility], [facility],  medical equipment available in the home. Patient is not currently active with home health. Patient has not stayed in a skilled nursing facility or rehab. This patient is on dialysis :no 
 
List of available Home Health agencies were provided and reviewed with the patient prior to discharge. Freedom of choice signed: yes, for [facility]. Currently, the discharge plan is Home with [address] [place] [address] Neel Guthrie. The patient states that she can obtain her medications from the pharmacy, and take her medications as directed. Patient's current insurance is Medicare and [employer] Care Management Interventions PCP Verified by CM: Yes Mode of Transport at Discharge: Self Physical Therapy Consult: Yes Occupational Therapy Consult: Yes Current Support Network: Lives with Caregiver Confirm Follow Up Transport: Family The Plan for Transition of Care is Related to the Following Treatment Goals : Home health The Patient and/or Patient Representative was Provided with a Choice of Provider and Agrees with the Discharge Plan?: Yes Freedom of Choice List was Provided with Basic Dialogue that Supports the Patient's Individualized Plan of Care/Goals, Treatment Preferences and Shares the Quality Data Associated with the Providers?: Yes Discharge Location Discharge Placement: Home with home health Wilian Lau RN BSN Care Manager [phone]

## 2020-01-20 NOTE — ROUTINE PROCESS
1915 Assumed care of patient from [facility] (off going). Patient resting in bed with no distress. Bed in lowest position, side rails up x3, call bell and personal belongings within reach. Will continue to monitor. 0720 Bedside shift change report given to [facility] RN (oncoming nurse) by Jo Mcclellan RN (offgoing nurse). Report included the following information SBAR, Kardex, Intake/Output, MAR, Recent Results and Cardiac Rhythm Telemetry box #6, NSR on monitor.

## 2020-01-21 NOTE — PROGRESS NOTES
[facility] - Progress Note Admit Date: 1/16/2020 The patient was seen, examined, and independently evaluated and I agree with the below assessment and plan by Almaz Clifton PA-C with the following comments. This lady went into atrial flutter-fibrillation yesterday at the time of a breathing treatment and has remained in this rhythm with a somewhat increased ventricular response since that time. I agree with seeing how her heart rate does on Cardizem  mg twice daily until this PM and if heart rate is still not controlled would consider increasing Lopressor to 50 mg twice daily as recommended below. Assessment:  
 
Hospital Problems  Date Reviewed: 6/6/2019 Codes Class Noted POA  
 CAP (community acquired pneumonia) ICD-10-CM: J18.9 ICD-9-CM: 774  1/17/2020 Unknown * (Principal) Sepsis due to pneumonia [facility]) ICD-10-CM: J18.9, A41.9 ICD-9-CM: 532, 995.91  1/17/2020 Unknown Hyponatremia ICD-10-CM: E87.1 ICD-9-CM: 276.1  1/16/2020 Unknown A-fib [facility]) ICD-10-CM: I48.91 
ICD-9-CM: 427.31  1/16/2020 Unknown Pneumonia ICD-10-CM: J18.9 ICD-9-CM: 477  1/16/2020 Unknown  
   
  
 
 
-Sepsis likely secondary to Community Acquired Pneumonia along with possible UTI. -Atrial fibrillation with RVR. New diagnosis. Likely driven by underlying pulmonary process.  
-H/o syncope/bradycardia while on BB 7/2018. Patient underwent a pharmacologic nuclear stress test, echo and a 30-day event monitor at that time, all of which were normal.  
-Hypertension. Stable. -Hyperlipidemia. On statin. -SL with lupus nephritis. 
-Chronic HAJI. -Chronic pain, fibromyalgia. 
-Anxiety, depression, current delirium? 
-Normal EF 60-65% by echo this admission. 
  
Primary cardiologist Dr. Sb Matos. Plan:  
 
HR elevated this AM. Tolerating oral medications. Cardizem transitioned to long acting and increased this AM. Continued on lopressor. Will give morning medications now and if HR remains elevated and BP stable, will increase lopressor to 50 BID. Will stop cozaar which has been held to allow for up titration of AV claudia medications. Patient is continued on lovenox at this time. Will need to continue to discuss candidacy of long term [address] with patient and family. Subjective:  
 
Coughing has improved. AMS appears to wax and wane. Objective:  
  
Patient Vitals for the past 8 hrs: 
 Temp Pulse Resp BP SpO2  
01/21/20 0747 98.7 °F (37.1 °C) (!) 120 20 134/78 96 % 01/21/20 0405 98.3 °F (36.8 °C) 97 19 146/63 95 % Patient Vitals for the past 96 hrs: 
 Weight  
01/21/20 0548 68.5 kg (151 lb 0.2 oz) 01/20/20 0459 69 kg (152 lb 1.6 oz) 01/19/20 0402 68 kg (150 lb) 01/18/20 0445 69.5 kg (153 lb 3.2 oz) Intake/Output Summary (Last 24 hours) at 1/21/2020 3439 Last data filed at 1/21/2020 6083 Gross per 24 hour Intake 640 ml Output 350 ml Net 290 ml Physical Exam: 
General:  alert, cooperative, no distress, appears stated age Neck:  no JVD Lungs:  clear to auscultation bilaterally Heart:  irregularly irregular rhythm Abdomen:  abdomen is soft without significant tenderness, masses, organomegaly or guarding Extremities:  extremities normal, atraumatic, no cyanosis or edema Data Review:  
 
Labs: Results:  
   
Chemistry Recent Labs  
  01/21/20 0053 01/20/20 
0526 01/19/20 
0119 * 138* 116*  136 135* K 4.0 3.6 3.8  104 104 CO2 29 27 23 BUN 14 18 19* CREA 0.46* 0.53* 0.50* CA 8.9 9.0 9.1 MG 2.0 2.1 2.2 AGAP 5 5 8 BUCR 30* 34* 38* CBC w/Diff Recent Labs  
  01/21/20 0053 01/20/20 
0526 01/19/20 
0119 WBC 15.0* 16.4* 20.1*  
RBC 3.25* 3.27* 3.22* HGB 10.5* 10.6* 10.2* HCT 31.7* 31.4* 30.7*  395 352 GRANS 84* 86* 91* LYMPH 6* 4* 2* EOS 0 0 0 Cardiac Enzymes No results found for: CPK, CK, CKMMB, CKMB, RCK3, CKMBT, CKNDX, CKND1, MAYITO, TROPT, TROIQ, CARRIE, TROPT, TNIPOC, BNP, BNPP Coagulation No results for input(s): PTP, INR, APTT, INREXT, INREXT in the last 72 hours. Lipid Panel No results found for: CHOL, CHOLPOCT, CHOLX, CHLST, CHOLV, 737506, HDL, HDLP, LDL, LDLC, DLDLP, 233478, VLDLC, VLDL, TGLX, TRIGL, TRIGP, TGLPOCT, CHHD, CHHDX  
BNP No results found for: BNP, BNPP, XBNPT Liver Enzymes No results for input(s): TP, ALB, TBIL, AP, SGOT, GPT in the last 72 hours. No lab exists for component: DBIL Digoxin Thyroid Studies Lab Results Component Value Date/Time TSH 2.63 01/17/2020 05:30 AM  
    
 
Signed By: Nara Orellana DO   
 January 21, 2020

## 2020-01-21 NOTE — PROGRESS NOTES
Received report on pt.from off going RN. Resting quietly in bed on rounds. Remains confused. Son at bedside. Bed alarms on. Side rails up x 3. coughing very frequently, moist, non productive. HOB elevated. 02 on. No acute distress noted. Will cont to monitor for any changes in status. 1000 am meds and prn cough meds given as well as PRN HHN treatment. 1100 pt sleeping on and off with less coughing. 1600 family remains at bedside. 1730 coughing more and exp wheezes. HHN treatment given as well as Tessalon ish and robitussin. HOB elevated. 1945 Bedside and Verbal shift change report given to Jack (oncoming nurse) by Armond Pearson RN (offgoing nurse). Report given with Chito FINCH and MAR.

## 2020-01-21 NOTE — PROGRESS NOTES
Problem: Self Care Deficits Care Plan (Adult) Goal: *Acute Goals and Plan of Care (Insert Text) Description Occupational Therapy Goals Initiated 1/19/2020 within 7 day(s). 1.  Patient will perform lower body dressing with modified independence and no c/o increased pain 2. Patient will perform grooming with modified independence while standing at the sink 3. Patient will perform bathing with modified independence and no c/o increased pain 4. Patient will perform toilet transfers with modified independence. 5.  Patient will perform all aspects of toileting with modified independence. Outcome: Progressing Towards Goal 
 OCCUPATIONAL THERAPY TREATMENT Patient: Ivone Sellers (10 y.o. female) Date: 1/21/2020 Diagnosis: A-fib ([facility] [address] [I48.91] Pneumonia [J18.9] Hyponatremia [E87.1] CAP (community acquired pneumonia) [J18.9] Sepsis due to pneumonia ([facility] [address] Precautions:   
 
Chart, occupational therapy assessment, plan of care, and goals were reviewed. ASSESSMENT: 
Pt sitting up in bed with daughter present reporting pt is more alert today. Pt A&Ox4, however did report feeling tired after taking medication. With encouragement from therapist and daughter, pt agreeable to getting up to chair to increase endurance. CGA progressing to SBA for bed mobility/maneuvering to EOB. Once EOB, pt able to stand given CGA and HHA for transfer to bedside chair. Once in chair, pt reported needing to use restroom. Stand-step to [facility] placed next to chair CGA with HHA. She completed toileting with increased time and hygiene in standing with SBA. Pt returned to chair with alarm in place and all needs in reach. Daughter remained in room. Education on increasing OOB activity to increase strength/endurance and a regular circadian rhythm with recommendation for Home Health; daughter verbalized understanding. Progression toward goals: 
[x]          Improving appropriately and progressing toward goals []          Improving slowly and progressing toward goals 
[]          Not making progress toward goals and plan of care will be adjusted PLAN: 
Patient continues to benefit from skilled intervention to address the above impairments. Continue treatment per established plan of care. Discharge Recommendations:  Home Health with 24/7 Supervision Further Equipment Recommendations for Discharge:  3:1 commode SUBJECTIVE:  
Patient stated I am feeling tired.  OBJECTIVE DATA SUMMARY:  
Cognitive/Behavioral Status: 
Neurologic State: Alert Orientation Level: Oriented X4 Cognition: Decreased attention/concentration, Impulsive, Poor safety awareness Safety/Judgement: Fall prevention Functional Mobility and Transfers for ADLs: 
Bed Mobility: 
Rolling: Contact guard assistance Supine to Sit: Stand-by assistance Scooting: Contact guard assistance Transfers: 
Sit to Stand: Contact guard assistance Stand to Sit: Contact guard assistance Bed to Chair: Contact guard assistance Toilet Transfer : Contact guard assistance Bathroom Mobility: Contact guard assistance Balance: 
Sitting: Intact Sitting - Static: Good (unsupported) Sitting - Dynamic: Fair (occasional) Standing: Impaired; With support Standing - Static: Fair Standing - Dynamic : Fair ADL Intervention: 
Grooming Grooming Assistance: Modified independent Position Performed: Seated in chair Washing Hands: Modified independent(hand wipes ) Toileting Toileting Assistance: Stand-by assistance Bladder Hygiene: Stand-by assistance Clothing Management: Stand-by assistance Cognitive Retraining Safety/Judgement: Fall prevention Pain: 
Pain level pre-treatment: 0/10 Pain level post-treatment: 0/10 Activity Tolerance:   
Fair Please refer to the flowsheet for vital signs taken during this treatment. After treatment:  
[x]  Patient left in no apparent distress sitting up in chair with chair alarm []  Patient left in no apparent distress in bed 
[x]  Call bell left within reach [x]  Nursing notified 
[x]  Daughter present 
[]  Bed alarm activated COMMUNICATION/EDUCATION:  
[x] Role of Occupational Therapy in the acute care setting 
[x] Home safety education was provided and the patient/caregiver indicated understanding. [x] Patient/family have participated as able in working towards goals and plan of care. [] Patient/family agree to work toward stated goals and plan of care. [] Patient understands intent and goals of therapy, but is neutral about his/her participation. [] Patient is unable to participate in goal setting and plan of care. Thank you for this referral. 
RACHAEL Villegas/L Time Calculation: 32 mins

## 2020-01-21 NOTE — PROGRESS NOTES
[address] Progress Note Patient: Danielle Whitaker MRN: [id number] SSN: [id number]  YOB: 1933 Age: 80 y.o. Sex: female Admit Date: 1/16/2020 LOS: 5 days Chief Complaint Patient presents with  Shortness of Breath  Irregular Heart Beat RVR Subjective:  
Acute: None Pt resting comfortably, lethargic this AM after poor sleep overnight. Daughter at bedside. Daughter reports pt was up till 2am. Less confusion o/n then previous nights. Pt is sleepy but appropriately responsive. Decreased coughing with the addition of tessalon yesterday. Breathing comfortably on 4L NC. Daughter also reports pt did not eat lunch/dinner but did eat pizza late last night. ROS: 
General: +fatigue, no chills, dry mouth Cardio: no CP, +palpitations Resp: No SOB, +cough Abdo: No pain, no N/V/D/C MSK: no pain Objective:  
 
Visit Vitals /78 (BP 1 Location: Right arm, BP Patient Position: At rest) Pulse (!) 120 Temp 98.7 °F (37.1 °C) Resp 20 Ht 4' 10\" (1.473 m) Wt 68.5 kg (151 lb 0.2 oz) SpO2 96% Breastfeeding No  
BMI 31.56 kg/m² Physical Exam:  
General appearance: resting comfortably, lethargic HEENT: dry mucus membranes Lungs: mild exp wheezes b/l with good air movement, no crackles Heart: regular rate but tachy, S1, S2 normal, no murmur, click, rub or gallop Abdomen: soft, non-tender. Bowel sounds normal. No masses,  no organomegaly Pulses: 2+ and symmetric Skin: Skin color, texture, turgor normal. No rashes or lesions Neuro:  orientedx1 Exremities:(-) LL edema b/l Intake and Output: 
Current Shift: No intake/output data recorded. Last three shifts: 01/19 1901 - 01/21 0700 In: 860 [P.O.:860] Out: 4005 [[id number]] Lab/Data Review: 
Recent Results (from the past 12 hour(s)) METABOLIC PANEL, BASIC Collection Time: 01/21/20 12:53 AM  
Result Value Ref Range  Sodium 139 136 - 145 mmol/L  
 Potassium 4.0 3.5 - 5.5 mmol/L Chloride 105 100 - 111 mmol/L  
 CO2 29 21 - 32 mmol/L Anion gap 5 3.0 - 18 mmol/L Glucose 119 (H) 74 - 99 mg/dL BUN 14 7.0 - 18 MG/DL Creatinine 0.46 (L) 0.6 - 1.3 MG/DL  
 BUN/Creatinine ratio 30 (H) 12 - 20 GFR est AA >60 >60 ml/min/1.73m2 GFR est non-AA >60 >60 ml/min/1.73m2 Calcium 8.9 8.5 - 10.1 MG/DL MAGNESIUM Collection Time: 01/21/20 12:53 AM  
Result Value Ref Range Magnesium 2.0 1.6 - 2.6 mg/dL CBC WITH AUTOMATED DIFF Collection Time: 01/21/20 12:53 AM  
Result Value Ref Range WBC 15.0 (H) 4.6 - 13.2 K/uL  
 RBC 3.25 (L) 4.20 - 5.30 M/uL  
 HGB 10.5 (L) 12.0 - 16.0 g/dL HCT 31.7 (L) 35.0 - 45.0 % MCV 97.5 (H) 74.0 - 97.0 FL  
 MCH 32.3 24.0 - 34.0 PG  
 MCHC 33.1 31.0 - 37.0 g/dL  
 RDW 12.5 11.6 - 14.5 % PLATELET 467 265 - 834 K/uL MPV 9.1 (L) 9.2 - 11.8 FL  
 NEUTROPHILS 84 (H) 40 - 73 % LYMPHOCYTES 6 (L) 21 - 52 % MONOCYTES 10 3 - 10 % EOSINOPHILS 0 0 - 5 % BASOPHILS 0 0 - 2 %  
 ABS. NEUTROPHILS 12.5 (H) 1.8 - 8.0 K/UL  
 ABS. LYMPHOCYTES 1.0 0.9 - 3.6 K/UL  
 ABS. MONOCYTES 1.5 (H) 0.05 - 1.2 K/UL  
 ABS. EOSINOPHILS 0.1 0.0 - 0.4 K/UL  
 ABS. BASOPHILS 0.0 0.0 - 0.1 K/UL  
 DF AUTOMATED Assessment and Plan:  
 
80 y.o. female with PMH significant for A-fib, HLD, HTN, SLE now admitted with CAP and new onset Afib w/ RVR. Sepsis 2/2 CAP - Improving SIRS: , RR 44, WBC 23.0 subjective temp 100.9 w/likely source of infx: PNA. Pt  Reporting h/o URI and productive cough x2 wks. SOB w/ diffuse wheeze and crackles on admission. CXR demonstrated interstitial pnuemonitis. Started IV azithromycin and ceftriaxone in ED. Pt continues to improve over this admission [address] [facility] [facility] 16.4 today. Bcx remain negative. Pt still has wheeze on exam today but resp status is improving. Breathing comfortable 2L NC.   
PLAN 
-Duo-neb prn 
-continue azithromycin and ceftriaxone for 7 days (Day#5/7)-> consider transition to PO today azithro and cefuroxime 500mg BID  
-f/u blood Cx 
-robitussin AC scheduled for cough A-fib w/RVR - On admission tachy w/ rates to 140s w/EKG showing A-fib w/RVR. New diagnosis for patient. CHADSVASC of 2. Coagulated in ED. Therapeutic Lovenox: 60mg. Cardiology consulted. Started on dilt drip and metoprolol that was transitioned to PO diltiazem. Converted to NSR (1/19). Pt still has runs of aflutter w/ duo-nebs. Transitioned to long acting Cardizem 120mg BID (1/20). Continues to be tachy this AM. Need to discuss long-term AC with PT and family. PLAN 
- continue diltiazem 120mg BID 
-metoprolol 25 bid-> cards to increased to 50mg BID if still tachy after morning meds 
-continue therapeutic lovenox  
-cards recs appreciated 
-will hold duo-nebs for now. ipratropium nebs if needed.  
-discuss starting apixiban  
  
HTN - systolic range 993/14J today. PLAN 
-hold amlodipine 5mg qday. Re-start if needed. 
-hold home losartan  
-consider labetalol if hi BP persists greater than 180/110 Hyponatremia -  Na 125 on admission. Received NS, improved to greater than 130 Ser Osm:275(low)  Alicia:15 Urine Osm:554. Na 139 (1/21). D/c fluid restriction. PLAN 
-daily BMP 
  
UTI - Asymptomatic.  UA showed moderate blood, Trace LE, w/4+ bacteria and 3+ epithelial cells. Presence of epithelial cells may be suggestive of contaminated sample. Ucx not drawn. PLAN 
-continue abx as above will provide coverage for complicated UTI 
-continue cranberry supplement 450mg qday 
  
Hx of SLE, but no home  prescribed meds PLAN 
-continue home meds: Omega 3, Vit C, Vit D, Vit E 
  
Trigeminal neuralgia PLAN 
-continue carbamazepine 100mg ER BID HLD PLAN 
-continue atorvastatin 10mg 
  
Dispo: pt lives at home w/ daughter spending nights. -FU PT/OT assessment, snf vs  Lynne Lynn MD PGY-1 
[address]

## 2020-01-21 NOTE — PROGRESS NOTES
conducted an initial consultation and Spiritual Assessment for Amy Braga, who is a 80 y.o.,female. Patient's Primary Language is: [place]. According to the patient's EMR Baptist Affiliation is: No Samaritan. The reason the Patient came to the hospital is:  
Patient Active Problem List  
 Diagnosis Date Noted  CAP (community acquired pneumonia) 01/17/2020  Sepsis due to pneumonia ([facility] 01/17/2020  Hyponatremia 01/16/2020  A-fib ([facility] 01/16/2020  Pneumonia 01/16/2020  Dyslipidemia 08/07/2018  
 HTN (hypertension) 07/18/2018  Trigeminal neuralgia 07/18/2018  SLE (systemic lupus erythematosus) ([facility] 07/18/2018  Syncope 07/17/2018 The  provided the following Interventions: 
Initiated a relationship of care and support. Explored issues of ronak, belief, spirituality and Temple/ritual needs while hospitalized. Listened empathically. Provided chaplaincy education. Provided information about Spiritual Care Services. Offered prayer and assurance of continued prayers on patient's behalf. Chart reviewed. The following outcomes where achieved: 
Patient shared limited information about both their medical narrative and spiritual journey/beliefs.  confirmed Patient's Baptist Affiliation. Patient processed feeling about current hospitalization. Patient expressed gratitude for 's visit. Assessment: 
Patient does not have any Temple/cultural needs that will affect patient's preferences in health care. There are no spiritual or Temple issues which require intervention at this time. Plan: 
Chaplains will continue to follow and will provide pastoral care on an as needed/requested basis.  recommends bedside caregivers page  on duty if patient shows signs of acute spiritual or emotional distress.  Vijay Raines Spiritual Care  
[phone]

## 2020-01-21 NOTE — PROGRESS NOTES
New PT orders received with pt on caseload. Will follow up as appropriate. Acknowledged new orders. Orders are for daily PT/walk per family. Per PT plan pt will be seen 4-7 times per week. PT cannot come daily in the hospital but nursing can also ambulate with pt. Thank you, Tabatha Alvarez, PT, DPT

## 2020-01-22 NOTE — PROGRESS NOTES
[facility] - Progress Note Admit Date: 1/16/2020 Assessment:  
 
Hospital Problems  Date Reviewed: 6/6/2019 Codes Class Noted POA  
 CAP (community acquired pneumonia) ICD-10-CM: J18.9 ICD-9-CM: 118  1/17/2020 Unknown * (Principal) Sepsis due to pneumonia [facility]) ICD-10-CM: J18.9, A41.9 ICD-9-CM: 351, 995.91  1/17/2020 Unknown Hyponatremia ICD-10-CM: E87.1 ICD-9-CM: 276.1  1/16/2020 Unknown A-fib [facility]) ICD-10-CM: I48.91 
ICD-9-CM: 427.31  1/16/2020 Unknown Pneumonia ICD-10-CM: J18.9 ICD-9-CM: 558  1/16/2020 Unknown  
   
  
 
 
-S/p code blue arrest 1/21/2020. Like primary pulmonary process with aspiration resulting in bradycardic pulseless arrest. Patient is now intubated. -Sepsis likely secondary to Community Acquired Pneumonia along with possible UTI. -Atrial fibrillation with RVR. New diagnosis. Likely driven by underlying pulmonary process. Afib RVR with hypotension requiring emergent cardioversion 1/22/2020. 
-H/o syncope/bradycardia while on BB 7/2018. Patient underwent a pharmacologic nuclear stress test, echo and a 30-day event monitor at that time, all of which were normal.  
-Hypertension. Stable. -Hyperlipidemia. On statin. -SL with lupus nephritis. 
-Chronic HAJI. -Chronic pain, fibromyalgia. 
-Anxiety, depression, current delirium? 
-Normal EF 60-65% by echo this admission. 
-Patient is now DNR. 
  
Primary cardiologist Dr. Sb Matos. 
  
 
Plan:  
 
Patient is s/p code blue arrest 1/21/2020. Like primary pulmonary process with aspiration resulting in bradycardic pulseless arrest. Patient is now intubated and hypotensive on pressor support. Noted unstable afib with RVR with hypotension this AM requiring emergent cardioversion. Patient is now sinus on amio drip, will continue. Would continue supportive care. Patient was anticoagulated with lovenox (which is currently on hold) while discussions regarding candidacy for long term for [address] [place] [address]. Subjective: Intubated s/p arrest overnight. Objective:  
  
Patient Vitals for the past 8 hrs: 
 Temp Pulse Resp BP SpO2  
01/22/20 0815  89 20  97 % 01/22/20 0800 99.7 °F (37.6 °C)      
01/22/20 0715  96 20 133/62 99 % 01/22/20 0700  92 20 115/57 98 % 01/22/20 0645  92 20 129/55 99 % 01/22/20 0630  88 20 (!) 134/113 100 % 01/22/20 0615  93 20 114/66 100 % 01/22/20 0600  88 20 135/60 100 % 01/22/20 0545  87 20 134/58 100 % 01/22/20 0530  85 20 119/65 100 % 01/22/20 0515  89 20 132/56 100 % 01/22/20 0500  89 20 128/68 100 % 01/22/20 0445  89 20 126/55 100 % 01/22/20 0430  93 20 119/65 100 % 01/22/20 0415  92 20 107/51 100 % 01/22/20 0413  92 20  100 % 01/22/20 0400  95 20 107/60 100 % Patient Vitals for the past 96 hrs: 
 Weight  
01/21/20 0548 68.5 kg (151 lb 0.2 oz) 01/20/20 0459 69 kg (152 lb 1.6 oz) 01/19/20 0402 68 kg (150 lb) Intake/Output Summary (Last 24 hours) at 1/22/2020 1147 Last data filed at 1/22/2020 0700 Gross per 24 hour Intake 632.9 ml Output 401 ml Net 231.9 ml  
 
 
Physical Exam: 
General:  intubated Neck:  no JVD Lungs:  clear to auscultation bilaterally Heart:  regular rate and rhythm Abdomen:  abdomen is soft Extremities:  no edema Data Review:  
 
Labs: Results:  
   
Chemistry Recent Labs  
  01/22/20 
0430 01/21/20 2053 01/21/20 
9721 * 350* 119*  139 139  
K 3.7 4.0 4.0  
 98* 105 CO2 33* 34* 29 BUN 25* 18 14 CREA 1.27 1.09 0.46* CA 8.1* 8.3* 8.9 MG 1.8 2.3 2.0 PHOS  --  6.0*  --   
AGAP 6 7 5 BUCR 20 17 30* CBC w/Diff Recent Labs  
  01/22/20 
0430 01/21/20 2053 01/21/20 
0053 WBC 48.8* 33.0* 15.0*  
RBC 3.01* 3.17* 3.25* HGB 9.7* 10.4* 10.5* HCT 29.6* 31.9* 31.7* * 448* 420 GRANS 90* 73 84* LYMPH 3* 7* 6*  
EOS 0 0 0 Cardiac Enzymes No results found for: CPK, CK, CKMMB, CKMB, RCK3, CKMBT, CKNDX, CKND1, MAYITO, TROPT, TROIQ, CARRIE, TROPT, TNIPOC, BNP, BNPP Coagulation No results for input(s): PTP, INR, APTT, INREXT in the last 72 hours. Lipid Panel No results found for: CHOL, CHOLPOCT, CHOLX, CHLST, CHOLV, 457488, HDL, HDLP, LDL, LDLC, DLDLP, 157458, VLDLC, VLDL, TGLX, TRIGL, TRIGP, TGLPOCT, CHHD, CHHDX  
BNP No results found for: BNP, BNPP, XBNPT Liver Enzymes No results for input(s): TP, ALB, TBIL, AP, SGOT, GPT in the last 72 hours. No lab exists for component: DBIL Digoxin Thyroid Studies Lab Results Component Value Date/Time TSH 2.63 01/17/2020 05:30 AM  
    
 
Signed By: PETE Freire   
 January 22, 2020

## 2020-01-22 NOTE — PROCEDURES
[address] [facility] Pulmonary Specialist  [facility] Procedure Note -- Failed/aborted Indication: Inadequate venous access Line attempted post cardiac arrest due to no IV access Katherene Means Central line Bundle: 
Due to emergent nature s/p code blue without IV access, barrier precautions were not performed Without using ultrasound guidance, Right femoral attempted cannulation x 4 attempt(s) utilizing the modified Seldinger technique. Marked difficulty - unable to place line. IO retrieved and placed, so line aborted Elie Hanna MD/MPH Pulmonary, Critical Care Medicine [address] [place] [address] Pulmonary Specialists

## 2020-01-22 NOTE — ROUTINE PROCESS
Received code pt from [facility] at [address] on vent. Pt coded again in ICU at [address] Levo, Epi and Vaso started for hypotension. Fentanyl and Versed drip started for sedation. Amiodarone drip with bolus started for Uncontrolled A/Fib with RVR. Pt heart rate in the 190's. Cardioversion x 1 at 100J was don't at 0300. Pt converted to NSR with a rate of 100. Left femoral CVL and left radial A/Line inserted by [facility] [place] NP. Pt resting on vent with stable vital signs.

## 2020-01-22 NOTE — PROGRESS NOTES
attended the interdisciplinary rounds for Sydni Estevez, who is a 80 y.o.,female. Patients Primary Language is: [place]. According to the patients EMR Uatsdin Affiliation is: No Mosque. The reason the Patient came to the hospital is:  
Patient Active Problem List  
 Diagnosis Date Noted  CAP (community acquired pneumonia) 01/17/2020  Sepsis due to pneumonia ([facility] 01/17/2020  Hyponatremia 01/16/2020  [facility] ([facility] 01/16/2020  Pneumonia 01/16/2020  Dyslipidemia 08/07/2018  
 HTN (hypertension) 07/18/2018  Trigeminal neuralgia 07/18/2018  SLE (systemic lupus erythematosus) ([facility] 07/18/2018  Syncope 07/17/2018 Not able to assess the patient due to medical condition. No family at bedside. According to the medical chart, patient is an DNR. Plan: 
Chaplains will continue to follow and will provide pastoral care on an as needed/requested basis.  recommends bedside caregivers page  on duty if patient shows signs of acute spiritual or emotional distress. [address] [facility] Board Certified [address] [place] [address] Spiritual Care  
[phone]

## 2020-01-22 NOTE — PROGRESS NOTES
[address] [facility] Progress Note Patient: Sebas Peña MRN: [id number] SSN: [id number]  YOB: 1933 Age: 80 y.o. Sex: female Admit Date: 1/16/2020 LOS: 6 days Chief Complaint Patient presents with  Shortness of Breath  Irregular Heart Beat RVR Subjective:  
 
Pt has cardiac arrest last evening 1848, ROSC achieved at [address] Code status updated by PFM after talking to Son (full code now). Pt had second cardiac arrest at [address] 1772 at [address] [facility]. Pt was intubated and transferred to ICU. Pt arrested a 3rd time in ICU 1948 ROSC at [address]. Pt went into Afib RVR w/ rates up to 190s, lead to decomp. Needed amiodarone bolus and drip. Cardioversion x1. Converted to NSR. This AM pt intubated and sedated, on pressors in ICU. ROS - not done to due to Pt conditions. Objective:  
 
Visit Vitals /51 Pulse 74 Temp 97.2 °F (36.2 °C) Resp 20 Ht 4' 10\" (1.473 m) Wt 68.5 kg (151 lb) SpO2 97% Breastfeeding No  
BMI 31.56 kg/m² Physical Exam:  
General appearance: intubated and sedated Lungs: diffuse crackles throughout Heart: RRR Abdomen: soft, non-tender. Bowel sounds+ Pulses: 2+ and symmetric Intake and Output: 
Current Shift: 01/22 0701 - 01/22 1900 In: 180 Out: 100 [Urine:100] Last three shifts: 01/20 1901 - 01/22 0700 In: 1292.9 [P.O.:920; I.V.:372.9] Out: 901 [Urine:900] Lab/Data Review: 
Recent Results (from the past 12 hour(s)) POC G3 Collection Time: 01/22/20  6:17 AM  
Result Value Ref Range Device: VENT    
 FIO2 (POC) 80 % pH (POC) 7.493 (H) 7.35 - 7.45    
 pCO2 (POC) 41.7 35.0 - 45.0 MMHG  
 pO2 (POC) 194 (H) 80 - 100 MMHG  
 HCO3 (POC) 32.0 (H) 22 - 26 MMOL/L  
 sO2 (POC) 100 (H) 92 - 97 % Base excess (POC) 9 mmol/L Mode ASSIST CONTROL Tidal volume 300 ml Set Rate 20 bpm  
 PEEP/CPAP (POC) 12 cmH2O  
 PIP (POC) 26 Allens test (POC) N/A Inspiratory Time 0.9 sec Total resp. rate 17 Site DRAWN FROM ARTERIAL LINE Specimen type (POC) ARTERIAL Performed by Art Francisco Volume control plus YES    
GLUCOSE, POC Collection Time: 01/22/20 11:36 AM  
Result Value Ref Range Glucose (POC) 130 (H) 70 - 110 mg/dL ECHO ADULT FOLLOW-UP OR LIMITED Collection Time: 01/22/20  1:00 PM  
Result Value Ref Range Ao Root D 3.21 cm LVIDd 3.78 (A) 3.9 - 5.3 cm  
 LVPWd 1.19 (A) 0.6 - 0.9 cm LVIDs 2.57 cm IVSd 1.17 (A) 0.6 - 0.9 cm  
 LV ES Vol A4C 64.2 mL  
 LVOT d 1.94 cm  
 LV Ejection Fraction MOD 4C 27 % LV Mass .7 (A) 67 - 162 g  
 LV Mass AL Index 105.6 (A) 43 - 95 g/m2 LV ED Vol A4C 87.9 mL Triscuspid Valve Regurgitation Peak Gradient 30.6 mmHg  
 TR Max Velocity 276.50 cm/s PASP 34.0 mmHg LVED Vol Index A4C 54.4 mL/m2 LVES Vol Index A4C 39.7 mL/m2 Left Ventricular Fractional Shortening by 2D 58.384813009 % Left Ventricular End Diastolic Volume by Teichholz Method 1.12855289934 mL Left Ventricular End Systolic Volume by Teichholz Method 4.0371295914 mL Left Ventricular Stroke Volume by Teichholz Method 68.752395760 mL Left Ventricular Stroke Volume by 2-D Single Plane- MOD 66.790301052 mL  
GLUCOSE, POC Collection Time: 01/22/20  4:36 PM  
Result Value Ref Range Glucose (POC) 175 (H) 70 - 110 mg/dL Assessment and Plan:  
 
80 y.o. female with PMH significant for dementia, HLD, HTN, SLE now admitted with CAP and new onset Afib w/ RVR s/p cardiac arrest x3. Now in ICU. S/p Cardiac Arrest  
Pt admitted for sepsis 2/2 CAP. Cardiac arrest last eveningx3. Intubated on pressors in ICU. Pt code status changed to DNR per family. Increasing leukocytosis 33, LA 6.3. CXR (1/21) showing multifocal airspace disease which could represent either pulmonary edema or multifocal pneumonia. Etiology of arrest might have been resp arrest 2/2 to aspiration. Now on zosyn and Vanc. TF to begin today. A-fib w/RVR - afib w RVR on admission. New diagnosis for PT. On dilt drip then transitioned to PO and metoprolol. Last evening 
rates up to 190 (1/22) leading to decomp. Amiodarone bolus then drip started. ECHO on admission of (1/17) EF of 60-65%, now with severe systolic dysfunction EF 97-00%. H/o HTN now hypotensive- on amlodipine and losartan at home. Now on pressors.  
  
ICU primary. We appreciate your care of our pt. We will continue to follow. Shaniqua Delgado MD PGY-1 
[address]

## 2020-01-22 NOTE — PROGRESS NOTES
Pt unable to participate in OT due to pt being transferred to ICU following cardiac event. Will need new OT orders when pt is stable and appropriate for skilled therapy. Discontinuing current OT orders. Thank you, RACHAEL Carpenter/BRIELLE

## 2020-01-22 NOTE — PROGRESS NOTES
MEWS score a 3 due to HR of 120. Pt in and out of uncontrolled afib. No change in status. Will cont to monitor for any new  Changes. MD's aware and pt being treated.

## 2020-01-22 NOTE — PROGRESS NOTES
NUTRITION Nutrition Screen RECOMMENDATIONS / PLAN:  
 
- Discontinue diet 
- Start tube feeding of Vital High Protein at 10 mL/hr and advance as tolerated by 10 mL q 4 hours to goal rate of 40 mL/hr with daily multivitamin and 50 mL q 4 hour water flushes.  
- Continue RD inpatient monitoring and evaluation. Goal Regimen: Vital High Protein at 40 mL/hr + 50 mL q 4 hour water flushes to provide: 960 kcal, 84 gm protein, 108 gm CHO, 0 gm fiber, 803 mL free water, 1103 mL total water, 68% RDIs NUTRITION INTERVENTIONS & DIAGNOSIS:  
 
- Enteral nutrition: initiate, verbal order from MD 
- Vitamin and mineral supplement therapy: add MVI  
- Collaboration and referral of nutrition care: interdisciplinary rounds Nutrition Diagnosis: Inadequate oral intake related to respiratory status as evidenced by pt NPO. ASSESSMENT:  
 
S/p cardiac arrest and intubated. Nutritional intake adequate to meet patients estimated nutritional needs:  No 
 
Diet: DIET CARDIAC Regular; High Fiber Food Allergies: NKFA Current Appetite: Not Applicable - NPO Appetite/meal intake prior to admission: Unable to determine at this time Feeding Limitations:  [] Swallowing difficulty    [] Chewing difficulty    [x] Other: respiratory status Current Meal Intake:  
Patient Vitals for the past 100 hrs: 
 % Diet Eaten 01/21/20 1258 15 % 01/21/20 0944 25 % 01/20/20 1343 10 % 01/20/20 0700 25 % 01/19/20 1422 50 % 01/19/20 1031 25 % 01/18/20 1838 50 % 01/18/20 0900 25 % 01/18/20 0631 60 % BM: 1/20 Skin Integrity: WDL Edema:   [x] No     [] Yes Pertinent Medications: Reviewed: ascorbic acid, atorvastatin, cholecalciferol, levophed, omega 3 DHA, EPA fish oil, pantoprazole, vasopressin, vitamin E Recent Labs  
  01/22/20 
0430 01/21/20 
2053 01/21/20 
0053  139 139  
K 3.7 4.0 4.0  
 98* 105 CO2 33* 34* 29  
* 350* 119* BUN 25* 18 14 CREA 1.27 1.09 0.46* CA 8.1* 8.3* 8.9 MG 1.8 2.3 2.0 PHOS  --  6.0*  -- Intake/Output Summary (Last 24 hours) at 1/22/2020 1026 Last data filed at 1/22/2020 0700 Gross per 24 hour Intake 632.9 ml Output 401 ml Net 231.9 ml Anthropometrics: 
Ht Readings from Last 1 Encounters:  
01/17/20 4' 10\" (1.473 m) Last 3 Recorded Weights in this Encounter 01/19/20 6869 01/20/20 2296 01/21/20 0931 Weight: 68 kg (150 lb) 69 kg (152 lb 1.6 oz) 68.5 kg (151 lb 0.2 oz) Body mass index is 31.56 kg/m². Obese, Class I Weight History:  
Weight Metrics 1/21/2020 6/6/2019 10/9/2018 8/14/2018 8/7/2018 7/18/2018 6/20/2018 Weight 151 lb 0.2 oz 157 lb 161 lb 159 lb 159 lb 152 lb 152 lb BMI 31.56 kg/m2 32.81 kg/m2 33.65 kg/m2 33.23 kg/m2 33.23 kg/m2 31.77 kg/m2 31.77 kg/m2 Admitting Diagnosis: A-fib ([facility] [I48.91] Pneumonia [J18.9] Hyponatremia [E87.1] CAP (community acquired pneumonia) [J18.9] Pertinent PMHx: dementia, HTN, HLD Education Needs:        [x] None identified  [] Identified - Not appropriate at this time  []  Identified and addressed - refer to education log Learning Limitations:   [] None identified  [x] Identified: altered mentation Cultural, Cheondoism & ethnic food preferences:  [x] None identified    [] Identified and addressed ESTIMATED NUTRITION NEEDS:  
 
Calories: 759-966 kcal (11-14 kcal/kg) based on  [x] Actual BW 68 kg     [] IBW Protein:  gm (2-2.5 gm/kg) based on  [] Actual BW      [x] IBW 41 kg Fluid: 1 mL/kcal 
  
MONITORING & EVALUATION:  
 
Nutrition Goal(s):  
- Enteral nutrition intake will meet >75% of patient estimated nutritional needs within the next 7 days.    Outcome: New/Initial goal  
 
Monitoring:   [x] Food and nutrient intake   [x] Food and nutrient administration  [x] Comparative standards   [x] Nutrition-focused physical findings   [x] Anthropometric Measurements   [x] Treatment/therapy   [x] Biochemical data, medical tests, and procedures Previous Recommendations (for follow-up assessments only):  Not Applicable Discharge Planning: Nutritional discharge needs unknown at this time. Participated in care planning, discharge planning, & interdisciplinary rounds as appropriate. Christiana Dejesus RD, [facility] Pager: [phone]

## 2020-01-22 NOTE — PROGRESS NOTES
[address] [facility] Code Blue Note Patient: Shena Rosado MRN: [id number]  CSN: [id number] YOB: 1933  Age: 80 y.o. Sex: female Admit Date: 1/16/2020 Admitting Diagnosis: A-fib ([address] [I48.91] Pneumonia [J18.9] Hyponatremia [E87.1] CAP (community acquired pneumonia) [J18.9] CODE BLUE Code Satish at \[facility] [place] [facility]\"". PFM team arrived at approx 18:48. Code was run per ACLS guidelines. CPR started by nursing staff w/dose of epi administered at 18:48 followed by pulse check (-) and restarting chest compressions w/new provider. Dr. Mark Cui arrived at 18:50. Bicarb administered at 18:51. 
18:52, a second dose of epi administered, pulse check was performed (+/ROSC): . 
18:54, family consent to intubate was received, and anesthesia was called. BP was 70/41. 18:56, /76 and . Prep for central line commenced. Anesthesia arrived at 18:58. 
19:00, 152/88 w/HR 89. 
19:08, intubation complete: [email or web address]. 133/75 
19:11, BP 96/55  
19:12, accucheck 249 
19:13, IO placed 19:14, epi via IO 
19:14, decompensated and compressions restarted 19:16, (+/ROSC) pulse; compressions ceased 19:17, bicarb administered 19:20, etomidate administered, 138/79 (+) pulse. Transfer to ICU OBJECTIVE Patient Vitals for the past 24 hrs: 
 Temp Pulse Resp BP SpO2  
01/21/20 2118  (!) 166 16  94 % 01/21/20 1540 98.8 °F (37.1 °C) (!) 106 21 177/85 99 % 01/21/20 1116 98.5 °F (36.9 °C) (!) 116 19 156/82 97 % 01/21/20 0747 98.7 °F (37.1 °C) (!) 120 20 134/78 96 % 01/21/20 0405 98.3 °F (36.8 °C) 97 19 146/63 95 % 01/21/20 0147     98 % 01/20/20 2324 98.2 °F (36.8 °C) (!) 109 20 149/90 98 % 01/20/20 2140  (!) 119  160/76  PHYSICAL: (+) femoral and radial pulse w/doppler confirmation. Intubation tube placement confirmed via capnography ASSESSMENT, PLAN & DISPOSITION Shena Rosado is a 80y.o. year old female admitted for sepsis 2/2 CAP. Code Blue called for bradycardia, pulselessness. Disposition: transfer to ICU Attending, Dr. Lane Hinojosa, notified of code blue. In agreement with plan. ICU team resuming care. Michelle Pleitez MD, PGY-1  
[facility] Medicine Intern Pager: [phone] January 21, 2020, 9:26 PM

## 2020-01-22 NOTE — PROGRESS NOTES
Received report on pt.from off going RN. Resting quietly in bed on rounds. Denies c/o pain or SOB at this time. Still c/o coughing frequently but that \" its better than yesterday. \". No acute distress noted. Daughter at bedside. Call bell at side. Bed alarm on. Oriented to person and place. On and off to situation. Will cont to monitor for any changes in status at this time. 1100 assisted into recliner chair by PT. Daughter remains at bedside. Call bell in reach. [phone] pt wanted to go back to bed and not sit up to eat lunch. Assisted back to bed. Able to bear weight and get back to bed with assist of one. Call bell at side. Bed alarm on. Daughter at side. 1300 appetite still poor. Sitting up in bed to eat but only took a few bites. When awake still having a frequent cough. 36 daughter at bedside. Pt sleeping when aroused did not want to wake up and eat. HOB elevated. Bed alarms on.  
 
1800 daughter leaving to go home. Pt calm and cooperative. Bed alarms on. Will monitor closely for any signs of increased confusion or agitation. 1830 remains asleep without any distress noted. Aroused easily when called to. Reminded that daughter was not in room and to push red button for nurse. and not to get OOB . Occasional coughing still noted. HOB elevated 35 degrees. Bed alarm on call bell at side. 02 on. 1844 sitting at desk in front of monitors when [employer] tech called and said that pts HR had dropped to 50's immediately saw HR drop further to 35. Went immediately to pts room and noted pt to be unresponsive with agonal respirations and no palpalpable pulse. CPR immediately started and CODE blue called. 1845 Respirations given per AMBU bag.  
 
1846 Code team arrived and CODE events recorded by Monie Tinsley, Nurse manager and myself. Code events will be entered in Notes by Dr Kb TAVERA.   
 
1920 pt transferred to ICU by code team. Family on the way to the hospital.  
 
 Jakob Sharpe all belongings including glassed and cell phone given to family.

## 2020-01-22 NOTE — PROGRESS NOTES
PCCM progress note: CODE BLUE Note I was leader of this code blue event. Pt on 2S under the PFM service had cardiac arrest.  I came to bedside and CPR had been initiated. Pt noted to be a \"Partial Code\" -- ok for compressions but DNI. We performed CPR per ACLS guidelines Code start time: 18 Initial rhythm: PEA Code end time 1856 Result:  ROSC achieved. Initial BP post arrest was elevated, likely due to epinephrine administration. Post arrest, stabilization see PCCM Consult note. Pt aspirated post code, pt not intubated per her wishes. PFM then contacted and updated healthcare surrogate who indicated they were ok with intubation, so anesthesia called stat. Emilee Eid MD/MPH Pulmonary, Critical Care Medicine [facility]

## 2020-01-22 NOTE — PROGRESS NOTES
[facility] Pulmonary Specialist   
[facility] [place] Procedure Note With [employer] Indication: Need for vasopressors Line place emergently post cardiac arrest. Consent not received. Patient positioned in Trendelenburg. Central line Bundle: 
Full sterile barrier precautions used. 7-Step Sterility Protocol followed. (cap, mask sterile gown, sterile gloves, large sterile sheet, hand hygiene, 2% chlorhexidine for cutaneous antisepsis) Using ultrasound guidance, Left femoral cannulated x 2 attempt(s) utilizing the modified Seldinger technique. Moderate difficulty. Unable to thread wire on first attempt. On second attempt, bright red pulsatile blood from needle, femoral artery was cannulated. Needle was removed direct pressure was held at groin for 3-4 minutes. Hemostasis was achieved. Groin soft with no hematoma. 2nd provider attempt at CVL placement: 
Using ultrasound guidance, Left femoral cannulated x 1 attempt(s) utilizing the modified Seldinger technique. No difficulty. Position of wire confirmed in vein using US before dilating. Wire was removed. Good non pulsatile dark blood return. Catheter secured & Biopatch applied. Sterile Tegaderm placed. First attempt by [facility] Pulmonary, Critical Care Medicine [facility] I was present for and supervised the entire procedure. See NP note for details. Emilee Eid MD/MPH Pulmonary, Critical Care Medicine [facility]

## 2020-01-22 NOTE — PROGRESS NOTES
MEWS score a 3. Pt had been in and out of uncontrolled afib with a tachy HR. That increases with activity. No changes in pts status. MD's aware. Will cont to monitor for any new changes.

## 2020-01-22 NOTE — ROUTINE PROCESS
Bedside and Verbal shift change report given to Gisella Morocho (oncoming nurse) by Rogelio Reyes RN 
 (offgoing nurse). Report given with SBAR, Kardex, Intake/Output, MAR, Accordion and Recent Results.

## 2020-01-22 NOTE — PROGRESS NOTES
PCCM progress note: CODE BLUE Note I was leader of this code blue event. CPR per ACLS guidelines Code start time: 1911 Initial rhythm: asystole Code end time 1914 Result:  ROSC achieved. BP post arrest was elevated, likely due to epinephrine administration. Please see nusring flow sheets for additional details. For post arrest stabilization, please refer to PCCM consult note. Aretha Leyden MD/MPH Pulmonary, Critical Care Medicine [facility]

## 2020-01-22 NOTE — PROGRESS NOTES
MEWS 3 due to elevated HR. Pt has been with a tachy rate in uncontrolled afib . MD's aware. no changes in status. Will cont to monitor for any changes.

## 2020-01-22 NOTE — PROCEDURES
[facility] Pulmonary Specialists Pulmonary, Critical Care, and Sleep Medicine Name: Jerardo Figueroa MRN: [id number] : 1933 Hospital: [facility] Date: 2020 Therapeutic Bronchoscopy Report Procedure: Therapeutic bronchoscopy. Indication: Profound aspiration during cardiac arrest 
 
Consent/Treatment: Informed consent was not obtained due to emergent nature of procedure. Procedure performed as emergency Anesthesia:  
Patient on ventilator and receiving  Versed 2mg Procedure Details:  
-- The bronchoscope was introduced through an endotracheal tube. -- The vocal cords were found to be normal. 
-- The trachea and rich were completely inspected and were found to be normal. 
-- The right-sided endobronchial anatomy was completely inspected and was found to be structurally normal. 
-- The left-sided endobronchial anatomy was completely inspected and was found to be structurally normal.  
-- The left mainstem bronchus and distal LLL had very thick purulent secretions which were aspirated as completely as possible. Due to thick nature of secretions, aliquots of saline were instilled into the airway and aspirated as completely as possible. -- The scope was then placed back the right lung and found thick purulent secretions in bronchus intermedius and RLL, which were aspirated as completely as possible. Due to thick nature of secretions, aliquots of saline were instilled into the airway and aspirated as completely as possible. -- Bronchoscope was then wedged in the RLL and BAL was performed and sent for gram stain and culture Specimens: The bronchoscope was wedged in the RLL and bronchoalveolar lavage was performed; material was sent for  microbiology Complications: none Estimated Blood Loss: Minimal 
 
 
Mindy Osborne MD/MPH Pulmonary, Critical Care Medicine [facility]

## 2020-01-22 NOTE — PROGRESS NOTES
01/22/20 1711 Weaning Parameters Spontaneous Breathing Trial Complete No (Comments) (does not meet criteria )

## 2020-01-22 NOTE — PROGRESS NOTES
PT orders received, chart reviewed. Pt unable to participate with PT due to pt transferred to ICU. Will need new orders when pt is stable to participate in therapy. Discontinuing current PT orders.   
 
Thank you for this referral. 
Olive Avina, PT, DPT

## 2020-01-22 NOTE — CONSULTS
[employer] [facility] Pulmonary, Critical Care, and Sleep Medicine Name: Shena Rosado MRN: [id number] : 1933 Hospital: [facility] Date: 2020 Critical Care Consult Note IMPRESSION:  
· Cardiac arrest - Code blue on the floor, most likely due to respiratory failure or possible aspiration. · Acute respiratory failure requiring intubation - now with aspiration on top of severe Pneumonia. · Severe sepsis with shock - Pneumonia is presumed source. On day 5 out of 7 of Azithro and Rocephin. Blood cx on 20 no growth. · Atrial fibrillation with RVR - new dx since hospitalization. Antioagulated on lovenox. · Lactic acidosis · Hyperglycemia - No mention of DM in chart, Hyperglycemic today. · HTN  
· HLD · Hyponatremia upon initial ED presentation. Since resolved. · Dementia · Trigeminal Neuralgia - on Carbamezapine · UTI no initial cultures sent - unlikely source of sepsis from initial UA. Patient Active Problem List  
Diagnosis Code  Syncope R55  
 HTN (hypertension) I10  
 Trigeminal neuralgia G50.0  SLE (systemic lupus erythematosus) ([facility]) M32.9  Dyslipidemia E78.5  Hyponatremia E87.1  A-fib ([facility]) I48.91  
 Pneumonia J18.9  
 CAP (community acquired pneumonia) J18.9  Sepsis due to pneumonia ([facility]) J18.9, A41.9 RECOMMENDATIONS:  
· Resp: Titrate FiO2/ supp O2 for SpO2 >90%; Intubated, maintaining lower Tidal Volumes at this time for concern for ARDS. Monitor Peak pressures. Routine Abgs · I/D: Septic with Leukocytosis, WBCs have doubled to 33 today; Sepsis bundle per hospital protocol, f/u BxCx, Sputum Cx's. LA ordered- initial and repeat q4hrs until normal. ABX :Broadening coverage with Zosyn and Vanc. Deescalate ABX once Cx's finalize. · Hem/Onc: Daily CBC; H/H, and plts are stable · CVS: s/p cardiac arrest in afib with RVR; continuous arterial pressure monitoring, Actively titrate vasopressors: NE, Vasopressin, Epi (attempt wean epi first) aim MAP >65mmHg, Check cardiac panel, repeat ECHO order pending. 100mg hydrocortisone given. · Metabolic: Daily BMP; monitor e-lytes; replace PRN. Placed on SSI · Renal: Trend Renal indices; not currently requiring diuresis, Harrell · Endocrine: POC Glucose q6; TSH stable. · GI: SUP Protonix daily started, Trend LFTs, NGT to light suction. · Musc/Skin: No acute issues, wound care. · OB/GYN: no issues. · Neuro: Fentanyl  and versed gtt for sedation. Continue Carbamezapine for Trigeminal neuralgia. When vitals more stable consider CT head. · Code Status: DNR - See hospitalist note - After ROSC was achieved during code blue, Family wanted emergent intubation allowed. According to Dr. Cleo Maurer note on 1/21/20 she discussed with the family and they changed her code status from FULL to DNR. Best Practices/Safety Bundles: 
· Sepsis Bundle per Hospital Protocol · Glycemic control; avoid Hypoglycemia · IHI ICU Bundles: 
·  Central Line Bundle Followed , Harrell Bundle Followed and Vent Bundle Followed, Vent Day 1   
· [facility] Vent patients/ Pulmonary pts:  
· VAP bundle, Aim to keep peak plateau pressure [id number] H2O 
· Aspiration Precautions - HOB >30' · Daily sedation holiday as indicated · SBT as tolerated/appropriate · Stress ulcer prophylaxis: Protonix · DVT prophylaxis:Lovenox · Need for Lines, harrell assessed. · Restraints need. Subjective/History: This patient has been seen and evaluated at the request of Dr. Roberto Bonilla for respiratory failure and cardiac arrest.   
 
01/22/20 History was obtained by medical records. Patient is a 80 y.o. female with a history of baseline dementia, HTN, HLD, SLE, trigeminal neuralgia and was admitted on 1/16/20 in afib with RVR with CAP and hyponatremia. Prior to admission patient had a cough for past 2 weeks.   During hospitalization the patient was being treated for CAP with improving SIRS criteria, Bcx negative, started on rocephin and azithromycin in ED. Patient during hospital stay pt had been hemodynamically stable on antihypertensives, during the evening of 1/21/20pt started to require more O2 and was placed on NC, then around 18:48 pt went into cardiac arrest, code blue was called, and RRT achieved ROSC twice with 3 rounds of epi, final ROSC achieved at 1916. Unclear from treatment team as to why the patient had a cardiac arrest but presumably because she may have developed acute respiratory failure from possible aspiration. The patient is critically ill and can not provide additional history due to Unconsciousness and Ventilated. Past Medical History:  
Diagnosis Date  Dyslipidemia  H/O echocardiogram 07/2018 EF 65%, thickening of the mitral valve leaflets with annular calcification, no regurgitation or stenosis  Hypertension  Syncope 07/2018 No past surgical history on file. Prior to Admission medications Medication Sig Start Date End Date Taking? Authorizing Provider  
albuterol (PROVENTIL HFA, VENTOLIN HFA, PROAIR HFA) 90 mcg/actuation inhaler Take 2 Puffs by inhalation every six (6) hours as needed for Wheezing. Provider, Historical  
atorvastatin (LIPITOR) 10 mg tablet Take 10 mg by mouth daily. Provider, Historical  
multivit-min/iron/folic/lutein (CENTRUM SILVER WOMEN PO) Take  by mouth. Provider, Historical  
fish oil-omega-3 fatty acids 300-500 mg cap Take  by mouth. Provider, Historical  
vitamin E (AQUA GEMS) 400 unit capsule Take  by mouth daily. Provider, Historical  
L[place]bi [id number] mg-mg-million tab Take  by mouth. Provider, Historical  
ascorbic acid, vitamin C, (VITAMIN C) 500 mg tablet Take  by mouth. Provider, Historical  
cholecalciferol (VITAMIN D3) 2,000 unit cap capsule Take  by mouth two (2) times a day.     Provider, Historical  
 amLODIPine (NORVASC) 5 mg tablet TK 1 T PO QD 9/28/18   Provider, Historical  
aspirin (ASPIRIN) 325 mg tablet Take 975 mg by mouth three (3) times daily. Provider, Historical  
carBAMazepine ER (CARBATROL ER) 100 mg capsule Take 100 mg by mouth two (2) times a day. Provider, Historical  
LORazepam (ATIVAN) 0.5 mg tablet Take  by mouth every eight (8) hours as needed for Anxiety. Provider, Historical  
montelukast (SINGULAIR) 10 mg tablet Take 10 mg by mouth as needed. Provider, Historical  
losartan (COZAAR) 100 mg tablet Take 100 mg by mouth daily. Other, MD David  
 
 
Current Facility-Administered Medications Medication Dose Route Frequency  pantoprazole (PROTONIX) 40 mg in 0.9% sodium chloride 10 mL injection  40 mg IntraVENous DAILY  insulin lispro (HUMALOG) injection   SubCUTAneous Q6H  
 NOREPINephrine (LEVOPHED) 32 mg in 5% dextrose 250 mL infusion  0.5-30 mcg/min IntraVENous TITRATE  chlorhexidine (PERIDEX) 0.12 % mouthwash 10 mL  10 mL Oral Q12H  
 fentaNYL (PF) 900 mcg/30 ml infusion soln  0-200 mcg/hr IntraVENous TITRATE  midazolam in normal saline (VERSED) 2 mg/mL infusion  1-10 mg/hr IntraVENous TITRATE  EPINEPHrine (ADRENALIN) 8 mg in 0.9% sodium chloride 250 mL infusion  1-10 mcg/min IntraVENous TITRATE  vasopressin (VASOSTRICT) 20 Units in 0.9% sodium chloride 100 mL infusion  0-0.03 Units/min IntraVENous TITRATE  hydrocortisone Sod Succ (PF) (SOLU-CORTEF) injection 100 mg  100 mg IntraVENous Q8H  
 NOREPINephrine (LEVOPHED) 8 mg in 5% dextrose 250mL (32 mcg/mL) infusion  0-16 mcg/min IntraVENous TITRATE  piperacillin-tazobactam (ZOSYN) 3.375 g in 0.9% sodium chloride (MBP/ADV) 100 mL MBP  3.375 g IntraVENous Q6H  
 [START ON 1/23/2020] vancomycin (VANCOCIN) 750 mg in  mL infusion  750 mg IntraVENous Q24H  
 [Held by provider] albuterol-ipratropium (DUO-NEB) 2.5 MG-0.5 MG/3 ML  3 mL Nebulization Q6H RT  
  vitamin E acetate capsule 400 Units  400 Units Oral DAILY  ascorbic acid (vitamin C) (VITAMIN C) tablet 500 mg  500 mg Oral DAILY  atorvastatin (LIPITOR) tablet 10 mg  10 mg Oral DAILY  carBAMazepine XR (TEGretol XR) tablet 100 mg  100 mg Oral Q12H  cholecalciferol (VITAMIN D3) (400 Units /10 mcg) tablet 2.5 Tab  1,000 Units Oral DAILY  omega 3-DHA-EPA-fish oil 1,000 mg (120 mg-180 mg) capsule 1 Cap  1 Cap Oral DAILY  multivitamin, tx-iron-ca-min (THERA-M w/ IRON) tablet 1 Tab  1 Tab Oral DAILY  enoxaparin (LOVENOX) injection 60 mg  60 mg SubCUTAneous Q12H  cefTRIAXone (ROCEPHIN) 2 g in sterile water (preservative free) 20 mL IV syringe  2 g IntraVENous Q24H Allergies Allergen Reactions  Ace Inhibitors Unknown (comments)  Chlorhexidine Other (comments) Hives and swelling on contact  Ciprofloxacin Unknown (comments)  Hibiclens [Chlorhexidine Gluconate] Rash  Lisinopril Cough  Lyrica [Pregabalin] Unknown (comments)  Macrobid [Nitrofurantoin Monohyd/M-Cryst] Unknown (comments)  Minoxidil Swelling  Neurontin [Gabapentin] Unknown (comments)  Nitrofurantoin Unknown (comments) Nerve pain  Other Medication Hives Most blood pressure meds  Sulfa (Sulfonamide Antibiotics) Unknown (comments)  Tenex [Guanfacine] Unknown (comments)  Zocor [Simvastatin] Unknown (comments) Social History Tobacco Use  Smoking status: Never Smoker  Smokeless tobacco: Never Used Substance Use Topics  Alcohol use: No  
  
 
No family history on file. Review of Systems: 
Review of systems not obtained due to patient factors. Objective:  
Vital Signs:   
Visit Vitals /69 Pulse 96 Temp 98.8 °F (37.1 °C) Resp 20 Ht 4' 10\" (1.473 m) Wt 68.5 kg (151 lb 0.2 oz) SpO2 100% Breastfeeding No  
BMI 31.56 kg/m² O2 Device: Nasal cannula O2 Flow Rate (L/min): 4 l/min Temp (24hrs), Av.7 °F (37.1 °C), Min:98.5 °F (36.9 °C), Max:98.8 °F (37.1 °C) Intake/Output:  
Last shift:      No intake/output data recorded. Last 3 shifts:  0701 -  1900 In: 1160 [P.O.:1160] Out: 701 [Urine:700] Intake/Output Summary (Last 24 hours) at 2020 0411 Last data filed at 2020 1704 Gross per 24 hour Intake 800 ml Output 701 ml Net 99 ml Ventilator Settings: 
Mode Rate Tidal Volume Pressure FiO2 PEEP Assist control, VC+   300 ml    80 %(per md) 12 cm H20 Peak airway pressure: 33 cm H2O Minute ventilation: 8 l/min ARDS network Guidelines:  
Lung protective strategy and Plateau  Pressure goal < 30 cm H2O goals Oxygenation Goals PaO2 55-80 mm Hg or SaO2 88-95% PH goal 7.30-7.45 VAP bundle: 
Reviewed. Heyburn tube to suction at 20-30 cm Hg. Maintain Heyburn tube with 5-10ml air every 4 hours Routine oral care every 4 hours Elevation of head > 45 degree Daily sedation holiday and SBT evaluation starting at 6.00am. 
 
Physical Exam:  
 
General:  Intubated/Sedated Head:  Normocephalic, small abrasion to lower lip. Eyes:  Conjunctivae/corneas clear. Pupils are sluggish and equal.  
Nose: Nares normal. Septum midline. Mucosa normal. No drainage or sinus tenderness. Neck: Supple, symmetrical, trachea midline, no adenopathy, no carotid bruit and no JVD. Lungs:   Symmetrical chest rise; good AE bilat; Rhonchi noted bilaterally. Heart:  Irregularly irregular rhythm, , no m/r/g Abdomen:   Soft, non-tender. Bowel sounds normal. No masses. Extremities: Extremities normal, atraumatic, no cyanosis or edema. Pulses: 2+ and symmetric all extremities. Skin: Skin color, texture, turgor normal. No rashes or lesions Neurologic: Grossly nonfocal  
Devices:  Left radial Art line, ETT, Left Fem CVL, NGT Data:  
 
Recent Results (from the past 24 hour(s)) GLUCOSE, POC Collection Time: 20  7:13 PM  
Result Value Ref Range Glucose (POC) 249 (H) 70 - 110 mg/dL CULTURE, RESPIRATORY/SPUTUM/BRONCH W GRAM STAIN Collection Time: 01/21/20  8:53 PM  
Result Value Ref Range Special Requests: NO SPECIAL REQUESTS    
 GRAM STAIN MANY WBC'S    
 GRAM STAIN NO ORGANISMS SEEN Culture result: PENDING   
CBC WITH AUTOMATED DIFF Collection Time: 01/21/20  8:53 PM  
Result Value Ref Range WBC 33.0 (H) 4.6 - 13.2 K/uL  
 RBC 3.17 (L) 4.20 - 5.30 M/uL  
 HGB 10.4 (L) 12.0 - 16.0 g/dL HCT 31.9 (L) 35.0 - 45.0 % .6 (H) 74.0 - 97.0 FL  
 MCH 32.8 24.0 - 34.0 PG  
 MCHC 32.6 31.0 - 37.0 g/dL  
 RDW 12.4 11.6 - 14.5 % PLATELET 265 (H) 509 - 420 K/uL MPV 9.6 9.2 - 11.8 FL  
 NEUTROPHILS PENDING % LYMPHOCYTES PENDING % MONOCYTES PENDING % EOSINOPHILS PENDING % BASOPHILS PENDING %  
 ABS. NEUTROPHILS PENDING K/UL  
 ABS. LYMPHOCYTES PENDING K/UL  
 ABS. MONOCYTES PENDING K/UL  
 ABS. EOSINOPHILS PENDING K/UL  
 ABS. BASOPHILS PENDING K/UL  
 DF PENDING   
METABOLIC PANEL, BASIC Collection Time: 01/21/20  8:53 PM  
Result Value Ref Range Sodium 139 136 - 145 mmol/L Potassium 4.0 3.5 - 5.5 mmol/L Chloride 98 (L) 100 - 111 mmol/L  
 CO2 34 (H) 21 - 32 mmol/L Anion gap 7 3.0 - 18 mmol/L Glucose 350 (H) 74 - 99 mg/dL BUN 18 7.0 - 18 MG/DL Creatinine 1.09 0.6 - 1.3 MG/DL  
 BUN/Creatinine ratio 17 12 - 20 GFR est AA 58 (L) >60 ml/min/1.73m2 GFR est non-AA 48 (L) >60 ml/min/1.73m2 Calcium 8.3 (L) 8.5 - 10.1 MG/DL MAGNESIUM Collection Time: 01/21/20  8:53 PM  
Result Value Ref Range Magnesium 2.3 1.6 - 2.6 mg/dL PHOSPHORUS Collection Time: 01/21/20  8:53 PM  
Result Value Ref Range Phosphorus 6.0 (H) 2.5 - 4.9 MG/DL  
CALCIUM, IONIZED Collection Time: 01/21/20  8:53 PM  
Result Value Ref Range Ionized Calcium 1.08 (L) 1.12 - 1.32 MMOL/L  
TYPE & SCREEN Collection Time: 01/21/20  8:53 PM  
Result Value Ref Range Crossmatch Expiration 01/24/2020 ABO/Rh(D) A POSITIVE Antibody screen NEG   
LACTIC ACID Collection Time: 01/21/20  9:00 PM  
Result Value Ref Range Lactic acid 6.3 (HH) 0.4 - 2.0 MMOL/L  
AMMONIA Collection Time: 01/21/20  9:00 PM  
Result Value Ref Range Ammonia 22 11 - 32 UMOL/L  
POC G3 Collection Time: 01/21/20  9:45 PM  
Result Value Ref Range Device: VENT    
 FIO2 (POC) 100 % pH (POC) 7.345 (L) 7.35 - 7.45    
 pCO2 (POC) 62.8 (H) 35.0 - 45.0 MMHG  
 pO2 (POC) 101 (H) 80 - 100 MMHG  
 HCO3 (POC) 34.3 (H) 22 - 26 MMOL/L  
 sO2 (POC) 97 92 - 97 % Base excess (POC) 9 mmol/L Mode ASSIST CONTROL Tidal volume 450 ml Set Rate 15 bpm  
 PEEP/CPAP (POC) 15 cmH2O  
 PIP (POC) 15 Allens test (POC) N/A Inspiratory Time 0.9 sec Total resp. rate 16 Site DRAWN FROM ARTERIAL LINE Specimen type (POC) ARTERIAL Performed by [employer] Rout Volume control plus YES    
POC G3 Collection Time: 01/22/20 12:56 AM  
Result Value Ref Range Device: VENT    
 FIO2 (POC) 90 % pH (POC) 7.494 (H) 7.35 - 7.45    
 pCO2 (POC) 44.4 35.0 - 45.0 MMHG  
 pO2 (POC) 90 80 - 100 MMHG  
 HCO3 (POC) 34.2 (H) 22 - 26 MMOL/L  
 sO2 (POC) 98 (H) 92 - 97 % Base excess (POC) 11 mmol/L Mode ASSIST CONTROL Tidal volume 450 ml Set Rate 15 bpm  
 PIP (POC) 32 Allens test (POC) N/A Total resp. rate 33 Site DRAWN FROM ARTERIAL LINE Specimen type (POC) ARTERIAL Performed by [employer]ward Rout Volume control plus YES    
GLUCOSE, POC Collection Time: 01/22/20  1:16 AM  
Result Value Ref Range Glucose (POC) 280 (H) 70 - 110 mg/dL POC G3 Collection Time: 01/22/20  2:00 AM  
Result Value Ref Range Device: VENT    
 FIO2 (POC) 80 % pH (POC) 7.480 (H) 7.35 - 7.45    
 pCO2 (POC) 48.9 (H) 35.0 - 45.0 MMHG  
 pO2 (POC) 109 (H) 80 - 100 MMHG  
 HCO3 (POC) 36.5 (H) 22 - 26 MMOL/L  
 sO2 (POC) 98 (H) 92 - 97 % Base excess (POC) 13 mmol/L Mode ASSIST CONTROL Tidal volume 300 ml Set Rate 20 bpm  
 PEEP/CPAP (POC) 12 cmH2O  
 PIP (POC) 27 Allens test (POC) N/A Inspiratory Time 0.9 sec Total resp. rate 20 Site DRAWN FROM ARTERIAL LINE Specimen type (POC) ARTERIAL Performed by Faby Bennett Volume control plus YES Recent Labs  
  01/22/20 
0200 01/22/20 
0056 01/21/20 
2145 FIO2I 80 90 100 HCO3I 36.5* 34.2* 34.3*  
PCO2I 48.9* 44.4 62.8* PHI 7.480* 7.494* 7.345* PO2I 109* 90 101* Telemetry:AFIB Imaging: 
I have personally reviewed the patients radiographs and have reviewed the reports: CXR 1/21/20: 
Examination: Portable AP chest  
  
History: Intubated 
  
Comparison: January 16, 2020 
  
Findings: Endotracheal tube terminates approximately 3.7 cm above the rich. NG 
tube is in satisfactory position. There is interval development of multifocal 
airspace disease. There are likely layering pleural effusions. There is no 
definite pneumothorax, but assessment is limited by supine technique. Heart size 
is enlarged but stable. Atherosclerosis. 
  
IMPRESSION Impression: 1. Endotracheal and NG tube in satisfactory position. 
  
2.  Multifocal airspace disease which could represent either pulmonary edema or 
multifocal pneumonia. There are likely layering pleural effusions as well. ECHO U/S  [1/17/20]: 
 
Result status: Final result · Image quality for this study was technically difficult. · Normal cavity size, wall thickness and systolic function (ejection fraction normal). Estimated left ventricular ejection fraction is 60 - 65%. Visually measured ejection fraction. No regional wall motion abnormality noted. · Pulmonary arterial systolic pressure is 30 mmHg. · Trivial pericardial effusion adjacent to right ventricle. Note by: 
PETE Sherman 
[facility] Emergency Medicine PA Fellow Late entry for DOS 1/21/20 I saw and evaluated the patient, performing the key elements of the service. I discussed the findings, assessment and plan with the PA and agree with the PA's findings and plan as documented in the PA's note. All edits and changes made above or are mentioned in my addendum. Total of 124 min critical care time spent at bedside during the course of care providing evaluation,management and care decisions and ordering appropriate treatment related to critical care problems exclusive of procedures. The reason for providing this level of medical care for this critically ill patient was due a critical illness that impaired one or more vital organ systems such that there was a high probability of imminent or life threatening deterioration in the patients condition. This care involved high complexity decision making to assess, manipulate, and support vital system functions, to treat this degree vital organ system failure and to prevent further life threatening deterioration of the patients condition. 79 y/o female with PMH of HLD, HTN, presented to [facility] with complaints of intractable coughing for about 2 weeks, progresively worsening. Pt was admitted for CAP, being treated with Ceftriaxone and azithromycin. Pt found to have severe sepsis. Pt also at that time found to have a-fib with RVR, which persisted despite medical management. During the course of the hospitalization, pt became deliriuos and devleoped acute encephalopathy. Per PFM team, pt was doing better today and talking. Code blue was called around 7pm and I came over to [address] [place] [address] to assist.  I came and ran the code. Pt had suffered asystole cardiac arrest and CPR was occurring. Pt was noted to be a partial code -- pt wanted CPR and ACLS without intubation. When rosc was achieved, PFM spoke with famly and surrogate made pt maded her ok to intubate, so pt was intubated by anesthesia. Unfortunatley during that transition time, pt had a massive aspiration.   I performed a bedside bronchoscopy after the 3rd cardiac arrest and removed purulent secretions from her left and right lungs . BAL from the RLL and clearance of resuctions are occurring will fully. ABx broadened to vanc and zosyn. Much of the critical care time was spent in stabilizing post each cardiac arrest x 3. Also pt popped off vent and had to help RT trouble shoot the vent. I started the pt on conventional ventilattion. Pt had to be synchronized cardioverted and resulted in resolution of A-fib with RVR. Prognosis poor Lurdes García MD/MPH Pulmonary, Critical Care Medicine [facility]

## 2020-01-22 NOTE — PROGRESS NOTES
Brief Progress Note During Code Blue on 4N PFM called family to discuss code status. Daughter could not be reached. PFM talked to Son Edson Hanks, eldest child) and explained that mother had a cardiac arrest, CPR was underway. Her code status said DNI, which she had indicated that she wanted CPR but did not want intubation. We explained that with CPR we could potentially get a pulse but without intubation it could not be maintained. During call ROSC was established. Son gave permission to intubate at that time. Confirmed by myself and Dr. Gracia Bowden of sons confirmation.   
 
Ricardo Kim MD 
01/21/20 
9:15PM

## 2020-01-22 NOTE — PROGRESS NOTES
PCC UPDATE: 
Pts Afib with RVR with rates up to 190s became decompensated with MAPs that fell to 50s requiring amiodarone bolus and drip. Pt failed to adequately respond and emergent cardioversion was performed. Patient had 100J syncronized electro cardioversion x1. Rhythm converted to NSR with rate around 100s and hemodynamics improved. Continuing amio gtt at this time. Attempting to wean the epi gtt. Dr. Miya Smyth was present and oversaw the cardioversion and agrees with treatment plan.

## 2020-01-22 NOTE — PROCEDURES
[facility] Pulmonary Specialist  Arterial  Line Procedure Note with US Indication: hypotension post cardiac arrest 
 
Line was placed in an emergent setting without written or verbal consent s/p cardiac arrest. 
 
Line Bundle: 
Full sterile barrier precautions used. 7-Step Sterility Protocol followed. (cap, mask sterile gown, sterile gloves, large sterile sheet, hand hygiene, 2% chlorhexidine for cutaneous antisepsis) 5 mL 1% Lidocaine placed at insertion site. Left cannulated x 1 attempt(s) utilizing the modified Seldinger technique. Under Ultrasound guidenace Good blood return. Catheter secured & Biopatch applied. Sterile Tegaderm placed. Line was placed by myself under the supervision by Monica Díaz NP and Franklin Magana PA-C 
[facility] Emergency Medicine PA Fellow 9:00 PM 
 
 
I was present for and supervised the entire procedure. See PA note for details.

## 2020-01-22 NOTE — PROGRESS NOTES
Brief Progress Note Updated family (including oldest child, David Melara) about ROSC being achieved upon second code being called once patient was transferred to ICU. Patient has no living spouse. Writer explained to family that as long as her code status was \"full\", each time a pulse was lost, chest compressions and medications would be given in order to attempt achieving ROSC. Long term effects of  hypoperfusion/hypoventilation including neurological deficits were explained to family. Son Amy King stated that his mother would not wish to live in a diminished capacity. He voiced desire to change code status from Full to DNR. Code status change relayed to ICU attending and nursing.  
 
Hansa Herrera DO 
01/21/20 
8:44 PM

## 2020-01-22 NOTE — PROGRESS NOTES
Problem: Falls - Risk of 
Goal: *Absence of Falls Description Document Stanton Ba Fall Risk and appropriate interventions in the flowsheet. Outcome: Progressing Towards Goal 
Note: Fall Risk Interventions: 
Mobility Interventions: Strengthening exercises (ROM-active/passive), Bed/chair exit alarm, Communicate number of staff needed for ambulation/transfer Mentation Interventions: Adequate sleep, hydration, pain control, Bed/chair exit alarm, Door open when patient unattended, Reorient patient, Toileting rounds, Update white board Medication Interventions: Evaluate medications/consider consulting pharmacy Elimination Interventions: Toileting schedule/hourly rounds Problem: Pressure Injury - Risk of 
Goal: *Prevention of pressure injury Description Document Tristen Scale and appropriate interventions in the flowsheet. Outcome: Progressing Towards Goal 
Note: Pressure Injury Interventions: 
Sensory Interventions: Assess changes in LOC, Avoid rigorous massage over bony prominences, Keep linens dry and wrinkle-free, Minimize linen layers, Pressure redistribution bed/mattress (bed type), Turn and reposition approx. every two hours (pillows and wedges if needed) Moisture Interventions: Absorbent underpads, Apply protective barrier, creams and emollients, Internal/External urinary devices, Maintain skin hydration (lotion/cream), Minimize layers, Moisture barrier Activity Interventions: Pressure redistribution bed/mattress(bed type) Mobility Interventions: HOB 30 degrees or less, Pressure redistribution bed/mattress (bed type), Turn and reposition approx. every two hours(pillow and wedges) Nutrition Interventions: Discuss nutritional consult with provider, Document food/fluid/supplement intake Friction and Shear Interventions: Apply protective barrier, creams and emollients, Feet elevated on foot rest, HOB 30 degrees or less, Lift team/patient mobility team, Transferring/repositioning devices Problem: Patient Education: Go to Patient Education Activity Goal: Patient/Family Education Outcome: Progressing Towards Goal 
  
Problem: Pneumonia: Day 3 Goal: Activity/Safety Outcome: Progressing Towards Goal 
Goal: Consults, if ordered Outcome: Progressing Towards Goal 
Goal: Diagnostic Test/Procedures Outcome: Progressing Towards Goal 
Goal: Nutrition/Diet Outcome: Progressing Towards Goal 
Goal: Discharge Planning Outcome: Progressing Towards Goal 
Goal: Medications Outcome: Progressing Towards Goal 
Goal: Respiratory Outcome: Progressing Towards Goal 
Goal: Treatments/Interventions/Procedures Outcome: Progressing Towards Goal 
Goal: Psychosocial 
Outcome: Progressing Towards Goal 
Goal: *Oxygen saturation within defined limits Outcome: Progressing Towards Goal 
Goal: *Hemodynamically stable Outcome: Progressing Towards Goal 
Goal: *Demonstrates progressive activity Outcome: Progressing Towards Goal 
Goal: *Tolerating diet Outcome: Progressing Towards Goal 
Goal: *Describes available resources and support systems Outcome: Progressing Towards Goal 
Goal: *Optimal pain control at patient's stated goal 
Outcome: Progressing Towards Goal 
  
Problem: Patient Education: Go to Patient Education Activity Goal: Patient/Family Education Outcome: Progressing Towards Goal 
  
Problem: Afib Pathway: Day 1 Goal: Consults, if ordered Outcome: Progressing Towards Goal 
Goal: Diagnostic Test/Procedures Outcome: Progressing Towards Goal 
Goal: Medications Outcome: Progressing Towards Goal 
Goal: Treatments/Interventions/Procedures Outcome: Progressing Towards Goal 
Goal: Psychosocial 
Outcome: Progressing Towards Goal

## 2020-01-22 NOTE — DIABETES MGMT
GLYCEMIC CONTROL AND NUTRITION Assessment/Recommendations: 
Lab glucose this am 210 mg/dl Continue corrective insulin coverage as ordered Advanced to very insulin resistant coveraged Will continue inpatient monitoring Most recent blood glucose values: 
Results for Mae Galdamez (MRN [id number]) as of 1/22/2020 13:03 Ref. Range 1/21/2020 19:13 1/21/2020 20:53 1/22/2020 01:16 1/22/2020 05:05 1/22/2020 11:36  
GLUCOSE,FAST - POC Latest Ref Range: 70 - 110 mg/dL 249 (H)  280 (H) 225 (H) 130 (H) Current A1C of none % is equivalent to average blood glucose of ____mg/dl over the past 2-3 months. Current hospital diabetes medications:  
Lispro corrective insulin coverage every 6 hours Home diabetes medications: 
None noted Diet:   
NPO. Tube feeding Education:  ____Refer to Diabetes Education Record __x_Education not indicated at this time Romaine Paul RN CDE Ext [id number]

## 2020-01-23 NOTE — CONSULTS
[facility] Pulmonary, Critical Care, and Sleep Medicine Name: Alvan Aase MRN: [id number] : 1933 Hospital: [address] [place] [address] Date: 2020 Critical Care Progress Note IMPRESSION:  
· Cardiac arrest x3 - Code blue on the floor, most likely due to respiratory failure or possible aspiration. · Acute hypoxic respiratory failure requiring intubation - now with aspiration on top of severe Pneumonia. · Severe sepsis with septic shock - Pneumonia is presumed source. On day 5 out of 7 of Azithro and Rocephin. Blood cx on 20 no growth. for CAP, pt aspirated, and now needs polymicrobial coverage including for anaerobes · Atrial fibrillation with RVR - new dx since hospitalization. Antioagulated on lovenox. · Lactic acidosis · Hyperglycemia - No mention of DM in chart, Hyperglycemic today. · Acute versus acute on chronic encephalopathy: Improving, pt able to follow and open eyes. Etiology toxic/metabolic in nature, patient has severe sepsis, now has cardiac arrest, possible anoxic encephalopathy, although too soon to tell. · Cardiomyopathy, new onset: TTE post cardiac arrest shows LVEF of 25%, previously 1 week ago was 55%. Etiology likely due to cardiac arrest, unclear if this precipitated cardiac arrest 
· HTN  
· HLD · Hyponatremia upon initial ED presentation. Since resolved. · Dementia · Trigeminal Neuralgia - on Carbamezapine · UTI no initial cultures sent - unlikely source of sepsis from initial UA. Patient Active Problem List  
Diagnosis Code  Syncope R55  
 HTN (hypertension) I10  
 Trigeminal neuralgia G50.0  SLE (systemic lupus erythematosus) ([facility]) M32.9  Dyslipidemia E78.5  Hyponatremia E87.1  A-fib ([facility]) I48.91  
 Pneumonia J18.9  
 CAP (community acquired pneumonia) J18.9  Sepsis due to pneumonia ([facility]) J18.9, A41.9 RECOMMENDATIONS:  
 · Resp: Titrate FiO2/ supp O2 for SpO2 >90%; Intubated, maintaining lower Tidal Volumes at this time for concern for ARDS. Monitor Peak pressures. Routine Abgs · I/D: Septic with Leukocytosis, WBCs have doubled to 33 today; Sepsis bundle per hospital protocol, f/u BxCx, Sputum Cx's. LA ordered- initial and repeat q4hrs until normal. ABX :Broadening coverage with Zosyn and Vanc. Deescalate ABX once Cx's finalize. · Hem/Onc: Daily CBC; H/H, and plts are stable · CVS: s/p cardiac arrest in afib with RVR; continuous arterial pressure monitoring, Actively titrate vasopressors, aim MAP >65mmHg, Check cardiac panel, read repeat echo showing decreased EF, will consult cardiology tomorrow, wean hydrocortisone. · Metabolic: Daily BMP; monitor e-lytes; replace PRN. Placed on SSI · Renal: Trend Renal indices; not currently requiring diuresis, Bui · Endocrine: POC Glucose q6; TSH stable. · GI: SUP Protonix daily started, Trend LFTs, NGT to light suction. · Musc/Skin: No acute issues, wound care. · OB/GYN: no issues. · Neuro: Fentanyl  and versed gtt for sedation. Continue Carbamezapine for Trigeminal neuralgia. Patient's mental status has improved dramatically, will hold off on any head imaging · Code Status: DNR - See hospitalist note - After ROSC was achieved during code blue, Family wanted emergent intubation allowed. According to Dr. Abbi Beltre note on 1/21/20 she discussed with the family and they changed her code status from FULL to DNR. Best Practices/Safety Bundles: 
· Sepsis Bundle per Hospital Protocol · Glycemic control; avoid Hypoglycemia · IHI ICU Bundles: 
·  Central Line Bundle Followed , Bui Bundle Followed and Vent Bundle Followed, Vent Day 1   
· [facility] Vent patients/ Pulmonary pts:  
· VAP bundle, Aim to keep peak plateau pressure 76-96NQ H2O 
· Aspiration Precautions - HOB >30' · Daily sedation holiday as indicated · SBT as tolerated/appropriate · Stress ulcer prophylaxis: Protonix · DVT prophylaxis:Lovenox · Need for Lines, harrell assessed. · Restraints need. Subjective/History: This patient has been seen and evaluated at the request of Dr. Boston Solorzano for respiratory failure and cardiac arrest.   
 
01/22/20 Patient seen and examined at bedside. Overnight, patient suffered 3 cardiac arrest and transferred to the ICU, since admission to the ICU, patient underwent synchronized cardioversion for A. fib with RVR which was successful. Patient likely now has ARDS. Patient able to awaken and follow some commands, nursing notes that the patient is feisty. HPI History was obtained by medical records. Patient is a 80 y.o. female with a history of baseline dementia, HTN, HLD, SLE, trigeminal neuralgia and was admitted on 1/16/20 in afib with RVR with CAP and hyponatremia. Prior to admission patient had a cough for past 2 weeks. During hospitalization the patient was being treated for CAP with improving SIRS criteria, Bcx negative, started on rocephin and azithromycin in ED. Patient during hospital stay pt had been hemodynamically stable on antihypertensives, during the evening of 1/21/20pt started to require more O2 and was placed on NC, then around 18:48 pt went into cardiac arrest, code blue was called, and RRT achieved ROSC twice with 3 rounds of epi, final ROSC achieved at 1916. Unclear from treatment team as to why the patient had a cardiac arrest but presumably because she may have developed acute respiratory failure from possible aspiration. The patient is critically ill and can not provide additional history due to Unconsciousness and Ventilated. Past Medical History:  
Diagnosis Date  Dyslipidemia  H/O echocardiogram 07/2018 EF 65%, thickening of the mitral valve leaflets with annular calcification, no regurgitation or stenosis  Hypertension  Syncope 07/2018 No past surgical history on file. Prior to Admission medications Medication Sig Start Date End Date Taking? Authorizing Provider  
albuterol (PROVENTIL HFA, VENTOLIN HFA, PROAIR HFA) 90 mcg/actuation inhaler Take 2 Puffs by inhalation every six (6) hours as needed for Wheezing. Provider, Historical  
atorvastatin (LIPITOR) 10 mg tablet Take 10 mg by mouth daily. Provider, Historical  
multivit-min/iron/folic/lutein (CENTRUM SILVER WOMEN PO) Take  by mouth. Provider, Historical  
fish oil-omega-3 fatty acids 300-500 mg cap Take  by mouth. Provider, Historical  
vitamin E (AQUA GEMS) 400 unit capsule Take  by mouth daily. Provider, Historical  
AZO Sharifa Side 045-49-51 mg-mg-million tab Take  by mouth. Provider, Historical  
ascorbic acid, vitamin C, (VITAMIN C) 500 mg tablet Take  by mouth. Provider, Historical  
cholecalciferol (VITAMIN D3) 2,000 unit cap capsule Take  by mouth two (2) times a day. Provider, Historical  
amLODIPine (NORVASC) 5 mg tablet TK 1 T PO QD 9/28/18   Provider, Historical  
aspirin (ASPIRIN) 325 mg tablet Take 975 mg by mouth three (3) times daily. Provider, Historical  
carBAMazepine ER (CARBATROL ER) 100 mg capsule Take 100 mg by mouth two (2) times a day. Provider, Historical  
LORazepam (ATIVAN) 0.5 mg tablet Take  by mouth every eight (8) hours as needed for Anxiety. Provider, Historical  
montelukast (SINGULAIR) 10 mg tablet Take 10 mg by mouth as needed. Provider, Historical  
losartan (COZAAR) 100 mg tablet Take 100 mg by mouth daily. Other, MD David  
 
 
Current Facility-Administered Medications Medication Dose Route Frequency  pantoprazole (PROTONIX) 40 mg in 0.9% sodium chloride 10 mL injection  40 mg IntraVENous DAILY  insulin lispro (HUMALOG) injection   SubCUTAneous Q6H  
 amiodarone (NEXTERONE) 360 mg in dextrose 200 mL (1.8 mg/mL) infusion  1 mg/min IntraVENous TITRATE  hydrocortisone Sod Succ (PF) (SOLU-CORTEF) injection 50 mg  50 mg IntraVENous Q8H  
  sodium chloride 3% hypertonic nebulizer soln  4 mL Nebulization QID RT  
 albuterol (PROVENTIL VENTOLIN) nebulizer solution 2.5 mg  2.5 mg Nebulization QID RT  
 NOREPINephrine (LEVOPHED) 8 mg in 5% dextrose 250mL (32 mcg/mL) infusion  0.5-30 mcg/min IntraVENous TITRATE  multivitamin (MULTI-DELYN, WELLESSE) oral liquid 30 mL  30 mL Per NG tube DAILY  [START ON 1/23/2020] heparin (porcine) injection 5,000 Units  5,000 Units SubCUTAneous Q8H  
 VANCOMYCIN INFORMATION NOTE   Other Rx Dosing/Monitoring  chlorhexidine (PERIDEX) 0.12 % mouthwash 10 mL  10 mL Oral Q12H  
 fentaNYL (PF) 900 mcg/30 ml infusion soln  0-200 mcg/hr IntraVENous TITRATE  midazolam in normal saline (VERSED) 2 mg/mL infusion  1-10 mg/hr IntraVENous TITRATE  EPINEPHrine (ADRENALIN) 8 mg in 0.9% sodium chloride 250 mL infusion  1-10 mcg/min IntraVENous TITRATE  vasopressin (VASOSTRICT) 20 Units in 0.9% sodium chloride 100 mL infusion  0-0.04 Units/min IntraVENous TITRATE  piperacillin-tazobactam (ZOSYN) 3.375 g in 0.9% sodium chloride (MBP/ADV) 100 mL MBP  3.375 g IntraVENous Q6H  
 vitamin E acetate capsule 400 Units  400 Units Oral DAILY  ascorbic acid (vitamin C) (VITAMIN C) tablet 500 mg  500 mg Oral DAILY  atorvastatin (LIPITOR) tablet 10 mg  10 mg Oral DAILY  carBAMazepine XR (TEGretol XR) tablet 100 mg  100 mg Oral Q12H  cholecalciferol (VITAMIN D3) (400 Units /10 mcg) tablet 2.5 Tab  1,000 Units Oral DAILY  omega 3-DHA-EPA-fish oil 1,000 mg (120 mg-180 mg) capsule 1 Cap  1 Cap Oral DAILY Allergies Allergen Reactions  Ace Inhibitors Unknown (comments)  Chlorhexidine Other (comments) Hives and swelling on contact  Ciprofloxacin Unknown (comments)  Hibiclens [Chlorhexidine Gluconate] Rash  Lisinopril Cough  Lyrica [Pregabalin] Unknown (comments)  Macrobid [Nitrofurantoin Monohyd/M-Cryst] Unknown (comments)  Minoxidil Swelling  Neurontin [Gabapentin] Unknown (comments)  Nitrofurantoin Unknown (comments) Nerve pain  Other Medication Hives Most blood pressure meds  Sulfa (Sulfonamide Antibiotics) Unknown (comments)  Tenex [Guanfacine] Unknown (comments)  Zocor [Simvastatin] Unknown (comments) Social History Tobacco Use  Smoking status: Never Smoker  Smokeless tobacco: Never Used Substance Use Topics  Alcohol use: No  
  
 
No family history on file. Review of Systems: 
Review of systems not obtained due to patient factors. Objective:  
Vital Signs:   
Visit Vitals /52 Pulse 64 Temp 97.2 °F (36.2 °C) Resp 20 Ht 4' 10\" (1.473 m) Wt 68.5 kg (151 lb) SpO2 98% Breastfeeding No  
BMI 31.56 kg/m² O2 Device: Endotracheal tube, Ventilator O2 Flow Rate (L/min): 4 l/min Temp (24hrs), Av.5 °F (36.9 °C), Min:97.2 °F (36.2 °C), Max:99.7 °F (37.6 °C) Intake/Output:  
Last shift:      No intake/output data recorded. Last 3 shifts:  0701 -  1900 In: 2240.9 [P.O.:520; I.V.:1510.9] Out: 651 [Urine:650] Intake/Output Summary (Last 24 hours) at 2020 2348 Last data filed at 2020 1800 Gross per 24 hour Intake 1708.87 ml Output 300 ml Net 1408.87 ml Ventilator Settings: 
Mode Rate Tidal Volume Pressure FiO2 PEEP Assist control, VC+   300 ml    45 % 10 cm H20 Peak airway pressure: 25 cm H2O Minute ventilation: 6.16 l/min ARDS network Guidelines:  
Lung protective strategy and Plateau  Pressure goal < 30 cm H2O goals Oxygenation Goals PaO2 55-80 mm Hg or SaO2 88-95% PH goal 7.30-7.45 VAP bundle: 
Reviewed. [place] tube to suction at 20-30 cm Hg. Maintain [place] tube with 5-10ml air every 4 hours Routine oral care every 4 hours Elevation of head > 45 degree Daily sedation holiday and SBT evaluation starting at 6.00am. 
 
Physical Exam:  
 
General:  Intubated/Sedated, ET tube coming out of oral cavity, no acute lesions, unable to assess oral cavity with ET tube and OG tube in place Head:  Normocephalic, PERRLA small abrasion to lower lip. Eyes:  Conjunctivae/corneas clear. Pupils are sluggish and equal.  
Nose: Nares normal. Septum midline. Mucosa normal. No drainage or sinus tenderness. Neck: Supple, symmetrical, trachea midline, no adenopathy, no carotid bruit and no JVD. Lungs:   Symmetrical chest rise; good AE bilat; Rhonchi noted bilaterally throughout all lung fields Heart:  Irregularly irregular rhythm, , no m/r/g Abdomen:   Soft, non-tender. Bowel sounds normal. No masses. Extremities: Extremities normal, atraumatic, no cyanosis or edema. Pulses: 2+ and symmetric all extremities. Skin: Skin color, texture, turgor normal. No rashes or lesions Neurologic: Grossly nonfocal, pt opening eyes and responding to verbal commands Devices:  Left radial Art line, ETT, Left Fem CVL, NGT Data:  
 
Recent Results (from the past 24 hour(s)) URINALYSIS W/ RFLX MICROSCOPIC Collection Time: 01/22/20 12:15 AM  
Result Value Ref Range Color YELLOW Appearance CLEAR Specific gravity 1.019 1.005 - 1.030    
 pH (UA) 5.5 5.0 - 8.0 Protein 300 (A) NEG mg/dL Glucose NEGATIVE  NEG mg/dL Ketone NEGATIVE  NEG mg/dL Bilirubin NEGATIVE  NEG Blood NEGATIVE  NEG Urobilinogen 0.2 0.2 - 1.0 EU/dL Nitrites NEGATIVE  NEG Leukocyte Esterase NEGATIVE  NEG    
URINE MICROSCOPIC ONLY Collection Time: 01/22/20 12:15 AM  
Result Value Ref Range WBC 1 to 5 0 - 4 /hpf  
 RBC 1 to 5 0 - 5 /hpf Epithelial cells 1+ 0 - 5 /lpf Bacteria 1+ (A) NEG /hpf Hyaline cast 0 to 2 0 - 2 /lpf Granular cast 0 to 1 NEG /lpf POC G3 Collection Time: 01/22/20 12:56 AM  
Result Value Ref Range Device: VENT    
 FIO2 (POC) 90 %  pH (POC) 7.494 (H) 7.35 - 7.45    
 pCO2 (POC) 44.4 35.0 - 45.0 MMHG  
 pO2 (POC) 90 80 - 100 MMHG  
 HCO3 (POC) 34.2 (H) 22 - 26 MMOL/L  
 sO2 (POC) 98 (H) 92 - 97 % Base excess (POC) 11 mmol/L Mode ASSIST CONTROL Tidal volume 450 ml Set Rate 15 bpm  
 PIP (POC) 32 Allens test (POC) N/A Total resp. rate 33 Site DRAWN FROM ARTERIAL LINE Specimen type (POC) ARTERIAL Performed by [facility] Fix Volume control plus YES    
GLUCOSE, POC Collection Time: 01/22/20  1:16 AM  
Result Value Ref Range Glucose (POC) 280 (H) 70 - 110 mg/dL POC G3 Collection Time: 01/22/20  2:00 AM  
Result Value Ref Range Device: VENT    
 FIO2 (POC) 80 % pH (POC) 7.480 (H) 7.35 - 7.45    
 pCO2 (POC) 48.9 (H) 35.0 - 45.0 MMHG  
 pO2 (POC) 109 (H) 80 - 100 MMHG  
 HCO3 (POC) 36.5 (H) 22 - 26 MMOL/L  
 sO2 (POC) 98 (H) 92 - 97 % Base excess (POC) 13 mmol/L Mode ASSIST CONTROL Tidal volume 300 ml Set Rate 20 bpm  
 PEEP/CPAP (POC) 12 cmH2O  
 PIP (POC) 27 Allens test (POC) N/A Inspiratory Time 0.9 sec Total resp. rate 20 Site DRAWN FROM ARTERIAL LINE Specimen type (POC) ARTERIAL Performed by [place] Fix Volume control plus YES    
METABOLIC PANEL, BASIC Collection Time: 01/22/20  4:30 AM  
Result Value Ref Range Sodium 140 136 - 145 mmol/L Potassium 3.7 3.5 - 5.5 mmol/L Chloride 101 100 - 111 mmol/L  
 CO2 33 (H) 21 - 32 mmol/L Anion gap 6 3.0 - 18 mmol/L Glucose 210 (H) 74 - 99 mg/dL BUN 25 (H) 7.0 - 18 MG/DL Creatinine 1.27 0.6 - 1.3 MG/DL  
 BUN/Creatinine ratio 20 12 - 20 GFR est AA 48 (L) >60 ml/min/1.73m2 GFR est non-AA 40 (L) >60 ml/min/1.73m2 Calcium 8.1 (L) 8.5 - 10.1 MG/DL MAGNESIUM Collection Time: 01/22/20  4:30 AM  
Result Value Ref Range Magnesium 1.8 1.6 - 2.6 mg/dL CBC WITH AUTOMATED DIFF Collection Time: 01/22/20  4:30 AM  
Result Value Ref Range WBC 48.8 (H) 4.6 - 13.2 K/uL  
 RBC 3.01 (L) 4.20 - 5.30 M/uL HGB 9.7 (L) 12.0 - 16.0 g/dL HCT 29.6 (L) 35.0 - 45.0 %  MCV 98.3 (H) 74.0 - 97.0 FL  
 MCH 32.2 24.0 - 34.0 PG  
 MCHC 32.8 31.0 - 37.0 g/dL  
 RDW 12.6 11.6 - 14.5 % PLATELET 460 (H) 072 - 420 K/uL MPV 9.5 9.2 - 11.8 FL  
 NEUTROPHILS 90 (H) 42 - 75 % BAND NEUTROPHILS 6 (H) 0 - 5 % LYMPHOCYTES 3 (L) 20 - 51 % MONOCYTES 1 (L) 2 - 9 % EOSINOPHILS 0 0 - 5 % BASOPHILS 0 0 - 3 %  
 ABS. NEUTROPHILS 46.8 (H) 1.8 - 8.0 K/UL  
 ABS. LYMPHOCYTES 1.5 0.8 - 3.5 K/UL  
 ABS. MONOCYTES 0.5 0 - 1.0 K/UL  
 ABS. EOSINOPHILS 0.0 0.0 - 0.4 K/UL  
 ABS. BASOPHILS 0.0 0.0 - 0.06 K/UL  
 DF MANUAL PLATELET COMMENTS Increased Platelets RBC COMMENTS NORMOCYTIC, NORMOCHROMIC    
LACTIC ACID Collection Time: 01/22/20  4:30 AM  
Result Value Ref Range Lactic acid 3.4 (HH) 0.4 - 2.0 MMOL/L  
GLUCOSE, POC Collection Time: 01/22/20  5:05 AM  
Result Value Ref Range Glucose (POC) 225 (H) 70 - 110 mg/dL POC G3 Collection Time: 01/22/20  6:17 AM  
Result Value Ref Range Device: VENT    
 FIO2 (POC) 80 % pH (POC) 7.493 (H) 7.35 - 7.45    
 pCO2 (POC) 41.7 35.0 - 45.0 MMHG  
 pO2 (POC) 194 (H) 80 - 100 MMHG  
 HCO3 (POC) 32.0 (H) 22 - 26 MMOL/L  
 sO2 (POC) 100 (H) 92 - 97 % Base excess (POC) 9 mmol/L Mode ASSIST CONTROL Tidal volume 300 ml Set Rate 20 bpm  
 PEEP/CPAP (POC) 12 cmH2O  
 PIP (POC) 26 Allens test (POC) N/A Inspiratory Time 0.9 sec Total resp. rate 17 Site DRAWN FROM ARTERIAL LINE Specimen type (POC) ARTERIAL Performed by Tarah Matt Volume control plus YES    
GLUCOSE, POC Collection Time: 01/22/20 11:36 AM  
Result Value Ref Range Glucose (POC) 130 (H) 70 - 110 mg/dL ECHO ADULT FOLLOW-UP OR LIMITED Collection Time: 01/22/20  1:00 PM  
Result Value Ref Range Ao Root D 3.21 cm LVIDd 3.78 (A) 3.9 - 5.3 cm  
 LVPWd 1.19 (A) 0.6 - 0.9 cm LVIDs 2.57 cm IVSd 1.17 (A) 0.6 - 0.9 cm  
 LV ES Vol A4C 64.2 mL  
 LVOT d 1.94 cm  
 LV Ejection Fraction MOD 4C 27 %  LV Mass .7 (A) 67 - 162 g  
 LV Mass AL Index 105.6 (A) 43 - 95 g/m2 LV ED Vol A4C 87.9 mL Triscuspid Valve Regurgitation Peak Gradient 30.6 mmHg  
 TR Max Velocity 276.50 cm/s PASP 34.0 mmHg LVED Vol Index A4C 54.4 mL/m2 LVES Vol Index A4C 39.7 mL/m2 Left Ventricular Fractional Shortening by 2D 35.922606351 % Left Ventricular End Diastolic Volume by Teichholz Method 0.41499506218 mL Left Ventricular End Systolic Volume by Teichholz Method 9.3835147815 mL Left Ventricular Stroke Volume by Teichholz Method 40.473720318 mL Left Ventricular Stroke Volume by 2-D Single Plane- MOD 72.821012349 mL  
GLUCOSE, POC Collection Time: 01/22/20  4:36 PM  
Result Value Ref Range Glucose (POC) 175 (H) 70 - 110 mg/dL Recent Labs  
  01/22/20 
0617 01/22/20 
0200 01/22/20 
0056 FIO2I 80 80 90 HCO3I 32.0* 36.5* 34.2*  
PCO2I 41.7 48.9* 44.4 PHI 7.493* 7.480* 7.494* PO2I 194* 109* 90 Telemetry:AFIB Imaging: 
I have personally reviewed the patients radiographs and have reviewed the reports: CXR 1/21/20: 
Examination: Portable AP chest  
  
History: Intubated 
  
Comparison: January 16, 2020 
  
Findings: Endotracheal tube terminates approximately 3.7 cm above the rich. NG 
tube is in satisfactory position. There is interval development of multifocal 
airspace disease. There are likely layering pleural effusions. There is no 
definite pneumothorax, but assessment is limited by supine technique. Heart size 
is enlarged but stable. Atherosclerosis. 
  
IMPRESSION Impression: 1. Endotracheal and NG tube in satisfactory position. 
  
2.  Multifocal airspace disease which could represent either pulmonary edema or 
multifocal pneumonia. There are likely layering pleural effusions as well. ECHO U/S  [1/17/20]: 
 
Result status: Final result · Image quality for this study was technically difficult.  
· Normal cavity size, wall thickness and systolic function (ejection fraction normal). Estimated left ventricular ejection fraction is 60 - 65%. Visually measured ejection fraction. No regional wall motion abnormality noted. · Pulmonary arterial systolic pressure is 30 mmHg. · Trivial pericardial effusion adjacent to right ventricle. Total of 44 min critical care time spent at bedside during the course of care providing evaluation,management and care decisions and ordering appropriate treatment related to critical care problems exclusive of procedures. The reason for providing this level of medical care for this critically ill patient was due a critical illness that impaired one or more vital organ systems such that there was a high probability of imminent or life threatening deterioration in the patients condition. This care involved high complexity decision making to assess, manipulate, and support vital system functions, to treat this degree vital organ system failure and to prevent further life threatening deterioration of the patients condition. Emilee Eid MD/MPH Pulmonary, Critical Care Medicine [facility]

## 2020-01-23 NOTE — PROGRESS NOTES
[facility] - Progress Note Admit Date: 1/16/2020 Assessment:  
 
Hospital Problems  Date Reviewed: 6/6/2019 Codes Class Noted POA  
 CAP (community acquired pneumonia) ICD-10-CM: J18.9 ICD-9-CM: 281  1/17/2020 Unknown * (Principal) Sepsis due to pneumonia [facility]) ICD-10-CM: J18.9, A41.9 ICD-9-CM: 621, 995.91  1/17/2020 Unknown Hyponatremia ICD-10-CM: E87.1 ICD-9-CM: 276.1  1/16/2020 Unknown A-fib [facility]) ICD-10-CM: I48.91 
ICD-9-CM: 427.31  1/16/2020 Unknown Pneumonia ICD-10-CM: J18.9 ICD-9-CM: 661  1/16/2020 Unknown  
   
  
 
 
  
-S/p code blue arrest 1/21/2020. Like primary pulmonary process with aspiration resulting in bradycardic pulseless arrest. Patient is now intubated. -Sepsis likely secondary to Community Acquired Pneumonia along with [address] with EF now 25-30% s/p arrest (EF 60-65% 1/17/2020). -Atrial fibrillation with RVR. New diagnosis. Likely driven by underlying pulmonary process. Afib RVR with hypotension requiring emergent cardioversion 1/22/2020. Candidacy for long term [address] [place] [address] Road was being discussed prior to arrest, patient was on lovenox full dose which has now been stopped. -H/o syncope/bradycardia while on BB 7/2018. Patient underwent a pharmacologic nuclear stress test, echo and a 30-day event monitor at that time, all of which were normal.  
-Hypertension. Stable. -Hyperlipidemia. On statin. -SL with lupus nephritis. 
-Chronic HAJI. -Chronic pain, fibromyalgia. 
-Anxiety, depression, current delirium? 
-Normal EF 60-65% by echo this admission. 
-Patient is now DNR. 
  
Primary cardiologist Dr. Irma Puckett. Plan:  
 
Remains intubated and on pressor support. Rhythm stable on amio drip, reasonable to continue in setting of acute illness. Resume BB when BP will allow. Not on full dose anticoagulation with noted decline in Hgb.  
Noted decline in LV function s/p arrest. Do not suspect primary cardiac event. Will check follow up ECG. Does not appear volume overloaded. No BB/ace given low BP. Pending course and recovery can discuss further cardiac work-up but will need to continue to discuss goals of care with family. Noted patient is DNR. Continue electrolyte replacement as needed. Subjective:  
 
intubated Objective:  
  
Patient Vitals for the past 8 hrs: 
 Temp Pulse Resp BP SpO2  
01/23/20 1015  74 21  98 % 01/23/20 1000  73 21 (!) 101/39 98 % 01/23/20 0945  71 20  98 % 01/23/20 0930  68 20  98 % 01/23/20 0915  75 21  100 % 01/23/20 0908  61 20  99 % 01/23/20 0900  62 20 112/51 99 % 01/23/20 0845  60 20  99 % 01/23/20 0830  62 20  99 % 01/23/20 0815  64 19  99 % 01/23/20 0800 97.5 °F (36.4 °C) (!) 59 20 122/56 99 % 01/23/20 0745  62 20 121/55 99 % 01/23/20 0730  (!) 59 20 111/51 99 % 01/23/20 0715  61 20 110/44 99 % 01/23/20 0700  62 20 104/43 98 % 01/23/20 0645  60 20 107/53 98 % 01/23/20 0630  60 20 119/53 97 % 01/23/20 0615  (!) 59 20 116/52 97 % 01/23/20 0600  62 20 130/58 98 % 01/23/20 0545  66 20 125/54 99 % 01/23/20 0530  81 20 114/42 99 % 01/23/20 0515  75 20 100/44 98 % 01/23/20 0500  73 20 (!) 95/36 98 % 01/23/20 0445  70 20 97/44 98 % 01/23/20 0430  71 20 99/42 98 % 01/23/20 0421  69 20  98 % 01/23/20 0415  69 20 (!) 99/39 98 % 01/23/20 0400  70 20 99/42 98 % 01/23/20 0345  73 20 101/45 98 % 01/23/20 0330  76 20 125/47 96 % 01/23/20 0315  69 22 113/49 98 % 01/23/20 0300  67 21 112/48 97 % Patient Vitals for the past 96 hrs: 
 Weight  
01/22/20 1407 68.5 kg (151 lb)  
01/21/20 0548 68.5 kg (151 lb 0.2 oz) 01/20/20 0459 69 kg (152 lb 1.6 oz) Intake/Output Summary (Last 24 hours) at 1/23/2020 1050 Last data filed at 1/23/2020 0900 Gross per 24 hour Intake 1436.77 ml Output 465 ml Net 971.77 ml Physical Exam: 
General:  no distress Neck:  no JVD Lungs:  clear to auscultation bilaterally Heart:  regular rate and rhythm Abdomen:  abdomen is soft without significant tenderness, masses, organomegaly or guarding Extremities:  extremities normal, atraumatic, no cyanosis or edema Data Review:  
 
Labs: Results:  
   
Chemistry Recent Labs  
  01/23/20 
0545 01/22/20 0430 01/21/20 2053 * 210* 350*  140 139  
K 3.2* 3.7 4.0  
 101 98* CO2 31 33* 34* BUN 42* 25* 18  
CREA 2.69* 1.27 1.09  
CA 7.9* 8.1* 8.3*  
MG 2.1 1.8 2.3 PHOS  --   --  6.0* AGAP 8 6 7 BUCR 16 20 17 CBC w/Diff Recent Labs  
  01/23/20 
0545 01/22/20 0430 01/21/20 2053 WBC 26.7* 48.8* 33.0*  
RBC 2.69* 3.01* 3.17* HGB 8.7* 9.7* 10.4* HCT 26.0* 29.6* 31.9*  
 427* 448* GRANS 92* 90* 73 LYMPH 3* 3* 7* EOS 0 0 0 Cardiac Enzymes No results found for: CPK, CK, CKMMB, CKMB, RCK3, CKMBT, CKNDX, CKND1, MAYITO, TROPT, TROIQ, CARRIE, TROPT, TNIPOC, BNP, BNPP Coagulation No results for input(s): PTP, INR, APTT, INREXT in the last 72 hours. Lipid Panel No results found for: CHOL, CHOLPOCT, CHOLX, CHLST, CHOLV, 141485, HDL, HDLP, LDL, LDLC, DLDLP, 404320, VLDLC, VLDL, TGLX, TRIGL, TRIGP, TGLPOCT, CHHD, CHHDX  
BNP No results found for: BNP, BNPP, XBNPT Liver Enzymes No results for input(s): TP, ALB, TBIL, AP, SGOT, GPT in the last 72 hours. No lab exists for component: DBIL Digoxin Thyroid Studies Lab Results Component Value Date/Time TSH 2.63 01/17/2020 05:30 AM  
    
 
Signed By: PETE Armendariz   
 January 23, 2020

## 2020-01-23 NOTE — ROUTINE PROCESS
Bedside shift change report given to Gay Morlaes RN (oncoming nurse) by Nabor Cabrales RN (offgoing nurse). Report included the following information Kardex, OR Summary, Procedure Summary, Intake/Output and Alarm Parameters .

## 2020-01-23 NOTE — DIABETES MGMT
GLYCEMIC CONTROL AND NUTRITION Assessment/Recommendations: 
Lab glucose this am 193 mg/dl Lantus 10 units daily ordered to start today Continue corrective insulin coverage as ordered Already receiving very insulin resistant coverage Will continue inpatient monitoring Most recent blood glucose values: 
Results for Shaka Segura (MRN [id number]) as of 1/23/2020 12:22 Ref. Range 1/22/2020 11:36 1/22/2020 16:36 1/23/2020 00:46 1/23/2020 05:52 1/23/2020 11:28  
GLUCOSE,FAST - POC Latest Ref Range: 70 - 110 mg/dL 130 (H) 175 (H) 198 (H) 201 (H) 258 (H) Current A1C of none % is equivalent to average blood glucose of ____mg/dl over the past 2-3 months. Current hospital diabetes medications:  
Lispro corrective insulin coverage every 6 hours Home diabetes medications: 
None noted Diet:   
NPO. Tube feeding Education:  ____Refer to Diabetes Education Record __x_Education not indicated at this time Isacc Quispe RN CDE Ext [phone]

## 2020-01-23 NOTE — PROGRESS NOTES
0800:  Report received, care assumed. Complete assessment performed. No acute changes noted from report received. See flowsheets and MAR for details. 1200:  Agitated after turning and repositioned; attempting bite through ETT. Resp Therapy unable to insert bite block around ETT at this time as patient too agitated. Fentanyl and Versed drips continue for RASS -1. Fentanyl given for agitation, see MAR. Plan of care to family at bedside; they verbalize understanding all info. 1230:  Sedated on ventilator. Resp therapy inserted bite block around ETT without difficulty. Vasopressin weaned to OFF at this time. Two rings removed from Left ring finger per family request as edema present in hands; these two rings were handed to son Augustine Lucio by this RN. Family in to visit. Continue to wean Levophed at tolerated within ordered parameters. UOP remains low, ICU provider updated. 1400:  Levophed weaned to OFF at this time. NSR. VSS within ordered parameters. UOP remains low, ICU provider updated. 1500:  ICU provider updated to minimal UOP; ICU provider will begin diuresis tomorrow. Family in and out today, will continue keep updated. [id number]:  Cardiac monitor reveals conversion NSR to Uncontrolled Atrial Fibrillation 115-125/min. BP stable. Amiodarone infusing 0.5mg/min per orders. Cardiology service provider paged, notified of all this info; new orders noted to increase Amiodarone to 1mg/min x 6hours with parameters to decrease dose in 6hours, see chart for details. Amiodarone drip increased to 1mg/min now. Monitor. 2000:  Atrial Fib persists at 113/min. BP remains stable. Bedside and Verbal shift change report given to JAM Tompkins (oncoming nurse) by Virgina Bosworth, RN(offgoing nurse). Report included the following information SBAR, Kardex, Intake/Output, MAR, Accordion, Recent Results, Cardiac Rhythm Uncontrolled Atrial Fibrillation. and Alarm Parameters .

## 2020-01-23 NOTE — PROGRESS NOTES
attended the interdisciplinary rounds for Cele Robles, who is a 80 y.o.,female. Patients Primary Language is: [place]. According to the patients EMR Scientologist Affiliation is: No Mu-ism. The reason the Patient came to the hospital is:  
Patient Active Problem List  
 Diagnosis Date Noted  CAP (community acquired pneumonia) 01/17/2020  Sepsis due to pneumonia ([facility] 01/17/2020  Hyponatremia 01/16/2020  [facility] 01/16/2020  Pneumonia 01/16/2020  Dyslipidemia 08/07/2018  
 HTN (hypertension) 07/18/2018  Trigeminal neuralgia 07/18/2018  SLE (systemic lupus erythematosus) ([facility] 07/18/2018  Syncope 07/17/2018 Patient is a DNR. Plan: 
Chaplains will continue to follow and will provide pastoral care on an as needed/requested basis.  recommends bedside caregivers page  on duty if patient shows signs of acute spiritual or emotional distress. [address] [facility] Board Certified [facility]  
[phone]

## 2020-01-23 NOTE — PROGRESS NOTES
[address] [facility] Pulmonary, Critical Care, and Sleep Medicine Name: Parker Haji MRN: [id number] : 1933 Hospital: [facility] Date: 2020 Critical Care Progress Note IMPRESSION:  
· Cardiac arrest x3 - Code blue on the floor, most likely due to respiratory failure or possible aspiration. · Acute hypoxic respiratory failure requiring intubation - now with aspiration on top of severe Pneumonia. · Severe sepsis with septic shock - Pneumonia is presumed source. On day 5 out of 7 of Azithro and Rocephin. Blood cx on 20 no growth. for CAP, pt aspirated, and now needs polymicrobial coverage including for anaerobes · Atrial fibrillation with RVR - new dx since hospitalization. Antioagulated on lovenox. · Lactic acidosis · Hyperglycemia - No mention of DM in chart, Hyperglycemic today. · Acute versus acute on chronic encephalopathy: Improving, pt able to follow and open eyes. Etiology toxic/metabolic in nature, patient has severe sepsis, now has cardiac arrest, possible anoxic encephalopathy, although too soon to tell. · Cardiomyopathy, new onset: TTE post cardiac arrest shows LVEF of 25%, previously 1 week ago was 55%. Etiology likely due to cardiac arrest, unclear if this precipitated cardiac arrest 
· HTN  
· HLD · Hyponatremia upon initial ED presentation. Since resolved. · Dementia · Trigeminal Neuralgia - on Carbamezapine · UTI no initial cultures sent - unlikely source of sepsis from initial UA. Patient Active Problem List  
Diagnosis Code  Syncope R55  
 HTN (hypertension) I10  
 Trigeminal neuralgia G50.0  SLE (systemic lupus erythematosus) ([facility]) M32.9  Dyslipidemia E78.5  Hyponatremia E87.1  A-fib ([facility]) I48.91  
 Pneumonia J18.9  
 CAP (community acquired pneumonia) J18.9  Sepsis due to pneumonia ([facility]) J18.9, A41.9 RECOMMENDATIONS:  
 · Resp: Titrate FiO2/ supp O2 for SpO2 >90%; Intubated, maintaining lower Tidal Volumes at this time for concern for ARDS. Monitor Peak pressures. Routine Abgs, PRN and scheduled nebs. · I/D: Septic with Leukocytosis continuing to climb. Sepsis bundle per hospital protocol, f/u BxCx, Sputum Cx's. LA ordered- initial and repeat q4hrs until normal. ABX :  Zosyn and Vanc. Deescalate ABX once Cx's finalize. · Hem/Onc: Daily CBC; H/H, and plts are stable · CVS: Today has remained NSR on amio gtt, yesterday s/p cardiac arrest in afib with RVR; continuous arterial pressure monitoring, Actively titrate vasopressors, aim MAP >65mmHg, Check cardiac panel, read repeat echo showing decreased EF, Cardiology consulted, weaning hydrocortisone. Off Epi. · Metabolic: Daily BMP; monitor e-lytes; replace PRN. Placed on SSI · Renal: Trend Renal indices; not currently requiring diuresis, Bui · Endocrine: POC Glucose q6; TSH stable. · GI: SUP Protonix daily, Trend LFTs, NGT to light suction. · Musc/Skin: No acute issues, wound care. · OB/GYN: no issues. · Neuro: Fentanyl  and versed gtt for sedation. Continue Carbamezapine for Trigeminal neuralgia. Patient's mental status has improved dramatically, will hold off on any head imaging · Code Status: DNR - See hospitalist note - After ROSC was achieved during code blue, Family wanted emergent intubation allowed. According to Dr. Ruben River note on 1/21/20 she discussed with the family and they changed her code status from FULL to DNR. Best Practices/Safety Bundles: 
· Sepsis Bundle per Hospital Protocol · Glycemic control; avoid Hypoglycemia · IHI ICU Bundles: 
·  Central Line Bundle Followed , Bui Bundle Followed and Vent Bundle Followed, Vent Day 2   
· [facility]:  
· VAP bundle, Aim to keep peak plateau pressure 66-76WT H2O 
· Aspiration Precautions - HOB >30' · Daily sedation holiday as indicated · SBT as tolerated/appropriate · Stress ulcer prophylaxis: Protonix · DVT prophylaxis:Lovenox · Need for Lines, harrell assessed. · Restraints need. Subjective/History: This patient has been seen and evaluated at the request of Dr. Jhonathan Ruiz for respiratory failure and cardiac arrest.   
 
 Hospital Day 2: 
Ventilator Day 2: 
01/23/20 Patient seen and examined at bedside. Patient has had no overnight events and remained in NSR with stable BP. Nursing staff able to wean epi off yesterday and she is currently on 2mcg/min NE and 0.04 Vaso. Patients mentation appears arousable but confused. HPI History was obtained by medical records. Patient is a 80 y.o. female with a history of baseline dementia, HTN, HLD, SLE, trigeminal neuralgia and was admitted on 1/16/20 in afib with RVR with CAP and hyponatremia. Prior to admission patient had a cough for past 2 weeks. During hospitalization the patient was being treated for CAP with improving SIRS criteria, Bcx negative, started on rocephin and azithromycin in ED. Patient during hospital stay pt had been hemodynamically stable on antihypertensives, during the evening of 1/21/20pt started to require more O2 and was placed on NC, then around 18:48 pt went into cardiac arrest, code blue was called, and RRT achieved ROSC twice with 3 rounds of epi, final ROSC achieved at 1916. Unclear from treatment team as to why the patient had a cardiac arrest but presumably because she may have developed acute respiratory failure from possible aspiration. 1/22/20 Patient remained NSR on amio gtt and had good neurologic return moving all extremities but does appear confused, uncertain if at pts baseline dementia. The patient is critically ill and can not provide additional history due to Unconsciousness and Ventilated. Past Medical History:  
Diagnosis Date  Dyslipidemia  H/O echocardiogram 07/2018  EF 65%, thickening of the mitral valve leaflets with annular calcification, no regurgitation or stenosis  Hypertension  Syncope 07/2018 No past surgical history on file. Prior to Admission medications Medication Sig Start Date End Date Taking? Authorizing Provider  
albuterol (PROVENTIL HFA, VENTOLIN HFA, PROAIR HFA) 90 mcg/actuation inhaler Take 2 Puffs by inhalation every six (6) hours as needed for Wheezing. Provider, Historical  
atorvastatin (LIPITOR) 10 mg tablet Take 10 mg by mouth daily. Provider, Historical  
multivit-min/iron/folic/lutein (CENTRUM SILVER WOMEN PO) Take  by mouth. Provider, Historical  
fish oil-omega-3 fatty acids 300-500 mg cap Take  by mouth. Provider, Historical  
vitamin E (AQUA GEMS) 400 unit capsule Take  by mouth daily. Provider, Historical  
AZO [place] [id number] mg-mg-million tab Take  by mouth. Provider, Historical  
ascorbic acid, vitamin C, (VITAMIN C) 500 mg tablet Take  by mouth. Provider, Historical  
cholecalciferol (VITAMIN D3) 2,000 unit cap capsule Take  by mouth two (2) times a day. Provider, Historical  
amLODIPine (NORVASC) 5 mg tablet TK 1 T PO QD 9/28/18   Provider, Historical  
aspirin (ASPIRIN) 325 mg tablet Take 975 mg by mouth three (3) times daily. Provider, Historical  
carBAMazepine ER (CARBATROL ER) 100 mg capsule Take 100 mg by mouth two (2) times a day. Provider, Historical  
LORazepam (ATIVAN) 0.5 mg tablet Take  by mouth every eight (8) hours as needed for Anxiety. Provider, Historical  
montelukast (SINGULAIR) 10 mg tablet Take 10 mg by mouth as needed. Provider, Historical  
losartan (COZAAR) 100 mg tablet Take 100 mg by mouth daily. Other, MD David  
 
 
Current Facility-Administered Medications Medication Dose Route Frequency  pantoprazole (PROTONIX) 40 mg in 0.9% sodium chloride 10 mL injection  40 mg IntraVENous DAILY  insulin lispro (HUMALOG) injection   SubCUTAneous Q6H  
  amiodarone (NEXTERONE) 360 mg in dextrose 200 mL (1.8 mg/mL) infusion  1 mg/min IntraVENous TITRATE  hydrocortisone Sod Succ (PF) (SOLU-CORTEF) injection 50 mg  50 mg IntraVENous Q8H  
 sodium chloride 3% hypertonic nebulizer soln  4 mL Nebulization QID RT  
 albuterol (PROVENTIL VENTOLIN) nebulizer solution 2.5 mg  2.5 mg Nebulization QID RT  
 NOREPINephrine (LEVOPHED) 8 mg in 5% dextrose 250mL (32 mcg/mL) infusion  0.5-30 mcg/min IntraVENous TITRATE  multivitamin (MULTI-DELYN, WELLESSE) oral liquid 30 mL  30 mL Per NG tube DAILY  heparin (porcine) injection 5,000 Units  5,000 Units SubCUTAneous Q8H  
 VANCOMYCIN INFORMATION NOTE   Other Rx Dosing/Monitoring  chlorhexidine (PERIDEX) 0.12 % mouthwash 10 mL  10 mL Oral Q12H  
 fentaNYL (PF) 900 mcg/30 ml infusion soln  0-200 mcg/hr IntraVENous TITRATE  midazolam in normal saline (VERSED) 2 mg/mL infusion  1-10 mg/hr IntraVENous TITRATE  EPINEPHrine (ADRENALIN) 8 mg in 0.9% sodium chloride 250 mL infusion  1-10 mcg/min IntraVENous TITRATE  vasopressin (VASOSTRICT) 20 Units in 0.9% sodium chloride 100 mL infusion  0-0.04 Units/min IntraVENous TITRATE  piperacillin-tazobactam (ZOSYN) 3.375 g in 0.9% sodium chloride (MBP/ADV) 100 mL MBP  3.375 g IntraVENous Q6H  
 vitamin E acetate capsule 400 Units  400 Units Oral DAILY  ascorbic acid (vitamin C) (VITAMIN C) tablet 500 mg  500 mg Oral DAILY  atorvastatin (LIPITOR) tablet 10 mg  10 mg Oral DAILY  carBAMazepine XR (TEGretol XR) tablet 100 mg  100 mg Oral Q12H  cholecalciferol (VITAMIN D3) (400 Units /10 mcg) tablet 2.5 Tab  1,000 Units Oral DAILY  omega 3-DHA-EPA-fish oil 1,000 mg (120 mg-180 mg) capsule 1 Cap  1 Cap Oral DAILY Allergies Allergen Reactions  Ace Inhibitors Unknown (comments)  Chlorhexidine Other (comments) Hives and swelling on contact  Ciprofloxacin Unknown (comments)  Hibiclens [Chlorhexidine Gluconate] Rash  Lisinopril Cough  Lyrica [Pregabalin] Unknown (comments)  Macrobid [Nitrofurantoin Monohyd/M-Cryst] Unknown (comments)  Minoxidil Swelling  Neurontin [Gabapentin] Unknown (comments)  Nitrofurantoin Unknown (comments) Nerve pain  Other Medication Hives Most blood pressure meds  Sulfa (Sulfonamide Antibiotics) Unknown (comments)  Tenex [Guanfacine] Unknown (comments)  Zocor [Simvastatin] Unknown (comments) Social History Tobacco Use  Smoking status: Never Smoker  Smokeless tobacco: Never Used Substance Use Topics  Alcohol use: No  
  
 
No family history on file. Review of Systems: 
Review of systems not obtained due to patient factors. Objective:  
Vital Signs:   
Visit Vitals /47 Pulse 76 Temp 97.2 °F (36.2 °C) Resp 20 Ht 4' 10\" (1.473 m) Wt 68.5 kg (151 lb) SpO2 96% Breastfeeding No  
BMI 31.56 kg/m² O2 Device: Endotracheal tube, Ventilator O2 Flow Rate (L/min): 4 l/min Temp (24hrs), Av.5 °F (36.9 °C), Min:97.2 °F (36.2 °C), Max:99.7 °F (37.6 °C) Intake/Output:  
Last shift:      No intake/output data recorded. Last 3 shifts:  0701 -  1900 In: 2240.9 [P.O.:520; I.V.:1510.9] Out: 651 [Urine:650] Intake/Output Summary (Last 24 hours) at 2020 0407 Last data filed at 2020 1800 Gross per 24 hour Intake 1707.37 ml Output 300 ml Net 1407.37 ml Ventilator Settings: 
Mode Rate Tidal Volume Pressure FiO2 PEEP Assist control, VC+   300 ml    45 % 10 cm H20 Peak airway pressure: 25 cm H2O Minute ventilation: 6.29 l/min ARDS network Guidelines:  
Lung protective strategy and Plateau  Pressure goal < 30 cm H2O goals Oxygenation Goals PaO2 55-80 mm Hg or SaO2 88-95% PH goal 7.30-7.45 VAP bundle: 
Reviewed. Alejandra tube to suction at 20-30 cm Hg. Maintain [place] tube with 5-10ml air every 4 hours Routine oral care every 4 hours Elevation of head > 45 degree Daily sedation holiday and SBT evaluation starting at 6.00am. 
 
Physical Exam:  
 
General:  Intubated/Sedated, ET tube coming out of oral cavity, no acute lesions, unable to assess oral cavity with ET tube and OG tube in place Head:  Normocephalic, PERRLA small abrasion to lower lip. Eyes:  Conjunctivae/corneas clear. Pupils are sluggish and equal.  
Nose: Nares normal. Septum midline. Mucosa normal. No drainage or sinus tenderness. Neck: Supple, symmetrical, trachea midline, no adenopathy, no carotid bruit and no JVD. Lungs:   Symmetrical chest rise; good AE bilat; Trace end expiratory wheezing noted. Heart:  Irregularly irregular rhythm, , no m/r/g Abdomen:   Soft, non-tender. Bowel sounds normal. No masses. Extremities: Extremities normal, atraumatic, no cyanosis or edema. Pulses: 2+ and symmetric all extremities. Skin: Skin color, texture, turgor normal. No rashes or lesions Neurologic: Grossly nonfocal, pt opening eyes and responding to verbal commands, moves all extremities Devices:  Left radial Art line, ETT, Left Fem CVL, NGT Data:  
 
Recent Results (from the past 24 hour(s)) METABOLIC PANEL, BASIC Collection Time: 01/22/20  4:30 AM  
Result Value Ref Range Sodium 140 136 - 145 mmol/L Potassium 3.7 3.5 - 5.5 mmol/L Chloride 101 100 - 111 mmol/L  
 CO2 33 (H) 21 - 32 mmol/L Anion gap 6 3.0 - 18 mmol/L Glucose 210 (H) 74 - 99 mg/dL BUN 25 (H) 7.0 - 18 MG/DL Creatinine 1.27 0.6 - 1.3 MG/DL  
 BUN/Creatinine ratio 20 12 - 20 GFR est AA 48 (L) >60 ml/min/1.73m2 GFR est non-AA 40 (L) >60 ml/min/1.73m2 Calcium 8.1 (L) 8.5 - 10.1 MG/DL MAGNESIUM Collection Time: 01/22/20  4:30 AM  
Result Value Ref Range Magnesium 1.8 1.6 - 2.6 mg/dL CBC WITH AUTOMATED DIFF Collection Time: 01/22/20  4:30 AM  
Result Value Ref Range WBC 48.8 (H) 4.6 - 13.2 K/uL  
 RBC 3.01 (L) 4.20 - 5.30 M/uL HGB 9.7 (L) 12.0 - 16.0 g/dL HCT 29.6 (L) 35.0 - 45.0 % MCV 98.3 (H) 74.0 - 97.0 FL  
 MCH 32.2 24.0 - 34.0 PG  
 MCHC 32.8 31.0 - 37.0 g/dL  
 RDW 12.6 11.6 - 14.5 % PLATELET 854 (H) 380 - 420 K/uL MPV 9.5 9.2 - 11.8 FL  
 NEUTROPHILS 90 (H) 42 - 75 % BAND NEUTROPHILS 6 (H) 0 - 5 % LYMPHOCYTES 3 (L) 20 - 51 % MONOCYTES 1 (L) 2 - 9 % EOSINOPHILS 0 0 - 5 % BASOPHILS 0 0 - 3 %  
 ABS. NEUTROPHILS 46.8 (H) 1.8 - 8.0 K/UL  
 ABS. LYMPHOCYTES 1.5 0.8 - 3.5 K/UL  
 ABS. MONOCYTES 0.5 0 - 1.0 K/UL  
 ABS. EOSINOPHILS 0.0 0.0 - 0.4 K/UL  
 ABS. BASOPHILS 0.0 0.0 - 0.06 K/UL  
 DF MANUAL PLATELET COMMENTS Increased Platelets RBC COMMENTS NORMOCYTIC, NORMOCHROMIC    
LACTIC ACID Collection Time: 01/22/20  4:30 AM  
Result Value Ref Range Lactic acid 3.4 (HH) 0.4 - 2.0 MMOL/L  
GLUCOSE, POC Collection Time: 01/22/20  5:05 AM  
Result Value Ref Range Glucose (POC) 225 (H) 70 - 110 mg/dL POC G3 Collection Time: 01/22/20  6:17 AM  
Result Value Ref Range Device: VENT    
 FIO2 (POC) 80 % pH (POC) 7.493 (H) 7.35 - 7.45    
 pCO2 (POC) 41.7 35.0 - 45.0 MMHG  
 pO2 (POC) 194 (H) 80 - 100 MMHG  
 HCO3 (POC) 32.0 (H) 22 - 26 MMOL/L  
 sO2 (POC) 100 (H) 92 - 97 % Base excess (POC) 9 mmol/L Mode ASSIST CONTROL Tidal volume 300 ml Set Rate 20 bpm  
 PEEP/CPAP (POC) 12 cmH2O  
 PIP (POC) 26 Allens test (POC) N/A Inspiratory Time 0.9 sec Total resp. rate 17 Site DRAWN FROM ARTERIAL LINE Specimen type (POC) ARTERIAL Performed by Lauren Red Volume control plus YES    
GLUCOSE, POC Collection Time: 01/22/20 11:36 AM  
Result Value Ref Range Glucose (POC) 130 (H) 70 - 110 mg/dL ECHO ADULT FOLLOW-UP OR LIMITED Collection Time: 01/22/20  1:00 PM  
Result Value Ref Range Ao Root D 3.21 cm LVIDd 3.78 (A) 3.9 - 5.3 cm  
 LVPWd 1.19 (A) 0.6 - 0.9 cm LVIDs 2.57 cm  IVSd 1.17 (A) 0.6 - 0.9 cm  
 LV ES Vol A4C 64.2 mL  
 LVOT d 1.94 cm  
 LV Ejection Fraction MOD 4C 27 % LV Mass .7 (A) 67 - 162 g  
 LV Mass AL Index 105.6 (A) 43 - 95 g/m2 LV ED Vol A4C 87.9 mL Triscuspid Valve Regurgitation Peak Gradient 30.6 mmHg  
 TR Max Velocity 276.50 cm/s PASP 34.0 mmHg LVED Vol Index A4C 54.4 mL/m2 LVES Vol Index A4C 39.7 mL/m2 Left Ventricular Fractional Shortening by 2D 18.654565007 % Left Ventricular End Diastolic Volume by Teichholz Method 6.28678335897 mL Left Ventricular End Systolic Volume by Teichholz Method 6.5754056894 mL Left Ventricular Stroke Volume by Teichholz Method 30.641854810 mL Left Ventricular Stroke Volume by 2-D Single Plane- MOD 77.632156129 mL  
GLUCOSE, POC Collection Time: 01/22/20  4:36 PM  
Result Value Ref Range Glucose (POC) 175 (H) 70 - 110 mg/dL GLUCOSE, POC Collection Time: 01/23/20 12:46 AM  
Result Value Ref Range Glucose (POC) 198 (H) 70 - 110 mg/dL Recent Labs  
  01/22/20 
0617 01/22/20 
0200 01/22/20 
0056 FIO2I 80 80 90 HCO3I 32.0* 36.5* 34.2*  
PCO2I 41.7 48.9* 44.4 PHI 7.493* 7.480* 7.494* PO2I 194* 109* 90 Telemetry:normal sinus rhythm Imaging: 
I have personally reviewed the patients radiographs and have reviewed the reports: CXR 1/21/20: 
Examination: Portable AP chest  
  
History: Intubated 
  
Comparison: January 16, 2020 
  
Findings: Endotracheal tube terminates approximately 3.7 cm above the rich. NG 
tube is in satisfactory position. There is interval development of multifocal 
airspace disease. There are likely layering pleural effusions. There is no 
definite pneumothorax, but assessment is limited by supine technique. Heart size 
is enlarged but stable. Atherosclerosis. 
  
IMPRESSION Impression: 1. Endotracheal and NG tube in satisfactory position. 
  
2.  Multifocal airspace disease which could represent either pulmonary edema or 
multifocal pneumonia. There are likely layering pleural effusions as well. ECHO U/S  [1/17/20]: 
 
Result status: Final result · Image quality for this study was technically difficult. · Normal cavity size, wall thickness and systolic function (ejection fraction normal). Estimated left ventricular ejection fraction is 60 - 65%. Visually measured ejection fraction. No regional wall motion abnormality noted. · Pulmonary arterial systolic pressure is 30 mmHg. · Trivial pericardial effusion adjacent to right ventricle. Note By: 
Michelle العلي PA-C 
[facility] Emergency medicine PA Fellow Late entry for date of service 1/23/2020 I saw and evaluated the patient, performing the key elements of the service. I discussed the findings, assessment and plan with the PA and agree with the PA's findings and plan as documented in the PA's note. All edits and changes made above or are mentioned in my addendum. Total of 38 min critical care time spent at bedside during the course of care providing evaluation,management and care decisions and ordering appropriate treatment related to critical care problems exclusive of procedures. The reason for providing this level of medical care for this critically ill patient was due a critical illness that impaired one or more vital organ systems such that there was a high probability of imminent or life threatening deterioration in the patients condition. This care involved high complexity decision making to assess, manipulate, and support vital system functions, to treat this degree vital organ system failure and to prevent further life threatening deterioration of the patients condition. 79 y/o female with PMH of HLD, HTN, presented to [facility] with complaints of intractable coughing for about 2 weeks, progresively worsening. Pt was admitted for CAP, being treated with Ceftriaxone and azithromycin. Pt found to have severe sepsis.   Pt also at that time found to have a-fib with RVR, which persisted despite medical management. During the course of the hospitalization, pt became deliriuos and devleoped acute encephalopathy. Per PFM team, pt was doing better but then pt suffered cardiac arrest with code blue was called around 7pm Tuesday and I came over to [address] [place] [address] to assist.  I came and ran the code. Pt had suffered asystole cardiac arrest and CPR was occurring. Pt was noted to be a partial code -- pt wanted CPR and ACLS without intubation. When rosc was achieved, PFM spoke with famly and surrogate made pt maded her ok to intubate, so pt was intubated by anesthesia. Unfortunatley during that transition time, pt had a massive aspiration. I performed a bedside bronchoscopy after the 3rd cardiac arrest and removed purulent secretions from her left and right lungs . BAL from the RLL and clearance of resuctions are occurring will fully -- Cx NGTD. ABx broadened to vanc and zosy, which we will continue today and consider de-escalating. Today, patient continues to have improved mental status, a little more difficult to arouse than yesterday, but still following commands per nursing. Patient's ventilation status being weaned down, patient has acute hypoxic respiratory failure. Patient also appears to have component of  stunned myocardium, repeat echo yesterday shows LVEF of 25-30%, cardiology was consulted, in agreement that this is likely Takotsubo cardiomyopathy versus CAD, advised to continue supportive measures. .  Patient developing increased lower extremity edema, and anasarca. I will likely begin fluid removal starting tomorrow. Patient also having issues now with acute renal failure, which is likely due to ischemic ATN. We will continue to avoid nephrotoxins, and hold off on diuretics until creatinine is stabilized. Of note the patient presented to the ICU with A. fib with RVR post cardiac arrest, underwent cardioversion, with resolution of A. fib. Lactic acidosis is also resolved, trended down from 6.3 down to 1.1 today. 
  
Prognosis poor Lane Hinojosa MD/MPH Pulmonary, Critical Care Medicine [facility]

## 2020-01-23 NOTE — PROGRESS NOTES
conducted a Follow up consultation and Spiritual Assessment for Rich Lopez, who is a 80 y.o.,female. The  provided the following Interventions: 
Continued the relationship of care and support. Listened empathically. Offered prayer and assurance of continued prayer on patients behalf. Chart reviewed. The following outcomes were achieved: 
Daughter Ranjeet Llamas) expressed gratitude for 's visit. Assessment: According to the daughter, patient's ronak in God is very important for her to cope. Plan: 
Chaplains will continue to follow and will provide pastoral care on an as needed/requested basis.  recommends bedside caregivers page  on duty if patient shows signs of acute spiritual or emotional distress. Cecilda Proud Board Certified [address] [place] [address] Care  
[phone]

## 2020-01-23 NOTE — PROGRESS NOTES
[address] Progress Note Patient: Nicol Hough MRN: [id number] SSN: [id number]  YOB: 1933 Age: 80 y.o. Sex: female Admit Date: 1/16/2020 LOS: 7 days Chief Complaint Patient presents with  Shortness of Breath  Irregular Heart Beat RVR Subjective: This AM pt intubated and sedated, and on pressors in ICU. ROS - not done to due to Pt conditions. Objective:  
 
Visit Vitals BP (!) 111/38 Pulse 72 Temp 98.5 °F (36.9 °C) Resp 20 Ht 4' 10\" (1.473 m) Wt 68.5 kg (151 lb) SpO2 99% Breastfeeding No  
BMI 31.56 kg/m² Physical Exam:  
General appearance: intubated and sedated Lungs: diffuse crackles throughout Heart: RRR Abdomen: soft, non-tender. Bowel sounds+ Pulses: 2+ and symmetric Intake and Output: 
Current Shift: 01/23 0701 - 01/23 1900 In: 536.8 [I.V.:386.8] Out: 125 [Urine:125] Last three shifts: 01/21 1901 - 01/23 0700 In: 2354.7 [I.V.:1754.7] Out: 600 [Urine:600] Lab/Data Review: 
Recent Results (from the past 12 hour(s)) GLUCOSE, POC Collection Time: 01/23/20 11:28 AM  
Result Value Ref Range Glucose (POC) 258 (H) 70 - 110 mg/dL GLUCOSE, POC Collection Time: 01/23/20  5:05 PM  
Result Value Ref Range Glucose (POC) 193 (H) 70 - 110 mg/dL Assessment and Plan:  
 
80 y.o. female with PMH significant for dementia, HLD, HTN, SLE now admitted with CAP and new onset Afib w/ RVR s/p cardiac arrest x3. Now in ICU. S/p Cardiac Arrest  
Pt admitted for sepsis 2/2 CAP being tx with IV azithro and rocephin. Bcx (1/16) neg. Cardiac arrest x3 (1/21). Intubated on pressors in ICU. Pt code status changed to DNR per family. Labs suggesting septic shock (Wcc 33, LA 6.3), started on zosyn and vanc. CXR (1/21) showing multifocal airspace disease which could represent either pulmonary edema or multifocal pneumonia.  Etiology of arrest likely resp arrest 2/2 to aspiration. Leukocytosis is now improving down to 26.6 and lactate 1.1. Repeat CXR today showing improved aeration at the right lung base. Grossly unchanged left lung consolidations. A-fib w/RVR - afib w RVR on admission likely 2/2 to underlying PNA. New diagnosis for PT. On dilt drip then transitioned to PO and metoprolol. Following arrest  
rates up to 190 (1/22) leading to decomp. Amiodarone bolus then drip started. ECHO on admission of (1/17) EF of 60-65%, now with severe systolic dysfunction EF 34-00%. Pt remains in NSR w/ rates controlled on amiodarone drip. UTI on admission - asymtpomatic H/o HTN now hypotensive- on amlodipine and losartan at home. Now on pressors. H/o Dementia - on donepezil. Family reports no confusion at baseline prior to admission. Sundowning prior to arrest. HLD - on statin at home. Asthma - singular, ventolin at home. Trigeminal Neuralgia - on carbamazepine. ICU primary. We appreciate your care of our pt. We will continue to follow. Francisco Wilson MD PGY-1 
[address]

## 2020-01-23 NOTE — PROGRESS NOTES
NUTRITION Nutrition Screen RECOMMENDATIONS / PLAN:  
 
- Continue to advance tube feeding of Vital High Protein as tolerated by 10 mL q 4 hours to goal rate of 40 mL/hr with daily multivitamin and 50 mL q 4 hour water flushes.  
- Continue RD inpatient monitoring and evaluation. Goal Regimen: Vital High Protein at 40 mL/hr + 50 mL q 4 hour water flushes to provide: 960 kcal, 84 gm protein, 108 gm CHO, 0 gm fiber, 803 mL free water, 1103 mL total water, 68% RDIs NUTRITION INTERVENTIONS & DIAGNOSIS:  
 
- Enteral nutrition: continue advancement - Vitamin and mineral supplement therapy: MVI  
- Collaboration and referral of nutrition care: interdisciplinary rounds Nutrition Diagnosis: Inadequate oral intake related to respiratory status as evidenced by pt NPO. ASSESSMENT:  
 
1/23: Tolerating feeds and advancing.  
1/22: S/p cardiac arrest and intubated. Nutritional intake adequate to meet patients estimated nutritional needs:  No  
 
Tube Feeding: Vital High Protein at 30 mL/hr via OGT Water Flushes: 50 mL q 4 hours Residuals: 0 mL Diet: DIET TUBE FEEDING Food Allergies: NKFA Current Appetite: Not Applicable - NPO Appetite/meal intake prior to admission: Unable to determine at this time Feeding Limitations:  [] Swallowing difficulty    [] Chewing difficulty    [x] Other: respiratory status Current Meal Intake:  
Patient Vitals for the past 100 hrs: 
 % Diet Eaten 01/21/20 1258 15 % 01/21/20 0944 25 % 01/20/20 1343 10 % 01/20/20 0700 25 % 01/19/20 1422 50 % 01/19/20 1031 25 % BM: 1/23 Skin Integrity: WDL Edema:   [x] No     [] Yes Pertinent Medications: Reviewed: ascorbic acid, atorvastatin, cholecalciferol, levophed, omega 3 DHA, EPA fish oil, pantoprazole, vasopressin, vitamin E Recent Labs  
  01/23/20 
0545 01/22/20 
0430 01/21/20 
2053  140 139  
K 3.2* 3.7 4.0  
 101 98* CO2 31 33* 34* * 210* 350* BUN 42* 25* 18  
 CREA 2.69* 1.27 1.09  
CA 7.9* 8.1* 8.3*  
MG 2.1 1.8 2.3 PHOS  --   --  6.0* Intake/Output Summary (Last 24 hours) at 1/23/2020 1109 Last data filed at 1/23/2020 0900 Gross per 24 hour Intake 1436.77 ml Output 465 ml Net 971.77 ml Anthropometrics: 
Ht Readings from Last 1 Encounters:  
01/22/20 4' 10\" (1.473 m) Last 3 Recorded Weights in this Encounter 01/20/20 0459 01/21/20 0548 01/22/20 1407 Weight: 69 kg (152 lb 1.6 oz) 68.5 kg (151 lb 0.2 oz) 68.5 kg (151 lb) Body mass index is 31.56 kg/m². Obese, Class I Weight History:  
Weight Metrics 1/22/2020 6/6/2019 10/9/2018 8/14/2018 8/7/2018 7/18/2018 6/20/2018 Weight 151 lb 157 lb 161 lb 159 lb 159 lb 152 lb 152 lb BMI 31.56 kg/m2 32.81 kg/m2 33.65 kg/m2 33.23 kg/m2 33.23 kg/m2 31.77 kg/m2 31.77 kg/m2 Admitting Diagnosis: A-fib ([facility] [address] [I48.91] Pneumonia [J18.9] Hyponatremia [E87.1] CAP (community acquired pneumonia) [J18.9] Pertinent PMHx: dementia, HTN, HLD Education Needs:        [x] None identified  [] Identified - Not appropriate at this time  []  Identified and addressed - refer to education log Learning Limitations:   [] None identified  [x] Identified: altered mentation Cultural, Latter-day & ethnic food preferences:  [x] None identified    [] Identified and addressed ESTIMATED NUTRITION NEEDS:  
 
Calories: 759-966 kcal (11-14 kcal/kg) based on  [x] Actual BW 68 kg     [] IBW Protein:  gm (2-2.5 gm/kg) based on  [] Actual BW      [x] IBW 41 kg Fluid: 1 mL/kcal 
  
MONITORING & EVALUATION:  
 
Nutrition Goal(s):  
- Enteral nutrition intake will meet >75% of patient estimated nutritional needs within the next 7 days.    Outcome: Progressing towards goal  
 
Monitoring:   [x] Food and nutrient intake   [x] Food and nutrient administration  [x] Comparative standards   [x] Nutrition-focused physical findings   [x] Anthropometric Measurements   [x] Treatment/therapy   [x] Biochemical data, medical tests, and procedures Previous Recommendations (for follow-up assessments only): Implemented Discharge Planning: Nutritional discharge needs unknown at this time. Participated in care planning, discharge planning, & interdisciplinary rounds as appropriate. Bettye Guerrero RD, [facility] Pager: [phone]

## 2020-01-24 NOTE — PROGRESS NOTES
[facility]. Pulmonary, Critical Care, and Sleep Medicine Name: Bertha Bonilla MRN: [id number] : 1933 Hospital: [address] [place] [address] Dr Date: 2020  Admission Date: 2020 Chart and notes reviewed. Data reviewed. I have evaluated all findings. [x]I have reviewed the flowsheet and previous days notes. [x]The patient is unable to give any meaningful history or review of systems because the patient is: 
[x]Intubated [x]Sedated  
[]Unresponsive [x]The patient is critically ill on     
[x]Mechanical ventilation [x]Pressors []BiPAP [] Patient is a 80 y.o. female with a history of baseline dementia, HTN, HLD, SLE, trigeminal neuralgia and was admitted on 20 in afib with RVR with CAP and hyponatremia. Prior to admission patient had a cough for past 2 weeks. During hospitalization the patient was being treated for CAP with improving SIRS criteria, Bcx negative, started on rocephin and azithromycin in ED. Patient during hospital stay pt had been hemodynamically stable on antihypertensives, during the evening of 20pt started to require more O2 and was placed on NC, then around 18:48 pt went into cardiac arrest, code blue was called, and RRT achieved ROSC twice with 3 rounds of epi, final ROSC achieved at 1916. Unclear from treatment team as to why the patient had a cardiac arrest but presumably because she may have developed acute respiratory failure from possible aspiration. Transferred to ICU for higher level care.  
  
20 Patient agitated this morning on exam. Slightly tachycardic up to 115 overnight, otherwise no acute events overnight. Spoke with patients family this morning, were questioning their decision to resuscitate and intubate the patient in the first place. ...stated that she would have never wanted to be sustained by machines. Mentation: Sedated, awakens to voice, follows commands Respiratory/ Secretions: Ventilator, thick tan secretions Hemodynamics: HD stable Urine output: very minimal, 90ml overnight ROS:Review of systems not obtained due to patient factors. Events and notes from last 24 hours reviewed. Care plan discussed on multidisciplinary rounds. Patient Active Problem List  
Diagnosis Code  Syncope R55  
 HTN (hypertension) I10  
 Trigeminal neuralgia G50.0  SLE (systemic lupus erythematosus) ([facility]) M32.9  Dyslipidemia E78.5  Hyponatremia E87.1  A-fib ([facility]) I48.91  
 Pneumonia J18.9  
 CAP (community acquired pneumonia) J18.9  Sepsis due to pneumonia ([facility]) J18.9, A41.9 Vital Signs: 
Visit Vitals /43 Pulse 76 Temp 98.9 °F (37.2 °C) Resp 21 Ht 4' 10\" (1.473 m) Wt 68.5 kg (151 lb) SpO2 97% Breastfeeding No  
BMI 31.56 kg/m² O2 Device: Endotracheal tube, Ventilator O2 Flow Rate (L/min): 4 l/min Temp (24hrs), Av.5 °F (36.9 °C), Min:98.2 °F (36.8 °C), Max:98.9 °F (37.2 °C) Intake/Output:  
Last shift:      No intake/output data recorded. Last 3 shifts:  1901 -  0700 In: 2628.3 [I.V.:1408.3] Out: 534 [Urine:534] Intake/Output Summary (Last 24 hours) at 2020 3831 Last data filed at 2020 0700 Gross per 24 hour Intake 1934.05 ml Output 184 ml Net 1750.05 ml Ventilator Settings: 
Ventilator Mode: Assist control Respiratory Rate Back-Up Rate: 16 Insp Time (sec): 1 sec I:E Ratio: 1:3.5 Ventilator Volumes Vt Set (ml): 300 ml Vt Exhaled (Machine Breath) (ml): 317 ml Vt Spont (ml): 318 ml Ve Observed (l/min): 5.4 l/min Ventilator Pressures PIP Observed (cm H2O): 19 cm H2O Plateau Pressure (cm H2O): 20 cm H2O 
MAP (cm H2O): 10 PEEP/VENT (cm H2O): 6 cm H20 Current Facility-Administered Medications Medication Dose Route Frequency  famotidine (PF) (PEPCID) 20 mg in 0.9% sodium chloride 10 mL injection  20 mg IntraVENous Q24H  insulin glargine (LANTUS) injection 10 Units  10 Units SubCUTAneous DAILY  hydrocortisone Sod Succ (PF) (SOLU-CORTEF) injection 50 mg  50 mg IntraVENous Q12H  
 NOREPINephrine (LEVOPHED) 8 mg in 5% dextrose 250mL (32 mcg/mL) infusion  0.5-30 mcg/min IntraVENous TITRATE  vasopressin (VASOSTRICT) 20 Units in 0.9% sodium chloride 100 mL infusion  0-0.04 Units/min IntraVENous TITRATE  piperacillin-tazobactam (ZOSYN) 2.25 g in 0.9% sodium chloride (MBP/ADV) 50 mL MBP  2.25 g IntraVENous Q6H  
 insulin lispro (HUMALOG) injection   SubCUTAneous Q6H  
 amiodarone (NEXTERONE) 360 mg in dextrose 200 mL (1.8 mg/mL) infusion  1 mg/min IntraVENous TITRATE  sodium chloride 3% hypertonic nebulizer soln  4 mL Nebulization QID RT  
 albuterol (PROVENTIL VENTOLIN) nebulizer solution 2.5 mg  2.5 mg Nebulization QID RT  
 multivitamin (MULTI-DELYN, WELLESSE) oral liquid 30 mL  30 mL Per NG tube DAILY  heparin (porcine) injection 5,000 Units  5,000 Units SubCUTAneous Q8H  
 VANCOMYCIN INFORMATION NOTE   Other Rx Dosing/Monitoring  chlorhexidine (PERIDEX) 0.12 % mouthwash 10 mL  10 mL Oral Q12H  
 fentaNYL (PF) 900 mcg/30 ml infusion soln  0-200 mcg/hr IntraVENous TITRATE  midazolam in normal saline (VERSED) 2 mg/mL infusion  1-10 mg/hr IntraVENous TITRATE  vitamin E acetate capsule 400 Units  400 Units Oral DAILY  ascorbic acid (vitamin C) (VITAMIN C) tablet 500 mg  500 mg Oral DAILY  atorvastatin (LIPITOR) tablet 10 mg  10 mg Oral DAILY  carBAMazepine XR (TEGretol XR) tablet 100 mg  100 mg Oral Q12H  cholecalciferol (VITAMIN D3) (400 Units /10 mcg) tablet 2.5 Tab  1,000 Units Oral DAILY  omega 3-DHA-EPA-fish oil 1,000 mg (120 mg-180 mg) capsule 1 Cap  1 Cap Oral DAILY Telemetry: Sinus rhythm Physical Exam:  
 General: sedated on ventilator, but agitated this morning HEENT: ED, mucous membranes moist 
 Lungs: symmetric air entry but diffusely coarse breath sounds Heart: Regular rate and rhythm,  S1S2 present Abdomen:  tenderness, masses, organomegaly or guarding\"} Extremity: diffuse anasarca, 2+ edema in bilateral UE, 1+ in bilateral LE. LLE larger than RLE. Neuro: agitated, follows commands Skin: Skin color, texture, turgor normal. No rashes or lesions DATA: 
MAR reviewed and pertinent medications noted or modified as needed Labs: 
Recent Labs  
  01/24/20 
0400 01/23/20 
0545 01/22/20 
0430 WBC 15.9* 26.7* 48.8* HGB 7.4* 8.7* 9.7* HCT 21.9* 26.0* 29.6*  314 427* Recent Labs  
  01/24/20 
0400 01/23/20 
0545 01/22/20 
0430 01/21/20 
2053  140 140 139  
K 3.2* 3.2* 3.7 4.0  
 101 101 98* CO2 27 31 33* 34* * 193* 210* 350* BUN 56* 42* 25* 18  
CREA 3.74* 2.69* 1.27 1.09  
CA 7.4* 7.9* 8.1* 8.3*  
MG 2.1 2.1 1.8 2.3 PHOS  --   --   --  6.0* No results for input(s): PH, PCO2, PO2, HCO3, FIO2 in the last 72 hours. Recent Labs  
  01/24/20 
0414 01/23/20 
0423 01/22/20 
7437 FIO2I 40 45 80 HCO3I 29.0* 32.9* 32.0*  
PCO2I 38.1 39.7 41.7 PHI 7.489* 7.527* 7.493* PO2I 112* 95 194* Imaging: 
[x]   I have personally reviewed the patients radiographs and reports XR Results (most recent): 
Results from Hospital Encounter encounter on 01/16/20 XR CHEST PORT Narrative EXAM: XR CHEST PORT INDICATION: 80 years Female. Respiratory Failure; Check ETT Placement. ADDITIONAL HISTORY: None. TECHNIQUE: Frontal view of the chest. 
 
COMPARISON: 1/22/2020 0528 hours FINDINGS: 
 
The patient is rotated to the right. Endotracheal tube with tip 4.8 cm proximal to rich. There is an enteric tube 
which extends inferior to the field-of-view. Cardiac silhouette is at the upper limits of normal in size, similar to prior. There is prominence and tortuosity of the thoracic aorta. Atherosclerotic 
calcifications are noted in the aorta. There are hazy opacities within the bilateral mid and lower lung zones. The left 
lung opacities are similar to prior. There has been slight improved aeration of 
the right mid/lower lung zones. No definite evidence of pleural effusion. No 
definite pneumothorax. Osseous structures are unchanged in appearance. Redemonstrated ascitic density 
projecting medial to the left proximal humerus, possible intra-articular body 
within the axillary pouch or heterotopic ossification due to prior injury. Impression IMPRESSION: 
1. Support devices in acceptable positions, as above. 2.  Slightly improved aeration at the right lung base. Grossly unchanged left 
lung consolidations. CT Results (most recent): 
Results from Hospital Encounter encounter on 07/17/18 CT HEAD WO CONT Narrative CT HEAD WITHOUT IV CONTRAST INDICATION: Syncope/fainting. COMPARISON: CT head 2/27/2018. TECHNIQUE: Serial axial CT images were obtained from the skull vertex to foramen 
magnum without IV contrast. All CT scans at this facility are performed using 
dose optimization technique as appropriate to a performed exam, to include 
automated exposure control, adjustment of the mA and/or kV according to 
patient's size (including appropriate matching for site-specific examinations), 
or use of iterative reconstruction technique. FINDINGS: 
Visualized paranasal sinuses are free from significant mucosal disease. Candance Huger Buck-white matter differentiation appears preserved. Moderate 
periventricular/cerebral white matter hypoattenuation, grossly unchanged. .No 
evidence for acute intracranial hemorrhage, acute infarct, or mass lesion. No 
evidence for hydrocephalus. . The calvarium appears intact. Impression IMPRESSION: 
No evidence for acute intracranial process. Candance Huger Moderate white matter disease, presumed chronic ischemic. A preliminary reading was placed in PACS by Dr. Aron Mcdermott at [phone] hours 7/18/2018. IMPRESSION:  
· Cardiac arrest x3 - Code blue on the floor, most likely due to respiratory failure or possible aspiration. · Acute hypoxic respiratory failure requiring intubation - now with aspiration on top of severe pneumonia. · Severe sepsis with septic shock- likely 2/2 pneumonia · Atrial fibrillation with RVR- cardioverted 1/22 and has been in NSR since · Lactic acidosis- resolved · Hyperglycemia - No hx of DM · Acute versus acute on chronic encephalopathy- toxic/metabolic vs anoxic. Despite long arrest, patient is awakening, alert, and following commands · Cardiomyopathy, new onset: TTE post cardiac arrest shows LVEF of 25%, previously 1 week ago was 55%. Etiology likely due to cardiac arrest, unclear if this precipitated cardiac arrest 
· JENNIE- likely ischemic ATN- Cr continues to climb, UOP continues to decrease · HTN  
· HLD · Anemia- no active bleeding · Hyponatremia- resolved · Electrolytes- hypokalemia, hypomagnesemia · Dementia/delerium · Trigeminal Neuralgia - on Carbamezapine · UTI no initial cultures sent - unlikely source of sepsis from initial UA.   
   
  
 
Patient Active Problem List  
Diagnosis Code  Syncope R55  
 HTN (hypertension) I10  
 Trigeminal neuralgia G50.0  SLE (systemic lupus erythematosus) ([facility]) M32.9  Dyslipidemia E78.5  Hyponatremia E87.1  A-fib ([facility]) I48.91  
 Pneumonia J18.9  
 CAP (community acquired pneumonia) J18.9  Sepsis due to pneumonia ([facility]) J18.9, A41.9 RECOMMENDATIONS:  
· Resp: Titrate FiO2/ supp O2 for SpO2 >90%; Routine Abgs, PRN and scheduled nebs. Wean vent as tolerated. FU procalcitonin. · I/D: WBCs downtrending today. Afebrile overnight. Continue Vanc/Zosyn. Cultures negative to date. CXR unchanged with bilateral opacities and moderate bilateral pleural effusions · Hem/Onc: Daily CBC. Monitor for signs of active bleeding. Transfuse for hbg <7. · CVS: NSR on amio gtt. Titrate vasopressors, aim MAP >65mmHg, will try to wean. Cardiology following- recommend continuing amio drip, start BB when BP can tolerate · Metabolic: Daily BMP; monitor e-lytes; replace PRN. Continue SSI · Renal: Trend Renal indices Cr continues to increase, very minimal urine output. Nephrology consulted, will FU recs. Patient's family is very clear that patient would not want dialysis · Endocrine: POC Glucose q6; TSH WNL · GI: SUP Protonix daily, TF with goal of 40/hr- advance as tolerated · Musc/Skin: No acute issues, wound care as needed · Neuro: Fentanyl  and versed gtt for sedation for RAAS 0 to -1. Continue Carbamezapine for Trigeminal neuralgia. Monitor neuro status closely, patient able to follow commands today. · Code Status: DNR - See hospitalist note - After ROSC was achieved during code blue, Family wanted emergent intubation allowed. According to Dr. Cleo Maurer note on 1/21/20 she discussed with the family and they changed her code status from FULL to DNR. Palliative care consulted to discuss goals of care with family per request.   
 
Best practice : 
 
Glycemic control IHI ICU bundles:  
 Central Line Bundle Followed , Harrell Bundle Followed and Vent Bundle Followed, Vent Day 2 [facility] Vent patients- VAP bundle, aim to keep peak plateau pressure 28-95HU H2O Sress ulcer prophylaxis. DVT prophylaxis. Need for Lines, harrell assessed. Restraints need. Palliative care evaluation. High complexity decision making was performed during this consultation and evaluation. [x]       Pt is at high risk for further organ failure and dysfunction. Lizzie Rodriguez MD, [facility] PGY1 I saw and evaluated the patient, performing the key elements of the service. I discussed the findings, assessment and plan with the resident and agree with the resident's findings and plan as documented in the resident's note. Total of 37 min critical care time spent at bedside during the course of care providing evaluation,management and care decisions and ordering appropriate treatment related to critical care problems exclusive of procedures. The reason for providing this level of medical care for this critically ill patient was due a critical illness that impaired one or more vital organ systems such that there was a high probability of imminent or life threatening deterioration in the patients condition. This care involved high complexity decision making to assess, manipulate, and support vital system functions, to treat this degree vital organ system failure and to prevent further life threatening deterioration of the patients condition.  
 
 
79 y/o female with PMH of HLD, HTN, presented to [address] [place] [address] with complaints of intractable coughing for about 2 weeks, progresively worsening.  Pt was admitted for CAP, being treated with Ceftriaxone and azithromycin.  Pt found to have severe sepsis.  Pt also at that time found to have a-fib with RVR, which persisted despite medical management.  During the course of the hospitalization, pt became deliriuos and devleoped acute encephalopathy.  Per PFM team, pt was doing better but then pt suffered cardiac arrest with code blue was called around 7pm Tuesday and I came over to [address] [place] [address] to Imani Kerr came and ran the code.  Pt had suffered asystole cardiac arrest and CPR was occurring.  Pt was noted to be a partial code -- pt wanted CPR and ACLS without intubation. Ana Calloway rosc was achieved, PFM spoke with famly and surrogate made pt maded her ok to intubate, so pt was intubated by anesthesia.  Unfortunatley during that transition time, pt had a massive aspiration.  I performed a bedside bronchoscopy after the 3rd cardiac arrest and removed purulent secretions from her left and right lungs .   BAL from the RLL and clearance of secretions  -- Cx NGTD.  ABx broadened to vanc and zosyn, which will now de-escalate today by discontinuing vancomycin and continuing Zosyn. Today, patient continues to have improved mental status, following commands per nursing. Patient's ventilation status being weaned down, patient has acute hypoxic respiratory failure. Patient also appears to have component of  stunned myocardium, repeat echo yesterday shows LVEF of 25-30%, cardiology was consulted, in agreement that this is likely Takotsubo cardiomyopathy versus CAD, advised to continue supportive measure --- they are waiting for neurological recovery, patient is able to follow commands, so I suspect this should be able to performed interventions, however I believe that may be limited by patient's elevated creatinine. .  Patient developing increased lower extremity edema, and anasarca. I will likely begin fluid removal however pt has worsening renal function, likely due to ischemic ATN. Due to this, I have consulted nephrology, family indicates that patient would not want any form of dialysis including short-term or long-term, so Dr. Juwan Montenegro is performing albumin with Lasix challenge to see if patient can open up and diurese. .  With regards to septic shock, patient was weaned off of Levophed yesterday, with marginal blood pressures at this time. Patient did have another episode of A. fib with RVR early this morning, but resolved. Also of note, patient has elevated blood glucose, added Lantus and corrective insulin Of note the patient presented to the ICU with A. fib with RVR post cardiac arrest, underwent cardioversion, with resolution of A. fib. Lactic acidosis has also resolved 
  
Prognosis poor Bridgett Essex MD/MPH Pulmonary, Critical Care Medicine [facility]

## 2020-01-24 NOTE — PROGRESS NOTES
Problem: Falls - Risk of 
Goal: *Absence of Falls Description Document Hortensia Akins Fall Risk and appropriate interventions in the flowsheet. Outcome: Progressing Towards Goal 
Note: Fall Risk Interventions: 
Mobility Interventions: Assess mobility with egress test, Bed/chair exit alarm, Communicate number of staff needed for ambulation/transfer, OT consult for ADLs, PT Consult for mobility concerns, PT Consult for assist device competence, Strengthening exercises (ROM-active/passive) Mentation Interventions: Adequate sleep, hydration, pain control, Door open when patient unattended, Evaluate medications/consider consulting pharmacy, More frequent rounding, Reorient patient, Room close to nurse's station, Toileting rounds, Update white board Medication Interventions: Evaluate medications/consider consulting pharmacy Elimination Interventions: Bed/chair exit alarm, Call light in reach, Toileting schedule/hourly rounds Problem: Pressure Injury - Risk of 
Goal: *Prevention of pressure injury Description Document Tristen Scale and appropriate interventions in the flowsheet. Outcome: Progressing Towards Goal 
Note: Pressure Injury Interventions: 
Sensory Interventions: Assess changes in LOC, Float heels, Minimize linen layers, Monitor skin under medical devices, Pad between skin to skin, Turn and reposition approx. every two hours (pillows and wedges if needed) Moisture Interventions: Absorbent underpads, Apply protective barrier, creams and emollients, Internal/External urinary devices, Minimize layers Activity Interventions: Assess need for specialty bed Mobility Interventions: Turn and reposition approx. every two hours(pillow and wedges), PT/OT evaluation, Float heels Nutrition Interventions: Document food/fluid/supplement intake, Discuss nutritional consult with provider Friction and Shear Interventions: Apply protective barrier, creams and emollients, Foam dressings/transparent film/skin sealants, Lift sheet, Lift team/patient mobility team 
 
  
 
 
 
  
Problem: Nutrition Deficit Goal: *Optimize nutritional status Outcome: Progressing Towards Goal 
  
Problem: Non-Violent Restraints Goal: *Removal from restraints as soon as assessed to be safe Outcome: Progressing Towards Goal 
Goal: *No harm/injury to patient while restraints in use Outcome: Progressing Towards Goal 
Goal: *Patient's dignity will be maintained Outcome: Progressing Towards Goal 
Goal: *Patient Specific Goal (EDIT GOAL, INSERT TEXT) Outcome: Progressing Towards Goal 
Goal: Non-violent Restaints:Standard Interventions Outcome: Progressing Towards Goal 
Goal: Non-violent Restraints:Patient Interventions Outcome: Progressing Towards Goal 
Goal: Patient/Family Education Outcome: Progressing Towards Goal 
  
Problem: Ventilator Management Goal: *Adequate oxygenation and ventilation Outcome: Progressing Towards Goal 
Goal: *Patient maintains clear airway/free of aspiration Outcome: Progressing Towards Goal 
Goal: *Absence of infection signs and symptoms Outcome: Progressing Towards Goal 
Goal: *Normal spontaneous ventilation Outcome: Progressing Towards Goal 
  
Problem: Afib Pathway: Day 1 Goal: Consults, if ordered Outcome: Progressing Towards Goal 
Goal: Diagnostic Test/Procedures Outcome: Progressing Towards Goal 
Goal: Medications Outcome: Progressing Towards Goal 
Goal: Treatments/Interventions/Procedures Outcome: Progressing Towards Goal 
Goal: Psychosocial 
Outcome: Progressing Towards Goal

## 2020-01-24 NOTE — PROGRESS NOTES
NUTRITION Nutrition Screen RECOMMENDATIONS / PLAN:  
 
- Continue tube feeding of Vital High Protein at goal rate of 40 mL/hr with daily multivitamin and 50 mL q 4 hour water flushes.  
- Continue RD inpatient monitoring and evaluation. Goal Regimen: Vital High Protein at 40 mL/hr + 50 mL q 4 hour water flushes to provide: 960 kcal, 84 gm protein, 108 gm CHO, 0 gm fiber, 803 mL free water, 1103 mL total water, 68% RDIs NUTRITION INTERVENTIONS & DIAGNOSIS:  
 
- Enteral nutrition: continue - Vitamin and mineral supplement therapy: MVI  
- Collaboration and referral of nutrition care: interdisciplinary rounds Nutrition Diagnosis: Inadequate oral intake related to respiratory status as evidenced by pt NPO. ASSESSMENT:  
 
1/24: Tolerating feeds at goal. Decreased UOP. 
1/23: Tolerating feeds and advancing.  
1/22: S/p cardiac arrest and intubated. Nutritional intake adequate to meet patients estimated nutritional needs:  Yes Tube Feeding: Vital High Protein at 40 mL/hr via OGT Water Flushes: 50 mL q 4 hours Residuals: 0 mL Diet: DIET TUBE FEEDING Food Allergies: NKFA Current Appetite: Not Applicable - NPO Appetite/meal intake prior to admission: Unable to determine at this time Feeding Limitations:  [] Swallowing difficulty    [] Chewing difficulty    [x] Other: respiratory status Current Meal Intake:  
Patient Vitals for the past 100 hrs: 
 % Diet Eaten 01/24/20 0400 0 % 01/24/20 0000 0 % 01/23/20 2000 0 % 01/21/20 1258 15 % 01/21/20 0944 25 % 01/20/20 1343 10 % 01/20/20 0700 25 % BM: 1/23 Skin Integrity: WDL Edema:   [] No     [x] Yes Pertinent Medications: Reviewed: ascorbic acid, atorvastatin, cholecalciferol, famotidine, steroid, lantus (10 units), SSI, MVI, omega 3 DHA, EPA fish oil, 60 mEq K Bicarb, vasopressin, vitamin E Recent Labs  
  01/24/20 
0400 01/23/20 
0545 01/22/20 
0430 01/21/20 2053  140 140 139 K 3.2* 3.2* 3.7 4.0  
 101 101 98* CO2 27 31 33* 34* * 193* 210* 350* BUN 56* 42* 25* 18  
CREA 3.74* 2.69* 1.27 1.09  
CA 7.4* 7.9* 8.1* 8.3*  
MG 2.1 2.1 1.8 2.3 PHOS  --   --   --  6.0* Intake/Output Summary (Last 24 hours) at 1/24/2020 1034 Last data filed at 1/24/2020 0700 Gross per 24 hour Intake 1925.69 ml Output 154 ml Net 1771.69 ml Anthropometrics: 
Ht Readings from Last 1 Encounters:  
01/22/20 4' 10\" (1.473 m) Last 3 Recorded Weights in this Encounter 01/20/20 0459 01/21/20 0548 01/22/20 1407 Weight: 69 kg (152 lb 1.6 oz) 68.5 kg (151 lb 0.2 oz) 68.5 kg (151 lb) Body mass index is 31.56 kg/m². Obese, Class I Weight History:  
Weight Metrics 1/22/2020 6/6/2019 10/9/2018 8/14/2018 8/7/2018 7/18/2018 6/20/2018 Weight 151 lb 157 lb 161 lb 159 lb 159 lb 152 lb 152 lb BMI 31.56 kg/m2 32.81 kg/m2 33.65 kg/m2 33.23 kg/m2 33.23 kg/m2 31.77 kg/m2 31.77 kg/m2 Admitting [facility]: [facility] [I48.91] Pneumonia [J18.9] Hyponatremia [E87.1] CAP (community acquired pneumonia) [J18.9] Pertinent PMHx: dementia, HTN, HLD Education Needs:        [x] None identified  [] Identified - Not appropriate at this time  []  Identified and addressed - refer to education log Learning Limitations:   [] None identified  [x] Identified: altered mentation Cultural, Mormon & ethnic food preferences:  [x] None identified    [] Identified and addressed ESTIMATED NUTRITION NEEDS:  
 
Calories: 759-966 kcal (11-14 kcal/kg) based on  [x] Actual BW 68 kg     [] IBW Protein:  gm (2-2.5 gm/kg) based on  [] Actual BW      [x] IBW 41 kg Fluid: 1 mL/kcal 
  
MONITORING & EVALUATION:  
 
Nutrition Goal(s):  
- Enteral nutrition intake will meet >75% of patient estimated nutritional needs within the next 7 days. Outcome: Met/Continue Monitoring:   [x] Food and nutrient intake   [x] Food and nutrient administration  [x] Comparative standards   [x] Nutrition-focused physical findings   [x] Anthropometric Measurements   [x] Treatment/therapy   [x] Biochemical data, medical tests, and procedures Previous Recommendations (for follow-up assessments only): Implemented Discharge Planning: Nutritional discharge needs unknown at this time. Participated in care planning, discharge planning, & interdisciplinary rounds as appropriate. Mohini Ffoana, FERMÍN Pager: [phone]

## 2020-01-24 NOTE — CDMP QUERY
Pt admitted with Sepsis. Pt noted to have decreasing H&H. If possible, please document in the progress notes and discharge summary if you are evaluating and/or treating any of the following: ? Anemia due to chronic disease please specify ? Dilutional anemia ? Iron deficiency anemia ? Chronic blood loss anemia ? Acute blood loss anemia 
? Other, please specify ? Clinically unable to determine The medical record reflects the following: 
 
   Risk Factors: CKD Clinical Indicators: Hgb 1/16 - 11.9; 1/20 - 10.6; 1/24 - 7.4 Treatment: Daily CBC, monitor for signs of bleeding, transfuse hgb <7 Thank you, [facility]iam [facility], [facility]

## 2020-01-24 NOTE — PROGRESS NOTES
Reviewed chart & IDR's. Patient remains on vent at this time. CM will continue to monitor for transitional needs. LINDA PatelN, RN Pager # [phone] Care Manager

## 2020-01-24 NOTE — ROUTINE PROCESS
Bedside and Verbal shift change report given to Edgar Diamond RN (oncoming nurse) by Deejay Cardozo RN (offgoing nurse). Report included the following information SBAR, Kardex, ED Summary, Intake/Output, MAR, Recent Results and Cardiac Rhythm AFIB.

## 2020-01-24 NOTE — PROGRESS NOTES
[facility] - Progress Note Admit Date: 1/16/2020 Assessment:  
 
Hospital Problems  Date Reviewed: 6/6/2019 Codes Class Noted POA  
 CAP (community acquired pneumonia) ICD-10-CM: J18.9 ICD-9-CM: 898  1/17/2020 Unknown * (Principal) Sepsis due to pneumonia [facility]) ICD-10-CM: J18.9, A41.9 ICD-9-CM: 894, 995.91  1/17/2020 Unknown Hyponatremia ICD-10-CM: E87.1 ICD-9-CM: 276.1  1/16/2020 Unknown A-fib [facility]) ICD-10-CM: I48.91 
ICD-9-CM: 427.31  1/16/2020 Unknown Pneumonia ICD-10-CM: J18.9 ICD-9-CM: 576  1/16/2020 Unknown  
   
  
 
 
  
  
-S/p code blue arrest 1/21/2020. Like primary pulmonary process with aspiration resulting in bradycardic pulseless arrest. Patient is now intubated. -Sepsis likely secondary to Community Acquired Pneumonia along with [address] with EF now 25-30% s/p arrest with large anteroapical wall motion normality consistent with Takotsubo syndrome (EF 60-65% 1/17/2020). -Atrial fibrillation with RVR. New diagnosis. Likely driven by underlying pulmonary process. Afib RVR with hypotension requiring emergent cardioversion 1/22/2020. Candidacy for long term [address] was being discussed prior to arrest, patient was on lovenox full dose which has now been stopped. -H/o syncope/bradycardia while on BB 7/2018. Patient underwent a pharmacologic nuclear stress test, echo and a 30-day event monitor at that time, all of which were normal.  
-Hypertension. Stable. -Hyperlipidemia. On statin. -SL with lupus nephritis. 
-Chronic HAJI. -Chronic pain, fibromyalgia. 
-Anxiety, depression, current delirium? 
-Normal EF 60-65% by echo this admission. 
-Patient is now DNR. 
  
Primary cardiologist Dr. Lul Merino. 
  
 
Plan: PAF with elevated rates noted overnight. Continue amiodarone infusion. Wean off pressors. Start BB when BP will allow. Continue to follow fluid status and renal function closely. Noted decline in Hgb, continue to trend, currently on subcu hep only. Can discuss possible further coronary work up pending recovery and wishes. Continue to address goals of care. Subjective:  
 
intubated Objective:  
  
Patient Vitals for the past 8 hrs: 
 Temp Pulse Resp BP SpO2  
01/24/20 0900  75   98 % 01/24/20 0845  76   97 % 01/24/20 0830  76   97 % 01/24/20 0815  77   97 % 01/24/20 0800 98.9 °F (37.2 °C) 80  111/43 97 % 01/24/20 0745  93   98 % 01/24/20 0740  93 21  98 % 01/24/20 0730  (!) 109   98 % 01/24/20 0715  (!) 115   98 % 01/24/20 0700  (!) 111 20  97 % 01/24/20 0645  (!) 112   97 % 01/24/20 0630  (!) 108 17  97 % 01/24/20 0615  (!) 126   93 % 01/24/20 0600  (!) 110 18 103/58 97 % 01/24/20 0545  (!) 116   97 % 01/24/20 0530  (!) 112 20  100 % 01/24/20 0515  72   99 % 01/24/20 0500  73 19 105/41 99 % 01/24/20 0445  70   98 % 01/24/20 0430  71 17  99 % 01/24/20 0415  68   100 % 01/24/20 0403  71 20  100 % 01/24/20 0400 98.4 °F (36.9 °C) 71  101/48 100 % 01/24/20 0345  66   100 % 01/24/20 0330  65   100 % Patient Vitals for the past 96 hrs: 
 Weight  
01/22/20 1407 68.5 kg (151 lb)  
01/21/20 0548 68.5 kg (151 lb 0.2 oz) Intake/Output Summary (Last 24 hours) at 1/24/2020 1112 Last data filed at 1/24/2020 0700 Gross per 24 hour Intake 1925.69 ml Output 144 ml Net 1781.69 ml Physical Exam: 
General:  no distress Neck:  no JVD Lungs:  clear to auscultation bilaterally Heart:  irregularly irregular rhythm Abdomen:  abdomen is soft without significant tenderness, masses, organomegaly or guarding Extremities:  extremities normal, atraumatic, no cyanosis or edema Data Review:  
 
Labs: Results:  
   
Chemistry Recent Labs  
  01/24/20 
0400 01/23/20 
0545 01/22/20 
0430 01/21/20 2053 * 193* 210* 350*  140 140 139 K 3.2* 3.2* 3.7 4.0  
 101 101 98* CO2 27 31 33* 34* BUN 56* 42* 25* 18  
CREA 3.74* 2.69* 1.27 1.09  
CA 7.4* 7.9* 8.1* 8.3*  
MG 2.1 2.1 1.8 2.3 PHOS  --   --   --  6.0* AGAP 11 8 6 7 BUCR 15 16 20 17 CBC w/Diff Recent Labs  
  01/24/20 
0400 01/23/20 
0545 01/22/20 
0430 WBC 15.9* 26.7* 48.8*  
RBC 2.26* 2.69* 3.01* HGB 7.4* 8.7* 9.7* HCT 21.9* 26.0* 29.6*  314 427* GRANS 91* 92* 90* LYMPH 3* 3* 3* EOS 0 0 0 Cardiac Enzymes No results found for: CPK, CK, CKMMB, CKMB, RCK3, CKMBT, CKNDX, CKND1, MAYITO, TROPT, TROIQ, CARRIE, TROPT, TNIPOC, BNP, BNPP Coagulation No results for input(s): PTP, INR, APTT, INREXT in the last 72 hours. Lipid Panel No results found for: CHOL, CHOLPOCT, CHOLX, CHLST, CHOLV, [id number], HDL, HDLP, LDL, LDLC, DLDLP, [id number], VLDLC, VLDL, TGLX, TRIGL, TRIGP, TGLPOCT, CHHD, CHHDX  
BNP No results found for: BNP, BNPP, XBNPT Liver Enzymes No results for input(s): TP, ALB, TBIL, AP, SGOT, GPT in the last 72 hours. No lab exists for component: DBIL Digoxin Thyroid Studies Lab Results Component Value Date/Time TSH 2.63 01/17/2020 05:30 AM  
    
 
Signed By: PETE Glynn   
 January 24, 2020

## 2020-01-24 NOTE — CONSULTS
Consult Note Consult requested by: Anahi Justin MD 
 
ADMIT DATE: 1/16/2020 CONSULT DATE: January 24, 2020 Admission diagnosis: Sepsis due to pneumonia ([facility] [address] Reason for Nephrology Consultation: JENNIE Assessment and plan: #1 JENNIE, oliguric secondary to acute tubular necrosis in the setting of septic shock versus cardiogenic shock. #2 chronic kidney disease stage III with significant proteinuria #3 acute respiratory failure secondary to aspiration pneumonia/fluid overload #4 cardiomyopathy, new onset with EF of 25%, was 55% about a week back. Precipitating cause unclear cardiology following #5 Atrial fibrillation with RVR #6 metabolic encephalopathy #7Lactic acidosis #8 community-acquired pneumonia #9 hypokalemia Patient appears to be volume overloaded in the setting of pneumonia, acute CHF. Urine output is very poor. Renal parameters worsening Patient is developing ischemic ATN. I have discussed with patient's family in presence of her nurse Danielle and they mentioned  
that patient had expressed wish that she did not want to be on dialysis In any situation, short-term or long-term. In this situation I  
think augmenting the urine output with diuretics would be the primary way to go. Her blood pressure especially her diastolic blood  
pressure is soft and is affecting patient's perfusion pressure. Plan: #1 needs albumin and Lasix challenge, if successful will schedule every 6 hours #2 minimize fluid intake #3 renally dose antibiotics #4 urine studies to be sent #5 follow cardiology recommendations #6 replace K and follow renal parameters , electrolytes Please call with questions, 
 
Drew Pizarro MD [facility] ([place] Cell [phone] Pager: [phone] HPI:  
Patient is a 14-year-old white female with past medical history of dyslipidemia, hypertension who presented to the hospital with cough and shortness of breath.   Patient was diagnosed with community-acquired pneumonia admitted and was treated with antibiotics. She was also found to have A. fib with RVR. Patient suffered a cardiac arrest sometime earlier this week and then was transferred to the ICU. Was also noted that patient had had massive aspiration, chest x-ray with bilateral infiltrates and effusions. Patient was started on antibiotics Vanco and Zosyn. Echocardiogram indicative of 25 to 30% EF with global hypokinesis, stunned myocardium thought to be likely due to Takotsubo's cardiomyopathy versus CAD. Patient developing worsening Shaheed edema anasarca. Patient's urine output appears to have dropped overnight, creatinine has bumped progressively from 1 range on admission to 3 range this morning Past Medical History:  
Diagnosis Date  Dyslipidemia  H/O echocardiogram 07/2018 EF 65%, thickening of the mitral valve leaflets with annular calcification, no regurgitation or stenosis  Hypertension  Syncope 07/2018 No past surgical history on file. Social History Socioeconomic History  Marital status:  Spouse name: Not on file  Number of children: Not on file  Years of education: Not on file  Highest education level: Not on file Occupational History  Not on file Social Needs  Financial resource strain: Not on file  Food insecurity:  
  Worry: Not on file Inability: Not on file  Transportation needs:  
  Medical: Not on file Non-medical: Not on file Tobacco Use  Smoking status: Never Smoker  Smokeless tobacco: Never Used Substance and Sexual Activity  Alcohol use: No  
 Drug use: Not on file  Sexual activity: Not on file Lifestyle  Physical activity:  
  Days per week: Not on file Minutes per session: Not on file  Stress: Not on file Relationships  Social connections:  
  Talks on phone: Not on file Gets together: Not on file Attends Yazidism service: Not on file Active member of club or organization: Not on file Attends meetings of clubs or organizations: Not on file Relationship status: Not on file  Intimate partner violence:  
  Fear of current or ex partner: Not on file Emotionally abused: Not on file Physically abused: Not on file Forced sexual activity: Not on file Other Topics Concern  Not on file Social History Narrative  Not on file No family history on file. Allergies Allergen Reactions  Ace Inhibitors Unknown (comments)  Chlorhexidine Other (comments) Hives and swelling on contact  Ciprofloxacin Unknown (comments)  Hibiclens [Chlorhexidine Gluconate] Rash  Lisinopril Cough  Lyrica [Pregabalin] Unknown (comments)  Macrobid [Nitrofurantoin Monohyd/M-Cryst] Unknown (comments)  Minoxidil Swelling  Neurontin [Gabapentin] Unknown (comments)  Nitrofurantoin Unknown (comments) Nerve pain  Other Medication Hives Most blood pressure meds  Sulfa (Sulfonamide Antibiotics) Unknown (comments)  Tenex [Guanfacine] Unknown (comments)  Zocor [Simvastatin] Unknown (comments) Home Medications:  
 
Medications Prior to Admission Medication Sig  
 albuterol (PROVENTIL HFA, VENTOLIN HFA, PROAIR HFA) 90 mcg/actuation inhaler Take 2 Puffs by inhalation every six (6) hours as needed for Wheezing.  atorvastatin (LIPITOR) 10 mg tablet Take 10 mg by mouth daily.  multivit-min/iron/folic/lutein (CENTRUM SILVER WOMEN PO) Take  by mouth.  fish oil-omega-3 fatty acids 300-500 mg cap Take  by mouth.  vitamin E (AQUA GEMS) 400 unit capsule Take  by mouth daily.  AZO CRANBERRY 249-07-61 mg-mg-million tab Take  by mouth.  ascorbic acid, vitamin C, (VITAMIN C) 500 mg tablet Take  by mouth.  cholecalciferol (VITAMIN D3) 2,000 unit cap capsule Take  by mouth two (2) times a day.   
 amLODIPine (NORVASC) 5 mg tablet TK 1 T PO QD  
  aspirin (ASPIRIN) 325 mg tablet Take 975 mg by mouth three (3) times daily.  carBAMazepine ER (CARBATROL ER) 100 mg capsule Take 100 mg by mouth two (2) times a day.  LORazepam (ATIVAN) 0.5 mg tablet Take  by mouth every eight (8) hours as needed for Anxiety.  montelukast (SINGULAIR) 10 mg tablet Take 10 mg by mouth as needed.  losartan (COZAAR) 100 mg tablet Take 100 mg by mouth daily. Current Inpatient Medications:  
 
Current Facility-Administered Medications Medication Dose Route Frequency  potassium bicarb-citric acid (EFFER-K) tablet 60 mEq  60 mEq Oral NOW  famotidine (PF) (PEPCID) 20 mg in 0.9% sodium chloride 10 mL injection  20 mg IntraVENous Q24H  
 insulin glargine (LANTUS) injection 10 Units  10 Units SubCUTAneous DAILY  hydrocortisone Sod Succ (PF) (SOLU-CORTEF) injection 50 mg  50 mg IntraVENous Q12H  
 NOREPINephrine (LEVOPHED) 8 mg in 5% dextrose 250mL (32 mcg/mL) infusion  0.5-30 mcg/min IntraVENous TITRATE  vasopressin (VASOSTRICT) 20 Units in 0.9% sodium chloride 100 mL infusion  0-0.04 Units/min IntraVENous TITRATE  piperacillin-tazobactam (ZOSYN) 2.25 g in 0.9% sodium chloride (MBP/ADV) 50 mL MBP  2.25 g IntraVENous Q6H  
 insulin lispro (HUMALOG) injection   SubCUTAneous Q6H  
 glucose chewable tablet 16 g  4 Tab Oral PRN  
 glucagon (GLUCAGEN) injection 1 mg  1 mg IntraMUSCular PRN  
 dextrose (D50W) injection syrg 12.5-25 g  25-50 mL IntraVENous PRN  
 amiodarone (NEXTERONE) 360 mg in dextrose 200 mL (1.8 mg/mL) infusion  1 mg/min IntraVENous TITRATE  sodium chloride 3% hypertonic nebulizer soln  4 mL Nebulization QID RT  
 albuterol (PROVENTIL VENTOLIN) nebulizer solution 2.5 mg  2.5 mg Nebulization QID RT  
 multivitamin (MULTI-DELYN, WELLESSE) oral liquid 30 mL  30 mL Per NG tube DAILY  heparin (porcine) injection 5,000 Units  5,000 Units SubCUTAneous Q8H  
 VANCOMYCIN INFORMATION NOTE   Other Rx Dosing/Monitoring  chlorhexidine (PERIDEX) 0.12 % mouthwash 10 mL  10 mL Oral Q12H  
 midazolam (VERSED) injection 1-2 mg  1-2 mg IntraVENous Q10MIN PRN  
 fentaNYL citrate (PF) injection  mcg   mcg IntraVENous Q30MIN PRN  
 fentaNYL (PF) 900 mcg/30 ml infusion soln  0-200 mcg/hr IntraVENous TITRATE  midazolam in normal saline (VERSED) 2 mg/mL infusion  1-10 mg/hr IntraVENous TITRATE  albuterol-ipratropium (DUO-NEB) 2.5 MG-0.5 MG/3 ML  3 mL Nebulization Q4H PRN  
 guaiFENesin (ROBITUSSIN) 100 mg/5 mL oral liquid 400 mg  400 mg Oral Q4H PRN  
 vitamin E acetate capsule 400 Units  400 Units Oral DAILY  ascorbic acid (vitamin C) (VITAMIN C) tablet 500 mg  500 mg Oral DAILY  atorvastatin (LIPITOR) tablet 10 mg  10 mg Oral DAILY  carBAMazepine XR (TEGretol XR) tablet 100 mg  100 mg Oral Q12H  cholecalciferol (VITAMIN D3) (400 Units /10 mcg) tablet 2.5 Tab  1,000 Units Oral DAILY  omega 3-DHA-EPA-fish oil 1,000 mg (120 mg-180 mg) capsule 1 Cap  1 Cap Oral DAILY  montelukast (SINGULAIR) tablet 10 mg  10 mg Oral PRN  
 acetaminophen (TYLENOL) tablet 650 mg  650 mg Oral Q4H PRN  
 benzonatate (TESSALON) capsule 100 mg  100 mg Oral TID PRN  
 sodium chloride (NS) flush 5-10 mL  5-10 mL IntraVENous PRN Review of Systems:  
Unable to be obtained as patient is intubated/sedated Physical Assessment:  
 
Vitals:  
 01/24/20 0830 01/24/20 0845 01/24/20 0900 01/24/20 1213 BP:      
Pulse: 76 76 75 64 Resp:    16 Temp:      
SpO2: 97% 97% 98% 99% Weight:      
Height:      
 
Last 3 Recorded Weights in this Encounter 01/20/20 0459 01/21/20 0548 01/22/20 1407 Weight: 69 kg (152 lb 1.6 oz) 68.5 kg (151 lb 0.2 oz) 68.5 kg (151 lb) Admission weight: Weight: 63.5 kg (140 lb) (01/16/20 1250) Intake/Output Summary (Last 24 hours) at 1/24/2020 1248 Last data filed at 1/24/2020 0700 Gross per 24 hour Intake 1525.69 ml Output 129 ml Net 1396.69 ml Intubated and sedated HEENT: Head is normocephalic and atraumatic. Lungs: toyin rales Cardiovascular system: S1, S2, regular rate and rhythm. Abdomen: soft, non tender, non distended. BS Extremities: 1+ext edema Integumentary: skin is grossly intact. Data Review: 
 
Labs: Results:  
   
Chemistry Recent Labs  
  01/24/20 
0400 01/23/20 
0545 01/22/20 
0430 01/21/20 2053 * 193* 210* 350*  140 140 139  
K 3.2* 3.2* 3.7 4.0  
 101 101 98* CO2 27 31 33* 34* BUN 56* 42* 25* 18  
CREA 3.74* 2.69* 1.27 1.09  
CA 7.4* 7.9* 8.1* 8.3* AGAP 11 8 6 7 BUCR 15 16 20 17 PHOS  --   --   --  6.0*  
  
  
CBC w/Diff Recent Labs  
  01/24/20 
0400 01/23/20 
0545 01/22/20 
0430 WBC 15.9* 26.7* 48.8*  
RBC 2.26* 2.69* 3.01* HGB 7.4* 8.7* 9.7* HCT 21.9* 26.0* 29.6*  314 427* GRANS 91* 92* 90* LYMPH 3* 3* 3* EOS 0 0 0 Iron/Ferritin No results for input(s): IRON in the last 72 hours. No lab exists for component: TIBCCALC  
PTH/VIT D No results for input(s): PTH in the last 72 hours. No lab exists for component: VITD Shaylee Wen MD 
1/24/2020 
12:48 PM 
 
 
January 24, 2020

## 2020-01-24 NOTE — PALLIATIVE CARE
PALLIATIVE MEDICINE NOTE This Louis Garcia called Pt's daughter, Phoenix Pichardo, to schedule a family  Meeting. Briefly introduced our team and described our role in Pt's care. Phoenix Pichardo and her brother, Keyona Kulkarni, are available to come in on Monday, 1/27, at 1500. Provided them with contact info for this team.  Phoenix Pichardo thanked this author for the call. Thank you for the consult and the opportunity to assist in Ms. Florian Exon care. We will cont to follow to provide support to Pt and family. Liz Garcia, [facility]

## 2020-01-24 NOTE — DIABETES MGMT
GLYCEMIC CONTROL AND NUTRITION Assessment/Recommendations: 
Lab glucose this am 177 mg/dl Noted steroid dose decreased Continue lantus and corrective insulin coverage as ordered Already receiving very insulin resistant coverage Will continue inpatient monitoring Most recent blood glucose values: 
Results for Con Wilburn (MRN [id number]) as of 1/24/2020 13:38 Ref. Range 1/23/2020 11:28 1/23/2020 17:05 1/23/2020 23:57 1/24/2020 05:41 1/24/2020 12:23 GLUCOSE,FAST - POC Latest Ref Range: 70 - 110 mg/dL 258 (H) 193 (H) 199 (H) 213 (H) 193 (H) Current A1C of none % is equivalent to average blood glucose of ____mg/dl over the past 2-3 months. Current hospital diabetes medications:  
Lantus 10 units daily Lispro corrective insulin coverage every 6 hours Home diabetes medications: 
None noted Diet:   
NPO. Tube feeding Education:  ____Refer to Diabetes Education Record __x_Education not indicated at this time Lisette Pierce RN CDE Ext [phone]

## 2020-01-24 NOTE — PROGRESS NOTES
attended the interdisciplinary rounds for Dara Vasquez, who is a 80 y.o.,female. Patients Primary Language is: [place]. According to the patients EMR Zoroastrian Affiliation is: [place]. The reason the Patient came to the hospital is:  
Patient Active Problem List  
 Diagnosis Date Noted  CAP (community acquired pneumonia) 01/17/2020  Sepsis due to pneumonia ([facility] [address] 01/17/2020  Hyponatremia 01/16/2020  [facility]  Pneumonia 01/16/2020  Dyslipidemia 08/07/2018  
 HTN (hypertension) 07/18/2018  Trigeminal neuralgia 07/18/2018  SLE (systemic lupus erythematosus) ([facility] [address] 07/18/2018  Syncope 07/17/2018 Plan: 
Chaplains will continue to follow and will provide pastoral care on an as needed/requested basis.  recommends bedside caregivers page  on duty if patient shows signs of acute spiritual or emotional distress. [address] [facility] Board Certified [facility]  
[phone]

## 2020-01-25 NOTE — PROGRESS NOTES
[address] [place] [address] [facility]. Pulmonary, Critical Care, and Sleep Medicine Name: Tayo Joshua MRN: [id number] : 1933 Hospital: [address] [place] [address] Dr Date: 2020  Admission Date: 2020 Chart and notes reviewed. Data reviewed. I have evaluated all findings. [x]I have reviewed the flowsheet and previous days notes. [x]The patient is unable to give any meaningful history or review of systems because the patient is: 
[x]Intubated [x]Sedated  
[]Unresponsive [x]The patient is critically ill on     
[x]Mechanical ventilation [x]Pressors []BiPAP [] Patient is a 80 y.o. female with a history of baseline dementia, HTN, HLD, SLE, trigeminal neuralgia and was admitted on 20 in afib with RVR with CAP and hyponatremia. Prior to admission patient had a cough for past 2 weeks. During hospitalization the patient was being treated for CAP with improving SIRS criteria, Bcx negative, started on rocephin and azithromycin in ED. Patient during hospital stay pt had been hemodynamically stable on antihypertensives, during the evening of 20pt started to require more O2 and was placed on NC, then around 18:48 pt went into cardiac arrest, code blue was called, and RRT achieved ROSC twice with 3 rounds of epi, final ROSC achieved at 1916. Unclear from treatment team as to why the patient had a cardiac arrest but presumably because she may have developed acute respiratory failure from possible aspiration. Transferred to ICU for higher level care.  
  
20 Patient comfortable this morning on exam.  no acute events overnight. Team Spoke with patients family yesterday, were questioning their decision to resuscitate and intubate the patient in the first place. ...stated that she would have never wanted to be sustained by machines. Mentation: Sedated, awakens to voice,not following commands todaycommands Respiratory/ Secretions: Ventilator, thick tan secretions Hemodynamics: HD stable Urine output: very minimal, 90ml overnight ROS:Review of systems not obtained due to patient factors. Events and notes from last 24 hours reviewed. Care plan discussed on multidisciplinary rounds. Patient Active Problem List  
Diagnosis Code  Syncope R55  
 HTN (hypertension) I10  
 Trigeminal neuralgia G50.0  SLE (systemic lupus erythematosus) ([facility]) M32.9  Dyslipidemia E78.5  Hyponatremia E87.1  A-fib ([facility]) I48.91  
 Pneumonia J18.9  
 CAP (community acquired pneumonia) J18.9  Sepsis due to pneumonia ([facility]) J18.9, A41.9 Vital Signs: 
Visit Vitals /53 Pulse 60 Temp 96.1 °F (35.6 °C) Resp 18 Ht 4' 10\" (1.473 m) Wt 72.5 kg (159 lb 13.3 oz) SpO2 94% Breastfeeding No  
BMI 33.41 kg/m² O2 Device: Endotracheal tube, Ventilator O2 Flow Rate (L/min): 4 l/min Temp (24hrs), Av.2 °F (36.2 °C), Min:96.1 °F (35.6 °C), Max:97.9 °F (36.6 °C) Intake/Output:  
Last shift:       07 -  190 In: 394.4 [I.V.:64.4] Out: 100 [Urine:100] Last 3 shifts:  190 -  0700 In: 3706.7 [I.V.:2236.7] Out: 474 [Urine:474] Intake/Output Summary (Last 24 hours) at 2020 1210 Last data filed at 2020 1100 Gross per 24 hour Intake 2871.13 ml Output 485 ml Net 2386.13 ml Ventilator Settings: 
Ventilator Mode: Assist control Respiratory Rate Back-Up Rate: 18 Insp Time (sec): 1 sec I:E Ratio: 1:3.5 Ventilator Volumes Vt Set (ml): 300 ml Vt Exhaled (Machine Breath) (ml): 329 ml Vt Spont (ml): 318 ml Ve Observed (l/min): 6.3 l/min Ventilator Pressures PIP Observed (cm H2O): 25 cm H2O Plateau Pressure (cm H2O): 20 cm H2O 
MAP (cm H2O): 12 PEEP/VENT (cm H2O): 5 cm H20 Current Facility-Administered Medications Medication Dose Route Frequency  furosemide (LASIX) injection 20 mg  20 mg IntraVENous Q6H  
 famotidine (PF) (PEPCID) 20 mg in 0.9% sodium chloride 10 mL injection 20 mg IntraVENous Q24H  
 insulin glargine (LANTUS) injection 10 Units  10 Units SubCUTAneous DAILY  NOREPINephrine (LEVOPHED) 8 mg in 5% dextrose 250mL (32 mcg/mL) infusion  0.5-30 mcg/min IntraVENous TITRATE  vasopressin (VASOSTRICT) 20 Units in 0.9% sodium chloride 100 mL infusion  0-0.04 Units/min IntraVENous TITRATE  piperacillin-tazobactam (ZOSYN) 2.25 g in 0.9% sodium chloride (MBP/ADV) 50 mL MBP  2.25 g IntraVENous Q6H  
 insulin lispro (HUMALOG) injection   SubCUTAneous Q6H  
 sodium chloride 3% hypertonic nebulizer soln  4 mL Nebulization QID RT  
 albuterol (PROVENTIL VENTOLIN) nebulizer solution 2.5 mg  2.5 mg Nebulization QID RT  
 multivitamin (MULTI-DELYN, WELLESSE) oral liquid 30 mL  30 mL Per NG tube DAILY  heparin (porcine) injection 5,000 Units  5,000 Units SubCUTAneous Q8H  
 chlorhexidine (PERIDEX) 0.12 % mouthwash 10 mL  10 mL Oral Q12H  
 fentaNYL (PF) 900 mcg/30 ml infusion soln  0-200 mcg/hr IntraVENous TITRATE  midazolam in normal saline (VERSED) 2 mg/mL infusion  1-10 mg/hr IntraVENous TITRATE  vitamin E acetate capsule 400 Units  400 Units Oral DAILY  ascorbic acid (vitamin C) (VITAMIN C) tablet 500 mg  500 mg Oral DAILY  atorvastatin (LIPITOR) tablet 10 mg  10 mg Oral DAILY  carBAMazepine XR (TEGretol XR) tablet 100 mg  100 mg Oral Q12H  cholecalciferol (VITAMIN D3) (400 Units /10 mcg) tablet 2.5 Tab  1,000 Units Oral DAILY  omega 3-DHA-EPA-fish oil 1,000 mg (120 mg-180 mg) capsule 1 Cap  1 Cap Oral DAILY Telemetry: Sinus rhythm Physical Exam:  
 General: sedated on ventilator, min responsive today HEENT: ED, mucous membranes moist 
 Lungs: symmetric air entry but diffusely coarse breath sounds Heart: Regular rate and rhythm,  S1S2 present Abdomen:  tenderness, masses, organomegaly or guarding\"} Extremity: diffuse anasarca, 2+ edema in bilateral UE, 1+ in bilateral LE. LLE larger than RLE. Neuro: agitated, follows commands Skin: Skin color, texture, turgor normal. No rashes or lesions DATA: 
MAR reviewed and pertinent medications noted or modified as needed Labs: 
Recent Labs  
  01/25/20 
0530 01/24/20 
0400 01/23/20 
0545 WBC 12.0 15.9* 26.7* HGB 6.5* 7.4* 8.7* HCT 19.3* 21.9* 26.0*  
 209 314 Recent Labs  
  01/25/20 
0530 01/24/20 
1955 01/24/20 
0400 01/23/20 
0545  136 140 140  
K 3.4* 4.0 3.2* 3.2*  
 100 102 101 CO2 24 27 27 31 * 157* 177* 193* BUN 66* 63* 56* 42* CREA 4.51* 4.25* 3.74* 2.69* CA 7.3* 7.2* 7.4* 7.9*  
MG 2.0  --  2.1 2.1 PHOS  --  4.5  --   --   
ALB  --  1.9*  --   -- No results for input(s): PH, PCO2, PO2, HCO3, FIO2 in the last 72 hours. Recent Labs  
  01/25/20 
0402 01/24/20 
0414 01/23/20 
0423 FIO2I 40 40 45 HCO3I 28.1* 29.0* 32.9*  
PCO2I 50.1* 38.1 39.7 PHI 7.357 7.489* 7.527* PO2I 70* 112* 95 Imaging: 
[x]   I have personally reviewed the patients radiographs and reports XR Results (most recent): 
Results from Hospital Encounter encounter on 01/16/20 XR CHEST PORT Narrative CHEST PORTABLE 0258 hours CPT CODE: 04124 COMPARISON: 24 January. INDICATIONS: Intubated. FINDINGS:  
 
Portable single view chest demonstrates: 
 
Lungs: Hypoventilated with diffuse pulmonary infiltrates with basilar confluence Cardiac Silhouette And Mediastinal Contours: There is enlargement of the cardiac 
and mediastinal contours similar to previous. . 
 
Pleural Spaces: Pleural fluid layers posteriorly bilaterally. Bones And Soft Tissues: Unremarkable for age. Endotracheal tube and nasogastric tube are in radiographically satisfactory 
positions. Impression IMPRESSION: 
 
Radiographically stable and unchanged cardiopulmonary status. [facility] CT Results (most recent): 
Results from Hospital Encounter encounter on 07/17/18 CT HEAD WO CONT Narrative CT HEAD WITHOUT IV CONTRAST INDICATION: Syncope/fainting. COMPARISON: CT head 2/27/2018. TECHNIQUE: Serial axial CT images were obtained from the skull vertex to foramen 
magnum without IV contrast. All CT scans at this facility are performed using 
dose optimization technique as appropriate to a performed exam, to include 
automated exposure control, adjustment of the mA and/or kV according to 
patient's size (including appropriate matching for site-specific examinations), 
or use of iterative reconstruction technique. FINDINGS: 
Visualized paranasal sinuses are free from significant mucosal disease. Ethelene Gauss Buck-white matter differentiation appears preserved. Moderate 
periventricular/cerebral white matter hypoattenuation, grossly unchanged. .No 
evidence for acute intracranial hemorrhage, acute infarct, or mass lesion. No 
evidence for hydrocephalus. . The calvarium appears intact. Impression IMPRESSION: 
No evidence for acute intracranial process. Ethelene Gauss Moderate white matter disease, presumed chronic ischemic. A preliminary reading was placed in PACS by Dr. Veronica June at [phone] hours 7/18/2018. IMPRESSION:  
· Cardiac arrest x3 - Code blue on the floor, most likely due to respiratory failure or possible aspiration. · Acute hypoxic respiratory failure requiring intubation - now with aspiration on top of severe pneumonia. · Severe sepsis with septic shock- likely 2/2 pneumonia · Atrial fibrillation with RVR- cardioverted 1/22 and has been in NSR since · Lactic acidosis- resolved · Hyperglycemia - No hx of DM · Acute versus acute on chronic encephalopathy- toxic/metabolic vs anoxic. Despite long arrest, patient is awakening, alert, and following commands · Cardiomyopathy, new onset: TTE post cardiac arrest shows LVEF of 25%, previously 1 week ago was 55%. Etiology likely due to cardiac arrest, unclear if this precipitated cardiac arrest 
· JENNIE- likely ischemic ATN- Cr continues to climb, UOP continues to decrease · HTN  
· HLD 
 · Anemia- no active bleeding · Hyponatremia- resolved · Electrolytes- hypokalemia, hypomagnesemia · Dementia/delerium · Trigeminal Neuralgia - on Carbamezapine · UTI no initial cultures sent - unlikely source of sepsis from initial UA.   
   
  
 
Patient Active Problem List  
Diagnosis Code  Syncope R55  
 HTN (hypertension) I10  
 Trigeminal neuralgia G50.0  SLE (systemic lupus erythematosus) ([facility]) M32.9  Dyslipidemia E78.5  Hyponatremia E87.1  A-fib ([facility]) I48.91  
 Pneumonia J18.9  
 CAP (community acquired pneumonia) J18.9  Sepsis due to pneumonia ([facility]) J18.9, A41.9 RECOMMENDATIONS:  
· Resp: Titrate FiO2/ supp O2 for SpO2 >90%; Routine Abgs, PRN and scheduled nebs. Wean vent as tolerated. ProCal 4.75 
· I/D: WBCs downtrending today. Afebrile overnight. Continue Vanc/Zosyn. Cultures negative to date. CXR unchanged with bilateral opacities and moderate bilateral pleural effusions · Hem/Onc: Daily CBC. Monitor for signs of active bleeding. Transfuse for hbg <7. 
· CVS: NSR on amio gtt. Titrate vasopressors, aim MAP >65mmHg, will try to wean. Cardiology following- recommend continuing amio drip, start BB when BP can tolerate · Metabolic: Daily BMP; monitor e-lytes; replace PRN. Continue SSI · Renal: Trend Renal indices Cr continues to increase, minimal urine output. Nephrology consulted, Albumin/lasix challenge . Patient's family is very clear that patient would not want dialysis · Endocrine: POC Glucose q6; TSH WNL · GI: SUP Protonix daily, TF with goal of 40/hr- advance as tolerated · Musc/Skin: No acute issues, wound care as needed · Neuro: Fentanyl  and versed gtt for sedation for RAAS 0 to -1. Continue Carbamezapine for Trigeminal neuralgia. Monitor neuro status closely, patient able to follow commands today. · Code Status: DNR - See hospitalist note - After ROSC was achieved during code blue, Family wanted emergent intubation allowed.   According to  Jason's note on 1/21/20 she discussed with the family and they changed her code status from FULL to DNR. Palliative care consulted to discuss goals of care with family per request.   
 
Best practice : 
 
Glycemic control IHI ICU bundles:  
 Central Line Bundle Followed , Harrell Bundle Followed and Vent Bundle Followed, Vent Day 4 [facility] Vent patients- VAP bundle, aim to keep peak plateau pressure [id number] H2O Sress ulcer prophylaxis. DVT prophylaxis. Need for Lines, harrell assessed. Restraints need. Palliative care evaluation. High complexity decision making was performed during this consultation and evaluation. [x]       Pt is at high risk for further organ failure and dysfunction.   
 
 
Charlotte Palomino PA-C,

## 2020-01-25 NOTE — PROGRESS NOTES
Problem: Pain Goal: *Control of Pain Outcome: Progressing Towards Goal 
Goal: *PALLIATIVE CARE:  Alleviation of Pain Outcome: Progressing Towards Goal 
  
Problem: Falls - Risk of 
Goal: *Absence of Falls Description Document Grecia Sahu Fall Risk and appropriate interventions in the flowsheet. Outcome: Progressing Towards Goal 
Note: Fall Risk Interventions: 
Mobility Interventions: Assess mobility with egress test, Bed/chair exit alarm, Communicate number of staff needed for ambulation/transfer, PT Consult for mobility concerns, PT Consult for assist device competence, Strengthening exercises (ROM-active/passive) Mentation Interventions: Adequate sleep, hydration, pain control, Bed/chair exit alarm, Door open when patient unattended, Evaluate medications/consider consulting pharmacy, More frequent rounding, Reorient patient, Room close to nurse's station, Toileting rounds, Update white board Medication Interventions: Evaluate medications/consider consulting pharmacy, Bed/chair exit alarm Elimination Interventions: Bed/chair exit alarm, Call light in reach, Toileting schedule/hourly rounds Problem: Pressure Injury - Risk of 
Goal: *Prevention of pressure injury Description Document Tristen Scale and appropriate interventions in the flowsheet. Outcome: Progressing Towards Goal 
Note: Pressure Injury Interventions: 
Sensory Interventions: Assess changes in LOC, Keep linens dry and wrinkle-free, Minimize linen layers, Monitor skin under medical devices, Pad between skin to skin, Turn and reposition approx. every two hours (pillows and wedges if needed) Moisture Interventions: Absorbent underpads, Apply protective barrier, creams and emollients, Internal/External urinary devices, Minimize layers Activity Interventions: Assess need for specialty bed, PT/OT evaluation Mobility Interventions: Turn and reposition approx. every two hours(pillow and wedges), PT/OT evaluation, Float heels Nutrition Interventions: Document food/fluid/supplement intake, Discuss nutritional consult with provider Friction and Shear Interventions: Apply protective barrier, creams and emollients, Foam dressings/transparent film/skin sealants, Lift sheet, Lift team/patient mobility team, Minimize layers Problem: Pneumonia: Day 3 Goal: Activity/Safety Outcome: Progressing Towards Goal 
Goal: Consults, if ordered Outcome: Progressing Towards Goal 
Goal: Diagnostic Test/Procedures Outcome: Progressing Towards Goal 
Goal: Nutrition/Diet Outcome: Progressing Towards Goal 
Goal: Discharge Planning Outcome: Progressing Towards Goal 
Goal: Medications Outcome: Progressing Towards Goal 
Goal: Respiratory Outcome: Progressing Towards Goal 
Goal: Treatments/Interventions/Procedures Outcome: Progressing Towards Goal 
Goal: Psychosocial 
Outcome: Progressing Towards Goal 
Goal: *Oxygen saturation within defined limits Outcome: Progressing Towards Goal 
Goal: *Hemodynamically stable Outcome: Progressing Towards Goal 
Goal: *Demonstrates progressive activity Outcome: Progressing Towards Goal 
Goal: *Tolerating diet Outcome: Progressing Towards Goal 
Goal: *Describes available resources and support systems Outcome: Progressing Towards Goal 
Goal: *Optimal pain control at patient's stated goal 
Outcome: Progressing Towards Goal 
  
Problem: Afib Pathway: Day 1 Goal: Consults, if ordered Outcome: Progressing Towards Goal 
Goal: Diagnostic Test/Procedures Outcome: Progressing Towards Goal 
Goal: Medications Outcome: Progressing Towards Goal 
Goal: Treatments/Interventions/Procedures Outcome: Progressing Towards Goal 
Goal: Psychosocial 
Outcome: Progressing Towards Goal 
  
Problem: Ventilator Management Goal: *Adequate oxygenation and ventilation Outcome: Progressing Towards Goal 
Goal: *Patient maintains clear airway/free of aspiration Outcome: Progressing Towards Goal 
 Goal: *Absence of infection signs and symptoms Outcome: Progressing Towards Goal 
Goal: *Normal spontaneous ventilation Outcome: Progressing Towards Goal 
  
Problem: Non-Violent Restraints Goal: *Removal from restraints as soon as assessed to be safe Outcome: Progressing Towards Goal 
Goal: *No harm/injury to patient while restraints in use Outcome: Progressing Towards Goal 
Goal: *Patient's dignity will be maintained Outcome: Progressing Towards Goal 
Goal: *Patient Specific Goal (EDIT GOAL, INSERT TEXT) Outcome: Progressing Towards Goal 
Goal: Non-violent Restaints:Standard Interventions Outcome: Progressing Towards Goal 
Goal: Non-violent Restraints:Patient Interventions Outcome: Progressing Towards Goal 
Goal: Patient/Family Education Outcome: Progressing Towards Goal

## 2020-01-25 NOTE — PROGRESS NOTES
Problem: Falls - Risk of 
Goal: *Absence of Falls Description Document Rosalie Gómez Fall Risk and appropriate interventions in the flowsheet. Outcome: Progressing Towards Goal 
Note: Fall Risk Interventions: 
Mobility Interventions: Assess mobility with egress test, Bed/chair exit alarm, Communicate number of staff needed for ambulation/transfer, PT Consult for mobility concerns, PT Consult for assist device competence, Strengthening exercises (ROM-active/passive) Mentation Interventions: Adequate sleep, hydration, pain control, Bed/chair exit alarm, Door open when patient unattended, Evaluate medications/consider consulting pharmacy, More frequent rounding, Reorient patient, Room close to nurse's station, Toileting rounds, Update white board Medication Interventions: Evaluate medications/consider consulting pharmacy, Bed/chair exit alarm Elimination Interventions: Bed/chair exit alarm, Call light in reach, Toileting schedule/hourly rounds Problem: Pressure Injury - Risk of 
Goal: *Prevention of pressure injury Description Document Tristen Scale and appropriate interventions in the flowsheet. Outcome: Progressing Towards Goal 
Note: Pressure Injury Interventions: 
Sensory Interventions: Assess changes in LOC, Keep linens dry and wrinkle-free, Minimize linen layers, Monitor skin under medical devices, Pad between skin to skin, Turn and reposition approx. every two hours (pillows and wedges if needed) Moisture Interventions: Absorbent underpads, Apply protective barrier, creams and emollients, Internal/External urinary devices, Minimize layers Activity Interventions: Assess need for specialty bed, PT/OT evaluation Mobility Interventions: Turn and reposition approx. every two hours(pillow and wedges), PT/OT evaluation, Float heels Nutrition Interventions: Document food/fluid/supplement intake, Discuss nutritional consult with provider Friction and Shear Interventions: Apply protective barrier, creams and emollients, Foam dressings/transparent film/skin sealants, Lift sheet, Lift team/patient mobility team, Minimize layers Problem: Afib Pathway: Day 1 Goal: Consults, if ordered Outcome: Progressing Towards Goal 
Goal: Diagnostic Test/Procedures Outcome: Progressing Towards Goal 
Goal: Medications Outcome: Progressing Towards Goal 
Goal: Treatments/Interventions/Procedures Outcome: Progressing Towards Goal 
Goal: Psychosocial 
Outcome: Progressing Towards Goal 
  
Problem: Ventilator Management Goal: *Adequate oxygenation and ventilation Outcome: Progressing Towards Goal 
Goal: *Patient maintains clear airway/free of aspiration Outcome: Progressing Towards Goal 
Goal: *Absence of infection signs and symptoms Outcome: Progressing Towards Goal 
Goal: *Normal spontaneous ventilation Outcome: Progressing Towards Goal 
  
Problem: Non-Violent Restraints Goal: *Removal from restraints as soon as assessed to be safe Outcome: Progressing Towards Goal 
Goal: *No harm/injury to patient while restraints in use Outcome: Progressing Towards Goal 
Goal: *Patient's dignity will be maintained Outcome: Progressing Towards Goal 
Goal: *Patient Specific Goal (EDIT GOAL, INSERT TEXT) Outcome: Progressing Towards Goal 
Goal: Non-violent Restaints:Standard Interventions Outcome: Progressing Towards Goal 
Goal: Non-violent Restraints:Patient Interventions Outcome: Progressing Towards Goal 
Goal: Patient/Family Education Outcome: Progressing Towards Goal 
  
Problem: Nutrition Deficit Goal: *Optimize nutritional status Outcome: Progressing Towards Goal

## 2020-01-25 NOTE — ROUTINE PROCESS
Spoke to Dr Chetan Matthews regarding possible albumin order as previous orders are completed. Order rec'd.

## 2020-01-25 NOTE — PROGRESS NOTES
[facility]  -  Progress Note Patient: Jodie Dance MRN: [id number]  SSN: [id number] YOB: 1933  Age: 80 y.o. Sex: female Admit Date: 1/16/2020 Assessment:  
 
Hospital Problems  Date Reviewed: 6/6/2019 Codes Class Noted POA  
 CAP (community acquired pneumonia) ICD-10-CM: J18.9 ICD-9-CM: 009  1/17/2020 Unknown * (Principal) Sepsis due to pneumonia [facility]) ICD-10-CM: J18.9, A41.9 ICD-9-CM: 087, 995.91  1/17/2020 Unknown Hyponatremia ICD-10-CM: E87.1 ICD-9-CM: 276.1  1/16/2020 Unknown A-fib [facility]) ICD-10-CM: I48.91 
ICD-9-CM: 427.31  1/16/2020 Unknown Pneumonia ICD-10-CM: J18.9 ICD-9-CM: 033  1/16/2020 Unknown  
   
  
 
-S/p code blue arrest 1/21/2020. Like primary pulmonary process with aspiration resulting in bradycardic pulseless arrest. Patient is now intubated. -Sepsis likely secondary to Community Acquired Pneumonia along with [address] with EF now 25-30% s/p arrest with large anteroapical wall motion normality consistent with Takotsubo syndrome (EF 60-65% 1/17/2020). -Atrial fibrillation with RVR. New diagnosis. Likely driven by underlying pulmonary process. Afib RVR with hypotension requiring emergent cardioversion 1/22/2020. Candidacy for long term [address] was being discussed prior to arrest, patient was on lovenox full dose which has now been stopped. -H/o syncope/bradycardia while on BB 7/2018. Patient underwent a pharmacologic nuclear stress test, echo and a 30-day event monitor at that time, all of which were normal.  
-Hypertension. Stable. -Hyperlipidemia. On statin. -SL with lupus nephritis. 
-Chronic HAJI. -Chronic pain, fibromyalgia. 
-Anxiety, depression, current delirium? 
-Normal EF 60-65% by echo this admission. 
-Patient is now DNR. 
  
Primary cardiologist Dr. Alban Sanchez. Plan: No significant changes noted and would continue with current CV regimen Continue with supportive care. Subjective:  
 
Remains intubated and sedated. No changes overnight. Objective:  
  
Patient Vitals for the past 8 hrs: 
 Temp Pulse Resp BP SpO2  
01/25/20 0827  (!) 53 18  97 % 01/25/20 0800 96.1 °F (35.6 °C)      
01/25/20 0630  (!) 53 18  97 % 01/25/20 0600  (!) 54  109/47 96 % 01/25/20 0530  (!) 59 16  93 % 01/25/20 0500  (!) 58 16 103/44 95 % 01/25/20 0430  62   96 % 01/25/20 0400 97.5 °F (36.4 °C) 67 16 126/51 96 % 01/25/20 0330  64 16  94 % 01/25/20 0300  (!) 58  109/48 96 % 01/25/20 0230  60   96 % 01/25/20 0200  64 16 111/48 96 % Patient Vitals for the past 96 hrs: 
 Weight  
01/25/20 0223 72.5 kg (159 lb 13.3 oz) 01/22/20 1407 68.5 kg (151 lb) Intake/Output Summary (Last 24 hours) at 1/25/2020 1384 Last data filed at 1/25/2020 0700 Gross per 24 hour Intake 2476.73 ml Output 385 ml Net 2091.73 ml Physical Exam: 
General:  appears stated age, intubated ,sedated Neck:  nontender, no JVD Lungs:  clear to auscultation bilaterally Heart:  irregularly irregular rhythm Abdomen:  abdomen is soft without significant tenderness, masses, organomegaly or guarding Extremities:  extremities normal, atraumatic, no cyanosis or edema Data Review:  
 
Labs: Results:  
   
Chemistry Recent Labs  
  01/25/20 
0530 01/24/20 
1955 01/24/20 
0400 01/23/20 
0545 * 157* 177* 193*  136 140 140  
K 3.4* 4.0 3.2* 3.2*  
 100 102 101 CO2 24 27 27 31 BUN 66* 63* 56* 42* CREA 4.51* 4.25* 3.74* 2.69* CA 7.3* 7.2* 7.4* 7.9*  
MG 2.0  --  2.1 2.1 PHOS  --  4.5  --   --   
AGAP 12 9 11 8 BUCR 15 15 15 16 ALB  --  1.9*  --   --   
  
CBC w/Diff Recent Labs  
  01/25/20 
0530 01/24/20 
0400 01/23/20 
0545 WBC 12.0 15.9* 26.7*  
RBC 1.99* 2.26* 2.69* HGB 6.5* 7.4* 8.7* HCT 19.3* 21.9* 26.0*  
 209 314 GRANS 91* 91* 92* LYMPH 5* 3* 3* EOS 0 0 0 Cardiac Enzymes No results found for: CPK, CK, CKMMB, CKMB, RCK3, CKMBT, CKNDX, CKND1, MAYITO, TROPT, TROIQ, CARRIE, TROPT, TNIPOC, BNP, BNPP Coagulation No results for input(s): PTP, INR, APTT, INREXT in the last 72 hours. Lipid Panel No results found for: CHOL, CHOLPOCT, CHOLX, CHLST, CHOLV, 170453, HDL, HDLP, LDL, LDLC, DLDLP, 158524, VLDLC, VLDL, TGLX, TRIGL, TRIGP, TGLPOCT, CHHD, CHHDX  
BNP No results found for: BNP, BNPP, XBNPT Liver Enzymes Recent Labs  
  01/24/20 1955 ALB 1.9* Digoxin Thyroid Studies Lab Results Component Value Date/Time  TSH 2.63 01/17/2020 05:30 AM

## 2020-01-25 NOTE — PROGRESS NOTES
[address] Progress Note Patient: Brenda Zacarias MRN: [id number] SSN: [id number]  YOB: 1933 Age: 80 y.o. Sex: female Admit Date: 1/16/2020 LOS: 8 days Chief Complaint Patient presents with  Shortness of Breath  Irregular Heart Beat RVR Subjective: This AM pt intubated and sedated, and on pressors in ICU. PFM talked to daughter and son on rounds today. Family stating that mother would not want dialysis of any kind. Also stating that measures (CPR/intubation/TF etc) were probably more than their mother would have wanted. They want to give her a few more days to assess her recover and then will consider comfort measures. ROS - not done to due to Pt conditions. Objective:  
 
Visit Vitals /43 Pulse 67 Temp 97.9 °F (36.6 °C) Resp 16 Ht 4' 10\" (1.473 m) Wt 68.5 kg (151 lb) SpO2 100% Breastfeeding No  
BMI 31.56 kg/m² Physical Exam:  
General appearance: intubated and sedated Lungs: diffuse crackles throughout Heart: RRR Abdomen: soft, non-tender. Bowel sounds+ Pulses: 2+ and symmetric Intake and Output: 
Current Shift: No intake/output data recorded. Last three shifts: 01/23 0701 - 01/24 1900 In: 2012.5 [I.V.:1182.5] Out: 234 [Urine:234] Lab/Data Review: 
Recent Results (from the past 12 hour(s)) GLUCOSE, POC Collection Time: 01/24/20 12:23 PM  
Result Value Ref Range Glucose (POC) 193 (H) 70 - 110 mg/dL GLUCOSE, POC Collection Time: 01/24/20  5:01 PM  
Result Value Ref Range Glucose (POC) 205 (H) 70 - 110 mg/dL Assessment and Plan:  
 
80 y.o. female with PMH significant for dementia, HLD, HTN, SLE now admitted with CAP and new onset Afib w/ RVR s/p cardiac arrest x3. Now in ICU. S/p Cardiac Arrest  
Pt admitted for sepsis 2/2 CAP being tx with IV azithro and rocephin. Bcx (1/16) neg. Cardiac arrest x3 (1/21). Intubated on pressors in ICU.  Pt code status changed to DNR per family. Labs suggesting septic shock ([facility] 33, LA 6.3), on zosyn and vanc. CXR (1/21) showing multifocal airspace disease which could represent either pulmonary edema or multifocal pneumonia. Etiology of arrest likely resp arrest 2/2 to aspiration. Leukocytosis is now improving and lactic acid resolved. Per ICU reports pt is sedated, but wakens to voice, following commands. A-fib w/RVR - afib w RVR on admission likely 2/2 to underlying PNA. New diagnosis for PT. On dilt drip then transitioned to PO and metoprolol. Following arrest rates up to 190 (1/22) leading to decomp. Amiodarone bolus then drip started. Rates controlled in 60-70s today. Cardiomyopathy - ECHO on admission of (1/17) EF of 60-65%, now with severe systolic dysfunction EF 97-70%. Unknown if secondary to cardiac arrest or causal in nature. Pt remains in NSR w/ rates controlled on amiodarone drip. Cardiology following. JENNIE - Nephrology consulted (1/24), suspect ATN 2/2 cardiac arrest. Going to tx with diuretics and albumin. Hyperglycemia - No hx of DM, but now hyperglycemic, -259, requiring 10u lantas and 24 U lispro yesterday. Potential UTI on admission - asymtpomatic H/o HTN now hypotensive- on amlodipine and losartan at home. Now on pressors. H/o Dementia - on donepezil. Family reports no confusion at baseline prior to admission. Sundowning prior to arrest. HLD - on statin. Asthma - singular, ventolin at home. Trigeminal Neuralgia - on carbamazepine. ICU primary. We appreciate your care of our pt. We will continue to follow. Edna Chua MD PGY-1 
[address]

## 2020-01-25 NOTE — PROGRESS NOTES
Daily Progress Note Review of Systems Physical Exam 
Cardiovascular:  
   Rate and Rhythm: Normal rate. Pulmonary:  
   Effort: No respiratory distress. Abdominal:  
   General: There is distension. Musculoskeletal:  
   Right lower leg: Edema present. Left lower leg: Edema present. A/P: 
 
ARF on CKD3 2/2 ATN in setting of shock, renal fxn, Dr Nicolasa Cabrera note reviewed and pt had previously expressed wishes that she did not want RRT of any duration, therefore managing conservatively-had 5l in and about . 5l out past 48h, her albumin is very low and she is on alb IV, also note hgb is low and she may get blood today so I am going to add lasix to follow the albumin

## 2020-01-25 NOTE — PROGRESS NOTES
[address] [facility] I Progress Note Patient: Farrah Mehta MRN: [id number] SSN: [id number]  YOB: 1933 Age: 80 y.o. Sex: female Admit Date: 1/16/2020 LOS: 9 days Chief Complaint Patient presents with  Shortness of Breath  Irregular Heart Beat RVR Subjective: This AM pt intubated and sedated, and on pressors in ICU. ROS - not done to due to Pt conditions. Objective:  
 
Visit Vitals /48 Pulse 67 Temp 97.3 °F (36.3 °C) Resp 16 Ht 4' 10\" (1.473 m) Wt 72.5 kg (159 lb 13.3 oz) SpO2 96% Breastfeeding No  
BMI 33.41 kg/m² Physical Exam:  
General appearance: intubated and sedated Lungs: diffuse crackles throughout Heart: RRR w/no murmurs appreciated Abdomen: soft, non-tender. Bowel sounds+ Pulses: 2+ and symmetric Ext: b/l edema in hands w/soft restraints in place Intake and Output: 
Current Shift: 01/24 1901 - 01/25 0700 In: 6521 [I.V.:759] Out: 190 [Urine:190] Last three shifts: 01/23 0701 - 01/24 1900 In: 2372.2 [I.V.:1432.2] Out: 284 [Urine:284] Lab/Data Review: 
Recent Results (from the past 12 hour(s)) GLUCOSE, POC Collection Time: 01/24/20  5:01 PM  
Result Value Ref Range Glucose (POC) 205 (H) 70 - 110 mg/dL RENAL FUNCTION PANEL Collection Time: 01/24/20  7:55 PM  
Result Value Ref Range Sodium 136 136 - 145 mmol/L Potassium 4.0 3.5 - 5.5 mmol/L Chloride 100 100 - 111 mmol/L  
 CO2 27 21 - 32 mmol/L Anion gap 9 3.0 - 18 mmol/L Glucose 157 (H) 74 - 99 mg/dL BUN 63 (H) 7.0 - 18 MG/DL Creatinine 4.25 (H) 0.6 - 1.3 MG/DL  
 BUN/Creatinine ratio 15 12 - 20 GFR est AA 12 (L) >60 ml/min/1.73m2 GFR est non-AA 10 (L) >60 ml/min/1.73m2 Calcium 7.2 (L) 8.5 - 10.1 MG/DL Phosphorus 4.5 2.5 - 4.9 MG/DL Albumin 1.9 (L) 3.4 - 5.0 g/dL GLUCOSE, POC Collection Time: 01/24/20 11:12 PM  
Result Value Ref Range Glucose (POC) 166 (H) 70 - 110 mg/dL POC G3  
 Collection Time: 01/25/20  4:02 AM  
Result Value Ref Range Device: VENT    
 FIO2 (POC) 40 % pH (POC) 7.357 7.35 - 7.45    
 pCO2 (POC) 50.1 (H) 35.0 - 45.0 MMHG  
 pO2 (POC) 70 (L) 80 - 100 MMHG  
 HCO3 (POC) 28.1 (H) 22 - 26 MMOL/L  
 sO2 (POC) 93 92 - 97 % Base excess (POC) 3 mmol/L Mode ASSIST CONTROL Tidal volume 300 ml Set Rate 16 bpm  
 PEEP/CPAP (POC) 6 cmH2O  
 PIP (POC) 30 Allens test (POC) N/A Inspiratory Time 1 sec Total resp. rate 16 Site DRAWN FROM ARTERIAL LINE Specimen type (POC) ARTERIAL Performed by Jennifer Damon Volume control plus YES Assessment and Plan:  
 
80 y.o. female with PMH significant for dementia, HLD, HTN, SLE now admitted with CAP and new onset Afib w/ RVR s/p cardiac arrest x3. Now in ICU. ICU Primary Daily - follow up w/family regarding goals of care S/p Cardiac Arrest  
Pt admitted for sepsis 2/2 CAP being tx with IV azithro and rocephin. Bcx (1/16) neg. Cardiac arrest x3 (1/21). Intubated on pressors in ICU. Pt code status changed to DNR per family. Labs suggesting septic shock ([facility]cc 33, LA 6.3), on zosyn and vanc. CXR (1/21) showing multifocal airspace disease which could represent either pulmonary edema or multifocal pneumonia. Etiology of arrest likely resp arrest 2/2 to aspiration. A-fib w/RVR - afib w RVR on admission likely 2/2 to underlying PNA. New diagnosis for PT. On dilt drip then transitioned to PO and metoprolol. Following arrest rates up to 190 (1/22) leading to decomp. Amiodarone bolus then drip started. Rates controlled in 60-70s today. Cardiomyopathy - ECHO on admission of (1/17) EF of 60-65%, now with severe systolic dysfunction EF 70-60%. Unknown if secondary to cardiac arrest or causal in nature. Pt remains in NSR w/ rates controlled on amiodarone drip. Cardiology following. JENNIE - Nephrology consulted (1/24), suspect ATN 2/2 cardiac arrest. Going to tx with diuretics and albumin. Hyperglycemia - No hx of DM, but now hyperglycemic, -259, requiring 10u lantas and 24 U lispro yesterday. Potential UTI on admission - asymtpomatic H/o HTN now hypotensive- on amlodipine and losartan at home. Now on pressors. H/o Dementia - on donepezil. Family reports no confusion at baseline prior to admission. Sundowning prior to arrest. HLD - on statin. Asthma - singular, ventolin at home. Trigeminal Neuralgia - on carbamazepine. ICU primary. We appreciate your care of our pt. We will continue to follow.  
 
Deepali Alvarez MD 
[facility] PGY-1

## 2020-01-25 NOTE — ROUTINE PROCESS
Bedside and Verbal shift change report given to Beck Dawson RN (oncoming nurse) by Amy Malloy RN (offgoing nurse). Report included the following information SBAR, Kardex, ED Summary, Intake/Output, MAR, Recent Results and Cardiac Rhythm Sinus Rhythm-Sinus Susan Buff.

## 2020-01-25 NOTE — ROUTINE PROCESS
Bedside and Verbal shift change report given to Leda POLK (oncoming nurse) by Brandon Joshua (offgoing nurse). Report included the following information SBAR, Kardex, MAR and Recent Results.

## 2020-01-26 NOTE — PROGRESS NOTES
Daily Progress Note Review of Systems Physical Exam 
Cardiovascular:  
   Rate and Rhythm: Normal rate. Pulmonary:  
   Effort: No respiratory distress. Abdominal:  
   General: There is distension. Musculoskeletal:  
   Right lower leg: Edema present. Left lower leg: Edema present. A/P: 
 
ARF on CKD3 2/2 ATN in setting of shock, renal fxn continues to worsen, Dr Katelyn Echols note indicates pt had previously expressed wishes that she did not want RRT of any duration, therefore managing conservatively-UOP remains very poor, on iv alb, increase lasix, add 2 doses of diuril

## 2020-01-26 NOTE — PROGRESS NOTES
[facility]  -  Progress Note Patient: Cele Robles MRN: [id number]  SSN: [id number] YOB: 1933  Age: 80 y.o. Sex: female Admit Date: 1/16/2020 Assessment:  
 
Hospital Problems  Date Reviewed: 6/6/2019 Codes Class Noted POA  
 CAP (community acquired pneumonia) ICD-10-CM: J18.9 ICD-9-CM: 707  1/17/2020 Unknown * (Principal) Sepsis due to pneumonia [facility]) ICD-10-CM: J18.9, A41.9 ICD-9-CM: 457, 995.91  1/17/2020 Unknown Hyponatremia ICD-10-CM: E87.1 ICD-9-CM: 276.1  1/16/2020 Unknown A-fib [facility]) ICD-10-CM: I48.91 
ICD-9-CM: 427.31  1/16/2020 Unknown Pneumonia ICD-10-CM: J18.9 ICD-9-CM: 144  1/16/2020 Unknown  
   
  
 
-S/p code blue arrest 1/21/2020. Like primary pulmonary process with aspiration resulting in bradycardic pulseless arrest. Patient is now intubated. -Sepsis likely secondary to Community Acquired Pneumonia along with [address] with EF now 25-30% s/p arrest with large anteroapical wall motion normality consistent with Takotsubo syndrome (EF 60-65% 1/17/2020). -Atrial fibrillation with RVR. New diagnosis. Likely driven by underlying pulmonary process. Afib RVR with hypotension requiring emergent cardioversion 1/22/2020. Candidacy for long term [address] [place] [address] Road was being discussed prior to arrest, patient was on lovenox full dose which has now been stopped. -H/o syncope/bradycardia while on BB 7/2018. Patient underwent a pharmacologic nuclear stress test, echo and a 30-day event monitor at that time, all of which were normal.  
-Hypertension. Stable. -Hyperlipidemia. On statin. -SL with lupus nephritis. 
-Chronic HAJI. -Chronic pain, fibromyalgia. 
-Anxiety, depression, current delirium? 
-Normal EF 60-65% by echo this admission. 
-Patient is now DNR. 
  
Primary cardiologist Dr. Veleta Stairs. Plan:   
 
Will need to continue with IV Lasix as patient is in the positive with volume. Will monitor output and may need to increase as necessary. Full code and will continue with supportive care until patient makes recovery, which is guarded at this time. Continue with pressors to try to keep SBP >100 Subjective: Intubated and sedated. Per critical notes the family has removed the DNR status to full code. Patient had bronchoscopy after code and received albumin Objective:  
  
Patient Vitals for the past 8 hrs: 
 Temp Pulse Resp BP SpO2  
01/26/20 0800 98.5 °F (36.9 °C) 60 18 96/43 97 % 01/26/20 0700  (!) 59 18 99/42 97 % 01/26/20 0630  (!) 59 18  98 % 01/26/20 0615  60 18  97 % 01/26/20 0600  (!) 58 18 98/40 98 % 01/26/20 0530  60 18  98 % 01/26/20 0500  62 18 103/43 97 % 01/26/20 0430  65 18  98 % 01/26/20 0400 97.3 °F (36.3 °C) (!) 58 19 142/67 96 % 01/26/20 0330  (!) 59 18  98 % 01/26/20 0300  60 18 104/42 96 % 01/26/20 0230  61 18  98 % 01/26/20 0200  60 18 106/42 98 % 01/26/20 0144   18   Patient Vitals for the past 96 hrs: 
 Weight  
01/26/20 0400 73.1 kg (161 lb 2.5 oz) 01/25/20 0223 72.5 kg (159 lb 13.3 oz) 01/22/20 1407 68.5 kg (151 lb) Intake/Output Summary (Last 24 hours) at 1/26/2020 5029 Last data filed at 1/26/2020 0700 Gross per 24 hour Intake 1867.5 ml Output 353 ml Net 1514.5 ml Physical Exam: 
General:  no distress, appears stated age, intubated, sedated Neck:  no JVD Lungs:  rhonchi R anterior > L anterior Heart:  regular rate and rhythm, S1, S2 normal, no murmur, click, rub or gallop Extremities:  extremities normal, atraumatic, no cyanosis or edema Data Review:  
 
Labs: Results:  
   
Chemistry Recent Labs  
  01/26/20 
0340 01/25/20 0530 01/24/20 1955 01/24/20 
0400 GLU 96 153* 157* 177*  136 136 140  
K 3.1* 3.4* 4.0 3.2*  
 100 100 102 CO2 24 24 27 27 BUN 74* 66* 63* 56* CREA 5.19* 4.51* 4.25* 3.74* CA 7.5* 7.3* 7.2* 7.4*  
 MG 2.0 2.0  --  2.1 PHOS  --   --  4.5  --   
AGAP 14 12 9 11 BUCR 14 15 15 15 ALB  --   --  1.9*  --   
  
CBC w/Diff Recent Labs  
  01/26/20 
0340 01/25/20 
0530 01/24/20 
0400 WBC 7.5 12.0 15.9*  
RBC 1.92* 1.99* 2.26* HGB 6.2* 6.5* 7.4* HCT 18.3* 19.3* 21.9*  
 170 209 GRANS 81* 91* 91* LYMPH 11* 5* 3* EOS 0 0 0 Cardiac Enzymes No results found for: CPK, CK, CKMMB, CKMB, RCK3, CKMBT, CKNDX, CKND1, MAYITO, TROPT, TROIQ, CARRIE, TROPT, TNIPOC, BNP, BNPP Coagulation No results for input(s): PTP, INR, APTT, INREXT in the last 72 hours. Lipid Panel No results found for: CHOL, CHOLPOCT, CHOLX, CHLST, CHOLV, 334994, HDL, HDLP, LDL, LDLC, DLDLP, 174674, VLDLC, VLDL, TGLX, TRIGL, TRIGP, TGLPOCT, CHHD, CHHDX  
BNP No results found for: BNP, BNPP, XBNPT Liver Enzymes Recent Labs  
  01/24/20 1955 ALB 1.9* Digoxin Thyroid Studies Lab Results Component Value Date/Time  TSH 2.63 01/17/2020 05:30 AM

## 2020-01-26 NOTE — PROGRESS NOTES
Problem: Falls - Risk of 
Goal: *Absence of Falls Description Document Alberto Womack Fall Risk and appropriate interventions in the flowsheet. Outcome: Progressing Towards Goal 
Note: Fall Risk Interventions: 
Mobility Interventions: Communicate number of staff needed for ambulation/transfer, Strengthening exercises (ROM-active/passive) Mentation Interventions: Adequate sleep, hydration, pain control, Door open when patient unattended, Evaluate medications/consider consulting pharmacy, More frequent rounding, Reorient patient, Toileting rounds, Update white board, Room close to nurse's station Medication Interventions: Evaluate medications/consider consulting pharmacy Elimination Interventions: Call light in reach, Toileting schedule/hourly rounds Problem: Pressure Injury - Risk of 
Goal: *Prevention of pressure injury Description Document Tristen Scale and appropriate interventions in the flowsheet. Outcome: Progressing Towards Goal 
Note: Pressure Injury Interventions: 
Sensory Interventions: Turn and reposition approx. every two hours (pillows and wedges if needed), Pad between skin to skin, Monitor skin under medical devices, Minimize linen layers, Keep linens dry and wrinkle-free, Check visual cues for pain, Assess changes in LOC Moisture Interventions: Absorbent underpads, Apply protective barrier, creams and emollients, Internal/External urinary devices, Check for incontinence Q2 hours and as needed, Minimize layers, Moisture barrier Activity Interventions: Pressure redistribution bed/mattress(bed type) Mobility Interventions: Turn and reposition approx. every two hours(pillow and wedges), HOB 30 degrees or less Nutrition Interventions: Document food/fluid/supplement intake, Discuss nutritional consult with provider Friction and Shear Interventions: Apply protective barrier, creams and emollients, Foam dressings/transparent film/skin sealants, Lift sheet Problem: Ventilator Management Goal: *Adequate oxygenation and ventilation Outcome: Progressing Towards Goal 
Goal: *Patient maintains clear airway/free of aspiration Outcome: Progressing Towards Goal 
Goal: *Absence of infection signs and symptoms Outcome: Progressing Towards Goal 
Goal: *Normal spontaneous ventilation Outcome: Progressing Towards Goal 
  
Problem: Non-Violent Restraints Goal: *Removal from restraints as soon as assessed to be safe Outcome: Progressing Towards Goal 
Goal: *No harm/injury to patient while restraints in use Outcome: Progressing Towards Goal 
Goal: *Patient's dignity will be maintained Outcome: Progressing Towards Goal 
Goal: *Patient Specific Goal (EDIT GOAL, INSERT TEXT) Outcome: Progressing Towards Goal 
Goal: Non-violent Restaints:Standard Interventions Outcome: Progressing Towards Goal 
Goal: Non-violent Restraints:Patient Interventions Outcome: Progressing Towards Goal 
Goal: Patient/Family Education Outcome: Progressing Towards Goal 
  
Problem: Nutrition Deficit Goal: *Optimize nutritional status Outcome: Progressing Towards Goal 
  
Problem: Pain Goal: *Control of Pain Outcome: Progressing Towards Goal 
Goal: *PALLIATIVE CARE:  Alleviation of Pain Outcome: Progressing Towards Goal

## 2020-01-26 NOTE — ROUTINE PROCESS
Bedside and Verbal shift change report given to Carlo Hidalgo RN (oncoming nurse) by Sudhakar Augustin RN (offgoing nurse). Report included the following information SBAR, Kardex, ED Summary, Intake/Output, MAR, Recent Results, Med Rec Status and Cardiac Rhythm Sinus Rhythm-Sinus Rajan Worrell.

## 2020-01-26 NOTE — PROGRESS NOTES
[facility]. Pulmonary, Critical Care, and Sleep Medicine Name: William Weaver MRN: [id number] : 1933 Hospital: [address] [place] [address] Dr Date: 2020  Admission Date: 2020 Chart and notes reviewed. Data reviewed. I have evaluated all findings. [x]I have reviewed the flowsheet and previous days notes. [x]The patient is unable to give any meaningful history or review of systems because the patient is: 
[x]Intubated [x]Sedated  
[]Unresponsive [x]The patient is critically ill on     
[x]Mechanical ventilation [x]Pressors []BiPAP [] Patient is a 80 y.o. female with a history of baseline dementia, HTN, HLD, SLE, trigeminal neuralgia and was admitted on 20 in afib with RVR with CAP and hyponatremia. Prior to admission patient had a cough for past 2 weeks. During hospitalization the patient was being treated for CAP with improving SIRS criteria, Bcx negative, started on rocephin and azithromycin in ED. Patient during hospital stay pt had been hemodynamically stable on antihypertensives, during the evening of 20pt started to require more O2 and was placed on NC, then around 18:48 pt went into cardiac arrest, code blue was called, and RRT achieved ROSC twice with 3 rounds of epi, final ROSC achieved at 1916. Unclear from treatment team as to why the patient had a cardiac arrest but presumably because she may have developed acute respiratory failure from possible aspiration. Transferred to ICU for higher level care.  
  
20 Patient appears comfortable this morning on exam.  no acute events overnight. Team Spoke with patients family, stated that she would have never wanted to be sustained by machines. Mentation: Sedated, awakens to voice,not following commands today Respiratory/ Secretions: Ventilator, thick tan secretions Hemodynamics: HD stable Urine output: 408 ml overnight ROS:Review of systems not obtained due to patient factors. Events and notes from last 24 hours reviewed. Care plan discussed on multidisciplinary rounds. Patient Active Problem List  
Diagnosis Code  Syncope R55  
 HTN (hypertension) I10  
 Trigeminal neuralgia G50.0  SLE (systemic lupus erythematosus) (formerly [facility]) M32.9  Dyslipidemia E78.5  Hyponatremia E87.1  A-fib (formerly [facility]) I48.91  
 Pneumonia J18.9  
 CAP (community acquired pneumonia) J18.9  Sepsis due to pneumonia (formerly [facility]) J18.9, A41.9 Vital Signs: 
Visit Vitals /44 Pulse (!) 58 Temp 97.4 °F (36.3 °C) Resp 18 Ht 4' 10\" (1.473 m) Wt 73.1 kg (161 lb 2.5 oz) SpO2 97% Breastfeeding No  
BMI 33.68 kg/m² O2 Device: Endotracheal tube O2 Flow Rate (L/min): 4 l/min Temp (24hrs), Av.6 °F (36.4 °C), Min:96.7 °F (35.9 °C), Max:98.5 °F (36.9 °C) Intake/Output:  
Last shift:       07 -  190 In: 233 [I.V.:89] Out: 85 [Urine:85] Last 3 shifts: 1901 -  0700 In: 4303.5 [I.V.:2153.5] Out: 743 [FVPKZ:816] Intake/Output Summary (Last 24 hours) at 2020 1742 Last data filed at 2020 1600 Gross per 24 hour Intake 1434.75 ml Output 278 ml Net 1156.75 ml Ventilator Settings: 
Ventilator Mode: Assist control, VC+ Respiratory Rate Back-Up Rate: 18 Insp Time (sec): 1 sec I:E Ratio: 1:3.5 Ventilator Volumes Vt Set (ml): 300 ml Vt Exhaled (Machine Breath) (ml): 318 ml Vt Spont (ml): 318 ml Ve Observed (l/min): 5.73 l/min Ventilator Pressures PIP Observed (cm H2O): 26 cm H2O Plateau Pressure (cm H2O): 22 cm H2O 
MAP (cm H2O): 12 PEEP/VENT (cm H2O): 5 cm H20 Auto PEEP Observed (cm H2O): 0 cm H2O Current Facility-Administered Medications Medication Dose Route Frequency  furosemide (LASIX) injection 40 mg  40 mg IntraVENous Q6H  
 chlorothiazide (DIURIL) 250 mg in sterile water (preservative free) 9 mL injection  250 mg IntraVENous Q12H  albumin human 25% (BUMINATE) solution 25 g  25 g IntraVENous Q6H  
 famotidine (PF) (PEPCID) 20 mg in 0.9% sodium chloride 10 mL injection  20 mg IntraVENous Q24H  
 insulin glargine (LANTUS) injection 10 Units  10 Units SubCUTAneous DAILY  piperacillin-tazobactam (ZOSYN) 2.25 g in 0.9% sodium chloride (MBP/ADV) 50 mL MBP  2.25 g IntraVENous Q6H  
 insulin lispro (HUMALOG) injection   SubCUTAneous Q6H  
 sodium chloride 3% hypertonic nebulizer soln  4 mL Nebulization QID RT  
 albuterol (PROVENTIL VENTOLIN) nebulizer solution 2.5 mg  2.5 mg Nebulization QID RT  
 multivitamin (MULTI-DELYN, WELLESSE) oral liquid 30 mL  30 mL Per NG tube DAILY  heparin (porcine) injection 5,000 Units  5,000 Units SubCUTAneous Q8H  
 chlorhexidine (PERIDEX) 0.12 % mouthwash 10 mL  10 mL Oral Q12H  
 fentaNYL (PF) 900 mcg/30 ml infusion soln  0-200 mcg/hr IntraVENous TITRATE  midazolam in normal saline (VERSED) 2 mg/mL infusion  1-10 mg/hr IntraVENous TITRATE  vitamin E acetate capsule 400 Units  400 Units Oral DAILY  ascorbic acid (vitamin C) (VITAMIN C) tablet 500 mg  500 mg Oral DAILY  atorvastatin (LIPITOR) tablet 10 mg  10 mg Oral DAILY  carBAMazepine XR (TEGretol XR) tablet 100 mg  100 mg Oral Q12H  cholecalciferol (VITAMIN D3) (400 Units /10 mcg) tablet 2.5 Tab  1,000 Units Oral DAILY  omega 3-DHA-EPA-fish oil 1,000 mg (120 mg-180 mg) capsule 1 Cap  1 Cap Oral DAILY Telemetry: Sinus rhythm Physical Exam:  
 General: sedated on ventilator, min responsive today, occasionally opens eyes to voice HEENT: ED, mucous membranes moist 
 Lungs: symmetric air entry but diffusely coarse breath sounds Heart: Regular rate and rhythm,  S1S2 present Abdomen:  tenderness, masses, organomegaly or guarding\"} Extremity: diffuse anasarca, unchanged from 1/25 Neuro: drowsy, occasional eye opening Skin: Skin color, texture, turgor normal. No rashes or lesions DATA: 
 MAR reviewed and pertinent medications noted or modified as needed Labs: 
Recent Labs  
  01/26/20 
0340 01/25/20 
0530 01/24/20 
0400 WBC 7.5 12.0 15.9* HGB 6.2* 6.5* 7.4* HCT 18.3* 19.3* 21.9*  
 170 209 Recent Labs  
  01/26/20 
0340 01/25/20 
0530 01/24/20 
1955 01/24/20 
0400  136 136 140  
K 3.1* 3.4* 4.0 3.2*  
 100 100 102 CO2 24 24 27 27 GLU 96 153* 157* 177* BUN 74* 66* 63* 56* CREA 5.19* 4.51* 4.25* 3.74* CA 7.5* 7.3* 7.2* 7.4* MG 2.0 2.0  --  2.1 PHOS  --   --  4.5  --   
ALB  --   --  1.9*  -- No results for input(s): PH, PCO2, PO2, HCO3, FIO2 in the last 72 hours. Recent Labs  
  01/26/20 
0428 01/25/20 
0402 01/24/20 
0414 FIO2I 40 40 40 HCO3I 26.0 28.1* 29.0*  
PCO2I 41.6 50.1* 38.1 PHI 7.404 7.357 7.489* PO2I 77* 70* 112* Imaging: 
[x]   I have personally reviewed the patients radiographs and reports XR Results (most recent): 
Results from Hospital Encounter encounter on 01/16/20 XR CHEST PORT Narrative Portable Chest 
 
CPT CODE: 53237 HISTORY: Intubation. FINDINGS:  
 
Comparison with 1/25/2020 Multiple wires overlie the patient. Endotracheal tube tip is approximately 3.7 
cm above the rich. Enteric tube courses below the diaphragm tip not 
visualized. Haziness of the lung bases right greater than left. Indistinctness 
of the pulmonary vessels. No definite pneumothorax. Impression IMPRESSION: 
 
Grossly unchanged pulmonary edema with bilateral effusions right greater than 
left. Endotracheal tube tip is above the rich CT Results (most recent): 
Results from Hospital Encounter encounter on 07/17/18 CT HEAD WO CONT Narrative CT HEAD WITHOUT IV CONTRAST INDICATION: Syncope/fainting. COMPARISON: CT head 2/27/2018. TECHNIQUE: Serial axial CT images were obtained from the skull vertex to foramen 
magnum without IV contrast. All CT scans at this facility are performed using dose optimization technique as appropriate to a performed exam, to include 
automated exposure control, adjustment of the mA and/or kV according to 
patient's size (including appropriate matching for site-specific examinations), 
or use of iterative reconstruction technique. FINDINGS: 
Visualized paranasal sinuses are free from significant mucosal disease. Lonnie Arzola Buck-white matter differentiation appears preserved. Moderate 
periventricular/cerebral white matter hypoattenuation, grossly unchanged. .No 
evidence for acute intracranial hemorrhage, acute infarct, or mass lesion. No 
evidence for hydrocephalus. . The calvarium appears intact. Impression IMPRESSION: 
No evidence for acute intracranial process. Lonnie Arzola Moderate white matter disease, presumed chronic ischemic. A preliminary reading was placed in PACS by Dr. Donna Ham at [phone] hours 7/18/2018. IMPRESSION:  
· Cardiac arrest x3 - Code blue on the floor, most likely triggered by respiratory failure with volume overload or possible aspiration pneumonia. · Acute hypoxic respiratory failure requiring intubation and mechanical ventilation · Severe sepsis with septic shock- likely 2/2 pneumonia. Decreasing fever and leukocytosis · Atrial fibrillation with RVR- cardioverted 1/22 and has been in NSR since · Lactic acidosis- resolved · Hyperglycemia - No hx of DM · Encephalopathy- toxic/metabolic vs anoxic. Despite long arrest, patient is rousable, alert, and following commands · Cardiomyopathy, new onset: TTE post cardiac arrest shows LVEF of 25%, previously  was 55%. unclear if this precipitated cardiac arrest 
· JENNIE- likely ischemic ATN- renal indices continue to worsen · HTN  
· HLD · Anemia- no active bleeding · Hyponatremia- resolved · Electrolytes- hypokalemia, hypomagnesemia on replacement · Trigeminal Neuralgia - on Carbamezapine · UTI no initial cultures sent - unlikely source of sepsis from initial UA.   
   
  
 
 Patient Active Problem List  
Diagnosis Code  Syncope R55  
 HTN (hypertension) I10  
 Trigeminal neuralgia G50.0  SLE (systemic lupus erythematosus) ([facility]) M32.9  Dyslipidemia E78.5  Hyponatremia E87.1  A-fib ([facility]) I48.91  
 Pneumonia J18.9  
 CAP (community acquired pneumonia) J18.9  Sepsis due to pneumonia ([facility]) J18.9, A41.9 RECOMMENDATIONS:  
· Resp: continue vent support. Titrate FiO2/ supp O2 for SpO2 >90%; VAP bundle Wean vent as tolerated. · I/D: WBCs downtrending today. Afebrile overnight. Continue Vanc/Zosyn. Cultures negative to date. CXR unchanged with bilateral opacities and moderate bilateral pleural effusions · Hem/Onc: Daily CBC. Monitor for signs of active bleeding. Transfuse for hbg <7. 
· CVS: NSR on amio gtt. Titrate vasopressors, aim MAP >65mmHg, will try to wean. Cardiology following- recommend continuing amio drip, start BB when BP can tolerate · Metabolic: Daily BMP; monitor e-lytes; replace PRN. Continue SSI · Renal: Trend Renal indices. Cr continues to increase, minimal urine output. Nephrology on board Patient's family is very clear that patient would not want dialysis · Endocrine: POC Glucose q6; glycemic control as needed with correctional scale insulin · GI: SUP Protonix daily, TF with goal of 40/hr- advanced · Musc/Skin: No acute issues, wound care as needed · Neuro: Fentanyl  and versed gtt for sedation for RAAS 0 to -1. Continue Carbamezapine for Trigeminal neuralgia. Monitor neuro status closely, patient able to follow commands today. · Code Status: DNR - After ROSC was achieved during code blue, Family wanted emergent intubation . According to Dr. Marcel Perez note on 1/21/20 she discussed with the family and they changed her code status from FULL to DNR. Palliative care consulted to discuss goals of care with family per request.   
 
Best practice : 
 
Glycemic control IHI ICU bundles: Central Line Bundle Followed , Harrell Bundle Followed and Vent Bundle Followed, Vent Day 5 [facility] Vent patients- VAP bundle, aim to keep peak plateau pressure 82-39EL H2O Sress ulcer prophylaxis. DVT prophylaxis. Need for Lines, harrell assessed. Restraints need. Palliative care evaluation. High complexity decision making was performed during this consultation and evaluation. Critical care time 32 minutes excluding procedures 
[x]       Pt is at high risk for further organ failure and dysfunction.   
 
 
Amilcar Puente MD,

## 2020-01-26 NOTE — PROGRESS NOTES
Problem: Falls - Risk of 
Goal: *Absence of Falls Description Document Lashanda De La Rosa Fall Risk and appropriate interventions in the flowsheet. Outcome: Progressing Towards Goal 
Note: Fall Risk Interventions: 
Mobility Interventions: Communicate number of staff needed for ambulation/transfer, Strengthening exercises (ROM-active/passive) Mentation Interventions: Adequate sleep, hydration, pain control, Door open when patient unattended, Evaluate medications/consider consulting pharmacy, More frequent rounding, Reorient patient, Toileting rounds, Update white board, Room close to nurse's station Medication Interventions: Evaluate medications/consider consulting pharmacy Elimination Interventions: Call light in reach, Toileting schedule/hourly rounds Problem: Patient Education: Go to Patient Education Activity Goal: Patient/Family Education Outcome: Progressing Towards Goal 
  
Problem: Pressure Injury - Risk of 
Goal: *Prevention of pressure injury Description Document Tristen Scale and appropriate interventions in the flowsheet. Outcome: Progressing Towards Goal 
Note: Pressure Injury Interventions: 
Sensory Interventions: Turn and reposition approx. every two hours (pillows and wedges if needed), Pad between skin to skin, Monitor skin under medical devices, Minimize linen layers, Keep linens dry and wrinkle-free, Check visual cues for pain, Assess changes in LOC Moisture Interventions: Absorbent underpads, Apply protective barrier, creams and emollients, Internal/External urinary devices, Check for incontinence Q2 hours and as needed, Minimize layers, Moisture barrier Activity Interventions: Pressure redistribution bed/mattress(bed type) Mobility Interventions: Turn and reposition approx. every two hours(pillow and wedges), HOB 30 degrees or less Nutrition Interventions: Document food/fluid/supplement intake, Discuss nutritional consult with provider Friction and Shear Interventions: Apply protective barrier, creams and emollients, Foam dressings/transparent film/skin sealants, Lift sheet Problem: Patient Education: Go to Patient Education Activity Goal: Patient/Family Education Outcome: Progressing Towards Goal 
  
Problem: Pneumonia: Day 3 Goal: Activity/Safety Outcome: Progressing Towards Goal 
Goal: Consults, if ordered Outcome: Progressing Towards Goal 
Goal: Diagnostic Test/Procedures Outcome: Progressing Towards Goal 
Goal: Nutrition/Diet Outcome: Progressing Towards Goal 
Goal: Discharge Planning Outcome: Progressing Towards Goal 
Goal: Medications Outcome: Progressing Towards Goal 
Goal: Respiratory Outcome: Progressing Towards Goal 
Goal: Treatments/Interventions/Procedures Outcome: Progressing Towards Goal 
Goal: Psychosocial 
Outcome: Progressing Towards Goal 
Goal: *Oxygen saturation within defined limits Outcome: Progressing Towards Goal 
Goal: *Hemodynamically stable Outcome: Progressing Towards Goal 
Goal: *Demonstrates progressive activity Outcome: Progressing Towards Goal 
Goal: *Tolerating diet Outcome: Progressing Towards Goal 
Goal: *Describes available resources and support systems Outcome: Progressing Towards Goal 
Goal: *Optimal pain control at patient's stated goal 
Outcome: Progressing Towards Goal 
  
Problem: Pneumonia: Day 4 Goal: Activity/Safety Outcome: Progressing Towards Goal 
Goal: Nutrition/Diet Outcome: Progressing Towards Goal 
Goal: Discharge Planning Outcome: Progressing Towards Goal 
Goal: Medications Outcome: Progressing Towards Goal 
Goal: Respiratory Outcome: Progressing Towards Goal 
Goal: Treatments/Interventions/Procedures Outcome: Progressing Towards Goal 
Goal: Psychosocial 
Outcome: Progressing Towards Goal 
  
Problem: Pneumonia: Discharge Outcomes Goal: *Demonstrates progressive activity Outcome: Progressing Towards Goal 
 Goal: *Describes follow-up/return visits to physicians Outcome: Progressing Towards Goal 
Goal: *Tolerating diet Outcome: Progressing Towards Goal 
Goal: *Verbalizes name, dosage, time, side effects, and number of days to continue medications Outcome: Progressing Towards Goal 
Goal: *Influenza immunization Outcome: Progressing Towards Goal 
Goal: *Pneumococcal immunization Outcome: Progressing Towards Goal 
Goal: *Respiratory status at baseline Outcome: Progressing Towards Goal 
Goal: *Vital signs within defined limits Outcome: Progressing Towards Goal 
Goal: *Describes available resources and support systems Outcome: Progressing Towards Goal 
Goal: *Optimal pain control at patient's stated goal 
Outcome: Progressing Towards Goal 
  
Problem: Patient Education: Go to Patient Education Activity Goal: Patient/Family Education Outcome: Progressing Towards Goal 
  
Problem: Afib Pathway: Day 1 Goal: Consults, if ordered Outcome: Progressing Towards Goal 
Goal: Diagnostic Test/Procedures Outcome: Progressing Towards Goal 
Goal: Medications Outcome: Progressing Towards Goal 
Goal: Treatments/Interventions/Procedures Outcome: Progressing Towards Goal 
Goal: Psychosocial 
Outcome: Progressing Towards Goal 
  
Problem: Patient Education: Go to Patient Education Activity Goal: Patient/Family Education Outcome: Progressing Towards Goal 
  
Problem: Patient Education: Go to Patient Education Activity Goal: Patient/Family Education Outcome: Progressing Towards Goal 
  
Problem: Ventilator Management Goal: *Adequate oxygenation and ventilation Outcome: Progressing Towards Goal 
Goal: *Patient maintains clear airway/free of aspiration Outcome: Progressing Towards Goal 
Goal: *Absence of infection signs and symptoms Outcome: Progressing Towards Goal 
Goal: *Normal spontaneous ventilation Outcome: Progressing Towards Goal 
  
Problem: Patient Education: Go to Patient Education Activity Goal: Patient/Family Education Outcome: Progressing Towards Goal 
  
Problem: Non-Violent Restraints Goal: *Removal from restraints as soon as assessed to be safe Outcome: Progressing Towards Goal 
Goal: *No harm/injury to patient while restraints in use Outcome: Progressing Towards Goal 
Goal: *Patient's dignity will be maintained Outcome: Progressing Towards Goal 
Goal: *Patient Specific Goal (EDIT GOAL, INSERT TEXT) Outcome: Progressing Towards Goal 
Goal: Non-violent Restaints:Standard Interventions Outcome: Progressing Towards Goal 
Goal: Non-violent Restraints:Patient Interventions Outcome: Progressing Towards Goal 
Goal: Patient/Family Education Outcome: Progressing Towards Goal 
  
Problem: Nutrition Deficit Goal: *Optimize nutritional status Outcome: Progressing Towards Goal 
  
Problem: Pain Goal: *Control of Pain Outcome: Progressing Towards Goal 
Goal: *PALLIATIVE CARE:  Alleviation of Pain Outcome: Progressing Towards Goal

## 2020-01-26 NOTE — PROGRESS NOTES
1900 Bedside verbal shift change report given to Leda (oncoming nurse) by Ashu Figueroa (offgoing nurse). Report included the following information SBAR, Kardex, Intake/Output and MAR.

## 2020-01-26 NOTE — PROGRESS NOTES
0710 Bedside, verbal report received from Jerry Worley rn. Gtt meds verified, pt intubated, restrained, resting comfortably. 0800 Physical assessment completed at this time. Incontinence care provided. 1100 1 unit of RBC ordered, no consent for blood transfusion present in pt's chart. Left the voicemail to pt's daughter.

## 2020-01-27 NOTE — HOSPICE
Referral received. Chart review in process. Hospice will follow for extubation. Thank you for the referral to [address] [place] [address]. Please contact [phone] with any questions or concerns. LINDA MacielN, RN Clinical Coordinator [facility] [address] [place], [facility] 
[phone]

## 2020-01-27 NOTE — PROGRESS NOTES
[address] [facility] I Progress Note Patient: Farrah Mehta MRN: [id number] SSN: [id number]  YOB: 1933 Age: 80 y.o. Sex: female Admit Date: 1/16/2020 LOS: 10 days Chief Complaint Patient presents with  Shortness of Breath  Irregular Heart Beat RVR Subjective: This AM pt intubated and sedated in the ICU. Now off pressors. ROS - not done to due to Pt conditions. Objective:  
 
Visit Vitals /48 Pulse 65 Temp 97.4 °F (36.3 °C) Resp 18 Ht 4' 10\" (1.473 m) Wt 73.1 kg (161 lb 2.5 oz) SpO2 100% Breastfeeding No  
BMI 33.68 kg/m² Physical Exam:  
General appearance: intubated and sedated Lungs: diffuse crackles throughout Heart: RRR w/no murmurs appreciated Abdomen: soft, non-tender. Bowel sounds+ Pulses: 2+ and symmetric Ext: b/l edema in hands w/soft restraints in place : harrell in situ draining about 50cc pale yellow urine Intake and Output: 
Current Shift: No intake/output data recorded. Last three shifts: 01/25 0701 - 01/26 1900 In: 3022.6 [I.V.:818.6] Out: 493 [JLXZE:353] Lab/Data Review: 
Recent Results (from the past 12 hour(s)) TYPE + CROSSMATCH Collection Time: 01/26/20 12:00 PM  
Result Value Ref Range Crossmatch Expiration 01/29/2020 ABO/Rh(D) A POSITIVE Antibody screen NEG   
 CALLED TO: NOTIFIED JOSE, ICU, 1/26/20 AT 12:57 CK Unit number [id number] Blood component type RC LR Unit division 00 Status of unit ISSUED Crossmatch result Compatible GLUCOSE, POC Collection Time: 01/26/20 12:38 PM  
Result Value Ref Range Glucose (POC) 131 (H) 70 - 110 mg/dL GLUCOSE, POC Collection Time: 01/26/20  5:52 PM  
Result Value Ref Range Glucose (POC) 84 70 - 110 mg/dL Assessment and Plan:  
 
80 y.o. female with PMH significant for dementia, HLD, HTN, SLE now admitted with CAP and new onset Afib w/ RVR s/p cardiac arrest x3. Now in ICU. ICU Primary Daily - follow up w/family regarding goals of care. Family to decide if they want to pursue temp dialysis. Palliative meeting 3pm tomorrow. S/p Cardiac Arrest  
Pt admitted for sepsis 2/2 CAP being tx with IV azithro and rocephin. Bcx (1/16) neg. Cardiac arrest x3 (1/21). Intubated and sedated  in ICU. Pt code status changed to DNR per family. Labs suggesting septic shock (Wcc 33, LA 6.3), on zosyn. CXR (1/21) showing multifocal airspace disease which could represent either pulmonary edema or multifocal pneumonia. Etiology of arrest likely resp arrest 2/2 to aspiration. Pt now off pressors. A-fib w/RVR - afib w RVR on admission likely 2/2 to underlying PNA. New diagnosis for PT. On dilt drip then transitioned to PO and metoprolol. Following arrest rates up to 190 (1/22) leading to decomp. Amiodarone bolus then drip started. Rates controlled in 50-60s today. Cardiomyopathy - ECHO on admission of (1/17) EF of 60-65%, now with severe systolic dysfunction EF 13-13%. Unknown if secondary to cardiac arrest or causal in nature. Pt remains in NSR w/ rates controlled on amiodarone drip. Cardiology following. Repeat ECHO tomorrow in AM to assess cardiac fxn. JENNIE - Nephrology consulted (1/24), suspect ATN 2/2 cardiac arrest. Going to tx with diuretics and albumin. Cr still rising w/ very little UOP. Family to decide if they wish to pursue temporary dialysis. Potential UTI on admission - asymtpomatic H/o HTN now hypotensive- on amlodipine and losartan at home. Now on pressors. H/o Dementia - on donepezil. Family reports no confusion at baseline prior to admission. Sundowning prior to arrest. HLD - on statin. Asthma - singular, ventolin at home. Trigeminal Neuralgia - on carbamazepine. ICU primary. We appreciate your care of our pt. We will continue to follow.  
 
Arleen Diez MD 
[facility] PGY-1

## 2020-01-27 NOTE — PROGRESS NOTES
[facility] [place] [facility] Progress Note Patient: Cele Robles MRN: [id number] SSN: [id number]  YOB: 1933 Age: 80 y.o. Sex: female Admit Date: 1/16/2020 LOS: 11 days Chief Complaint Patient presents with  Shortness of Breath  Irregular Heart Beat RVR Subjective: This AM pt intubated and sedated in the ICU. FIO2 40%. Off pressors. Family awaiting repeat ECHO results w/ palliative meeting planned for 3pm this afternoon. ROS - not done to due to Pt conditions. Objective:  
 
Visit Vitals BP (!) 115/38 Pulse 73 Temp 98.9 °F (37.2 °C) Resp 19 Ht 4' 10\" (1.473 m) Wt 73.1 kg (161 lb 2.5 oz) SpO2 99% Breastfeeding No  
BMI 33.68 kg/m² Physical Exam:  
General appearance: intubated and sedated, awakes to voice Lungs: diffuse crackles throughout Heart: RRR w/no murmurs appreciated Abdomen: soft, non-tender. Bowel sounds+ Pulses: 2+ and symmetric Ext: b/l edema in hands w/soft restraints in place : harrell in situ collection system empty this AM 
 
Intake and Output: 
Current Shift: No intake/output data recorded. Last three shifts: 01/25 1901 - 01/27 0700 In: 3016.4 [I.V.:897.4] Out: 445 [Urine:445] Lab/Data Review: 
Recent Results (from the past 12 hour(s)) GLUCOSE, POC Collection Time: 01/26/20 11:33 PM  
Result Value Ref Range Glucose (POC) 99 70 - 110 mg/dL METABOLIC PANEL, BASIC Collection Time: 01/27/20  4:30 AM  
Result Value Ref Range Sodium 136 136 - 145 mmol/L Potassium 3.1 (L) 3.5 - 5.5 mmol/L Chloride 99 (L) 100 - 111 mmol/L  
 CO2 24 21 - 32 mmol/L Anion gap 13 3.0 - 18 mmol/L Glucose 85 74 - 99 mg/dL BUN 88 (H) 7.0 - 18 MG/DL Creatinine 5.71 (H) 0.6 - 1.3 MG/DL  
 BUN/Creatinine ratio 15 12 - 20 GFR est AA 9 (L) >60 ml/min/1.73m2 GFR est non-AA 7 (L) >60 ml/min/1.73m2 Calcium 7.7 (L) 8.5 - 10.1 MG/DL MAGNESIUM  Collection Time: 01/27/20  4:30 AM  
 Result Value Ref Range Magnesium 1.9 1.6 - 2.6 mg/dL CBC WITH AUTOMATED DIFF Collection Time: 01/27/20  4:30 AM  
Result Value Ref Range WBC 13.4 (H) 4.6 - 13.2 K/uL  
 RBC 2.79 (L) 4.20 - 5.30 M/uL HGB 8.7 (L) 12.0 - 16.0 g/dL HCT 26.2 (L) 35.0 - 45.0 % MCV 93.9 74.0 - 97.0 FL  
 MCH 31.2 24.0 - 34.0 PG  
 MCHC 33.2 31.0 - 37.0 g/dL  
 RDW 15.1 (H) 11.6 - 14.5 % PLATELET 196 012 - 134 K/uL MPV 11.4 9.2 - 11.8 FL  
 NEUTROPHILS 83 (H) 40 - 73 % LYMPHOCYTES 9 (L) 21 - 52 % MONOCYTES 7 3 - 10 % EOSINOPHILS 1 0 - 5 % BASOPHILS 0 0 - 2 %  
 ABS. NEUTROPHILS 11.1 (H) 1.8 - 8.0 K/UL  
 ABS. LYMPHOCYTES 1.2 0.9 - 3.6 K/UL  
 ABS. MONOCYTES 0.9 0.05 - 1.2 K/UL  
 ABS. EOSINOPHILS 0.2 0.0 - 0.4 K/UL  
 ABS. BASOPHILS 0.0 0.0 - 0.1 K/UL  
 DF AUTOMATED    
POC G3 Collection Time: 01/27/20  5:13 AM  
Result Value Ref Range Device: VENT    
 FIO2 (POC) 40 % pH (POC) 7.343 (L) 7.35 - 7.45    
 pCO2 (POC) 42.5 35.0 - 45.0 MMHG  
 pO2 (POC) 75 (L) 80 - 100 MMHG  
 HCO3 (POC) 23.1 22 - 26 MMOL/L  
 sO2 (POC) 94 92 - 97 % Base deficit (POC) 3 mmol/L Mode ASSIST CONTROL Tidal volume 300 ml Set Rate 18 bpm  
 PEEP/CPAP (POC) 5 cmH2O Allens test (POC) YES Inspiratory Time 1 sec Total resp. rate 19 Site RIGHT RADIAL Patient temp. 98.6 Specimen type (POC) ARTERIAL Performed by Sheryle Sage Volume control plus YES    
GLUCOSE, POC Collection Time: 01/27/20  5:55 AM  
Result Value Ref Range Glucose (POC) 227 (H) 70 - 110 mg/dL Assessment and Plan:  
 
80 y.o. female with PMH significant for dementia, HLD, HTN, SLE now admitted with CAP and new onset Afib w/ RVR s/p cardiac arrest x3. Now in ICU. ICU Primary Daily - follow up w/family regarding goals of care. Family to decide if they want to pursue temp dialysis. Palliative meeting 3pm today.  
 
S/p Cardiac Arrest  
 Pt admitted for sepsis 2/2 CAP being tx with IV azithro and rocephin. Bcx (1/16) neg. Cardiac arrest x3 (1/21). Intubated and sedated  in ICU. Pt code status changed to DNR per family. Labs suggesting septic shock (Wcc 33, LA 6.3), on zosyn. CXR (1/21) showing multifocal airspace disease which could represent either pulmonary edema or multifocal pneumonia. Etiology of arrest likely resp arrest 2/2 to aspiration. Pt now off pressors. A-fib w/RVR - afib w RVR on admission likely 2/2 to underlying PNA. New diagnosis for PT. On dilt drip then transitioned to PO and metoprolol. Following arrest rates up to 190 (1/22) leading to decomp. Rates controlled in 76s today. Cardiomyopathy - ECHO on admission of (1/17) EF of 60-65%, now with severe systolic dysfunction EF 78-18%. Unknown if secondary to cardiac arrest or causal in nature. Pt remains in NSR w/ rates controlled on amiodarone drip. Cardiology following. Repeat ECHO today to assess cardiac fxn. JENNIE - Nephrology consulted (1/24), suspect ATN 2/2 cardiac arrest. Going to tx with diuretics and albumin. Cr still rising w/ very little UOP. Family to decide if they wish to pursue temporary dialysis. Stated that pt would not want dialysis. Potential UTI on admission - asymtpomatic H/o HTN now hypotensive- on amlodipine and losartan at home. Needed pressors following arrest, now d/c  
H/o Dementia - on donepezil. Family reports no confusion at baseline prior to admission. Sundowning prior to arrest. HLD - on statin. Asthma - singular, ventolin at home. Trigeminal Neuralgia - on carbamazepine. ICU primary. We appreciate your care of our pt. We will continue to follow.  
 
Sam Mann MD 
[facility] P[facility] PGY-1

## 2020-01-27 NOTE — PROGRESS NOTES
attended the interdisciplinary rounds for Parker Haji, who is a 80 y.o.,female. Patients Primary Language is: [place]. According to the patients EMR [place] Affiliation is: [facility]. The reason the Patient came to the hospital is:  
Patient Active Problem List  
 Diagnosis Date Noted  CAP (community acquired pneumonia) 01/17/2020  Sepsis due to pneumonia ([facility] [address] 01/17/2020  Hyponatremia 01/16/2020  A-fib ([facility] 01/16/2020  Pneumonia 01/16/2020  Dyslipidemia 08/07/2018  
 HTN (hypertension) 07/18/2018  Trigeminal neuralgia 07/18/2018  SLE (systemic lupus erythematosus) ([facility] [address] [address] 07/18/2018  Syncope 07/17/2018 Plan: 
Chaplains will continue to follow and will provide pastoral care on an as needed/requested basis.  recommends bedside caregivers page  on duty if patient shows signs of acute spiritual or emotional distress. [address] [facility] [email or web address] Board Certified [employer] [facility]  
[phone]

## 2020-01-27 NOTE — ROUTINE PROCESS
Bedside and Verbal shift change report given to Allie Grimes RN (oncoming nurse) by Cheryle Magnus, RN (offgoing nurse). Report included the following information SBAR, Kardex, Intake/Output, MAR, Recent Results, Med Rec Status and Cardiac Rhythm Sinus Rhythm.

## 2020-01-27 NOTE — PROGRESS NOTES
[facility]  -  Progress Note Patient: Gallo Lakhani MRN: [id number]  SSN: [id number] YOB: 1933  Age: 80 y.o. Sex: female Admit Date: 1/16/2020 Assessment:  
 
Hospital Problems  Date Reviewed: 6/6/2019 Codes Class Noted POA  
 CAP (community acquired pneumonia) ICD-10-CM: J18.9 ICD-9-CM: 090  1/17/2020 Unknown * (Principal) Sepsis due to pneumonia [facility]) ICD-10-CM: J18.9, A41.9 ICD-9-CM: 468, 995.91  1/17/2020 Unknown Hyponatremia ICD-10-CM: E87.1 ICD-9-CM: 276.1  1/16/2020 Unknown A-fib [facility]) ICD-10-CM: I48.91 
ICD-9-CM: 427.31  1/16/2020 Unknown Pneumonia ICD-10-CM: J18.9 ICD-9-CM: 416  1/16/2020 Unknown  
   
  
 
-S/p code blue arrest 1/21/2020. Like primary pulmonary process with aspiration resulting in bradycardic pulseless arrest. Patient is now intubated. -Sepsis likely secondary to Community Acquired Pneumonia along with [address] with EF now 25-30% s/p arrest with large anteroapical wall motion normality consistent with Takotsubo syndrome (EF 60-65% 1/17/2020). -Atrial fibrillation with RVR. New diagnosis. Likely driven by underlying pulmonary process. Afib RVR with hypotension requiring emergent cardioversion 1/22/2020. Candidacy for long term [address] [place] [address] Road was being discussed prior to arrest, patient was on lovenox full dose which has now been stopped. -H/o syncope/bradycardia while on BB 7/2018. Patient underwent a pharmacologic nuclear stress test, echo and a 30-day event monitor at that time, all of which were normal.  
-Hypertension. Stable. -Hyperlipidemia. On statin. -SL with lupus nephritis. 
-Chronic HAJI. -Chronic pain, fibromyalgia. 
-Anxiety, depression, current delirium? 
-Normal EF 60-65% by echo this admission. 
-Patient is now DNR. 
  
Primary cardiologist Dr. Letty Kern. Plan:  
 
She is having worsening renal function and little to no output. Will defer to nephrology for volume management Family deciding to not pursue RRT at this juncture Continue with all other supportive care. Subjective:  
 
Remains intubated and sedated Her renal indices have worsened Objective:  
  
Patient Vitals for the past 8 hrs: 
 Temp Pulse Resp BP SpO2  
01/27/20 0852  73 19  99 % 01/27/20 0800 98.9 °F (37.2 °C)      
01/27/20 0700  72 16 (!) 115/38 99 % 01/27/20 0600  72 19 (!) 117/37 99 % 01/27/20 0539  71  112/49   
01/27/20 0500  70 20 112/49 98 % 01/27/20 0400 98.1 °F (36.7 °C) 69 17 119/42 98 % 01/27/20 0318  73 20  93 % 01/27/20 0300  69 19 116/43 99 % 01/27/20 0200  70 18 120/40 98 % Patient Vitals for the past 96 hrs: 
 Weight  
01/26/20 0400 73.1 kg (161 lb 2.5 oz) 01/25/20 0223 72.5 kg (159 lb 13.3 oz) Intake/Output Summary (Last 24 hours) at 1/27/2020 9506 Last data filed at 1/27/2020 0700 Gross per 24 hour Intake 1804.6 ml Output 262 ml Net 1542.6 ml Physical Exam: 
General:  no distress, appears stated age, intubated and sedated Neck:  no JVD Lungs:  clear to auscultation bilaterally Heart:  regular rate and rhythm, S1, S2 normal, no murmur, click, rub or gallop Extremities:  extremities normal, atraumatic, no cyanosis or edema Data Review:  
 
Labs: Results:  
   
Chemistry Recent Labs  
  01/27/20 
0430 01/26/20 
0340 01/25/20 
0530 01/24/20 
1955 GLU 85 96 153* 157*  138 136 136  
K 3.1* 3.1* 3.4* 4.0  
CL 99* 100 100 100 CO2 24 24 24 27 BUN 88* 74* 66* 63* CREA 5.71* 5.19* 4.51* 4.25* CA 7.7* 7.5* 7.3* 7.2*  
MG 1.9 2.0 2.0  --   
PHOS  --   --   --  4.5 AGAP 13 14 12 9 BUCR 15 14 15 15 ALB  --   --   --  1.9*  
  
CBC w/Diff Recent Labs  
  01/27/20 
0430 01/26/20 
0340 01/25/20 
0530 WBC 13.4* 7.5 12.0  
RBC 2.79* 1.92* 1.99* HGB 8.7* 6.2* 6.5* HCT 26.2* 18.3* 19.3*  
 159 170 GRANS 83* 81* 91* LYMPH 9* 11* 5* EOS 1 0 0  
  
 Cardiac Enzymes No results found for: CPK, CK, CKMMB, CKMB, RCK3, CKMBT, CKNDX, CKND1, MAYITO, TROPT, TROIQ, CARRIE, TROPT, TNIPOC, BNP, BNPP Coagulation No results for input(s): PTP, INR, APTT, INREXT in the last 72 hours. Lipid Panel No results found for: CHOL, CHOLPOCT, CHOLX, CHLST, CHOLV, 530353, HDL, HDLP, LDL, LDLC, DLDLP, 192765, VLDLC, VLDL, TGLX, TRIGL, TRIGP, TGLPOCT, CHHD, CHHDX  
BNP No results found for: BNP, BNPP, XBNPT Liver Enzymes Recent Labs  
  01/24/20 1955 ALB 1.9* Digoxin Thyroid Studies Lab Results Component Value Date/Time  TSH 2.63 01/17/2020 05:30 AM

## 2020-01-27 NOTE — CONSULTS
Palliative Medicine Consult DR. [facility]ER'[facility]: [phone] ([phone]) [facility] [phone] [facility]: [phone] Patient Name: Paola Simeon YOB: 1933 Date of Initial Consult: 1/27/2020 Reason for Consult: care decisions Requesting Provider: Christina Vivas MD 
Primary Care Physician: Chris Marcus MD 
  
 SUMMARY:  
Paola Simeon is a 80 y.o. female with a past history of HTN, HLD, syncope and a-fib who was admitted on 1/16/2020 from home with a diagnosis of community acquired pneumonia. Current medical issues leading to Palliative Medicine involvement include: advanced age, renal failure, s/p cardiac arrest with ventilator dependence and goals of care. PALLIATIVE DIAGNOSES:  
1. Advanced care decisions 2. Community acquired pneumonia 3. Acute UTI 4. Hyponatremia 5. Acute respiratory failure with intubation 6. S/p cardiac arrest X 3 
7. Acute renal failure PLAN:  
1. Advanced care decisions: Palliative care team including chaplain Nida and I met with patient's family in ICU conference room. Present was the patient's son, Jesscia Hernandez, her daughter, Roxann Randhawa and a close family friend \"daughter\", Mariela Doe. AMD on file which names her , Dieudonne Montoya, as primary healthcare decision maker. He passed away one year ago. Back up surrogate decision maker is Roxann Randhawa as 1st alternate and Jessica Hernandez as second alternate. ICU PETE Mejía presented medical background of this hospitalization. Patient has multiple co-morbidities which complicate each other: respiratory failure, cardiac arrest, renal failure and lupus. Family confirms that she would not wish to receive dialysis. AMD states life prolonging measures be withheld for a disease or illness which within a reasonable degree of medical probability is not reversible. Thus family wishes to proceed with compassionate extubation and comfort measures.  Will initiate comfort measures now with full measures after extubation planned for 11:30 Tuesday morning. GOALS OF CARE: DNR/DNI, no re-intubation after extubation, comfort measures only. POST form completed and copies placed on chart. 2. Community acquired pneumonia: C/o cough X 2 weeks and seen by PCP. Sent to ED for increasing shortness of breath and tachycardia. CXR: Interstitial pneumonitis. WBC 23. Negative influenza. IV azithromycin 3. Acute UTI: Urinalysis with moderate blood, trace leukocyte esterace 10 to 15 WBC and 4+ bacteria. IV ceftriaxone. 4. Hyponatremia: sodium 125 om admission. Managed per primary team.  
5. Acute respiratory failure with intubation: s/p cardiac arrest on 1/21/2020. Now ventilator day 7.  
6. S/p cardiac arrest:  X 3 on 1/21/2020. Likely primary pulmonary aspiration with bradycardia resulting in PEA. Now off pressors. Remains intubated as in #5.  
7. Acute renal failure: Baseline creatinine on admission 0.5. Creatinine 5.71, BUN 88 and rising with little to no urine output. Nephrology consulted. Have been managing conservatively as patient previous expressed wish that she did not want RRT of any duration. Family is honoring her wishes. 8. Initial consult note routed to primary continuity provider 9. Communicated plan of care with: Palliative IDT 
 
GOALS OF CARE: 
Patient/Health Care Proxy Stated Goals: Comfort TREATMENT PREFERENCES:  
Code Status: DNR/DNI Advance Care Planning: 
Advance Care Planning 1/27/2020 Patient's Healthcare Decision Maker is: Named in scanned ACP document Primary Decision Maker Name -  
Primary Decision Maker Phone Number -  
Primary Decision Maker Relationship to Patient - Secondary Decision Maker Name - Secondary Decision Maker Phone Number - Secondary Decision Maker Relationship to Patient -  
Confirm Advance Directive Yes, on file Patient Would Like to Complete Advance Directive -  
 Does the patient have other document types MOST/MOLST/POST/POLST Medical Interventions: Comfort measures Other Instructions: No re-intubation after extubation. Plan compassionate extubation for 1/28 at 11:30 Artificially Administered Nutrition: No feeding tube Other: As far as possible, the palliative care team has discussed with patient / health care proxy about goals of care / treatment preferences for patient. HISTORY:  
 
History obtained from: chart CHIEF COMPLAINT: non-responsive HPI/SUBJECTIVE: The patient is:  
[] Verbal and participatory [x] Non-participatory due to: sedation and intubation Clinical Pain Assessment (nonverbal scale for nonverbal patients): Clinical Pain Assessment Severity: 0 Activity (Movement): Lying quietly, normal position Duration: for how long has pt been experiencing pain (e.g., 2 days, 1 month, years) Frequency: how often pain is an issue (e.g., several times per day, once every few days, constant) FUNCTIONAL ASSESSMENT:  
 
Palliative Performance Scale (PPS): PPS: 10 
 
ECOG 
ECOG Status : Completely disabled PSYCHOSOCIAL/SPIRITUAL SCREENING:  
  
Any spiritual / Ep[employer]opalian concerns: 
[] Yes /  [x] No 
 
Caregiver Burnout: 
[] Yes /  [x] No /  [] No Caregiver Present Anticipatory grief assessment:  
[x] Normal  / [] Maladaptive REVIEW OF SYSTEMS:  
 
Positive and pertinent negative findings in ROS are noted above in HPI. The following systems were [] reviewed / [x] unable to be reviewed as noted in HPI Other findings are noted below. Systems: constitutional, ears/nose/mouth/throat, respiratory, gastrointestinal, genitourinary, musculoskeletal, integumentary, neurologic, psychiatric, endocrine. Positive findings noted below. Modified ESAS Completed by: provider Pain: 0 Stool Occurrence(s): 1 PHYSICAL EXAM:  
 
Wt Readings from Last 3 Encounters:  
01/27/20 73 kg (161 lb) 06/06/19 71.2 kg (157 lb) 10/09/18 73 kg (161 lb) Blood pressure 125/42, pulse (!) 116, temperature 98.9 °F (37.2 °C), resp. rate 22, height 4' 10\" (1.473 m), weight 73 kg (161 lb), SpO2 95 %, not currently breastfeeding. Pain: 
Pain Scale 1: Adult Nonverbal Pain Scale Pain Intensity 1: 0 Pain Onset 1: acute Pain Location 1: Back Pain Orientation 1: Posterior Pain Description 1: Aching Constitutional: Elderly, white female, intubated and in no apparent distress. ENMT: no nasal discharge, dry mucous membranes Cardiovascular: distal pulses intact, bilateral upper extremity edema. Respiratory: breathing not labored, symmetric on ventilator Gastrointestinal: soft non-tender Musculoskeletal: no deformity, no tenderness to palpation Skin: warm, dry Neurologic: not following commands, not responsive to painful stimuli HISTORY:  
 
Principal Problem: 
  Sepsis due to pneumonia ([facility] (1/17/2020) Active Problems: Hyponatremia (1/16/2020) A-fib ([facility] (1/16/2020) Pneumonia (1/16/2020) CAP (community acquired pneumonia) (1/17/2020) Past Medical History:  
Diagnosis Date  Dyslipidemia  H/O echocardiogram 07/2018 EF 65%, thickening of the mitral valve leaflets with annular calcification, no regurgitation or stenosis  Hypertension  Syncope 07/2018 No past surgical history on file. No family history on file. History reviewed, no pertinent family history. Social History Tobacco Use  Smoking status: Never Smoker  Smokeless tobacco: Never Used Substance Use Topics  Alcohol use: No  
 
Allergies Allergen Reactions  Ace Inhibitors Unknown (comments)  Chlorhexidine Other (comments) Hives and swelling on contact  Ciprofloxacin Unknown (comments)  Hibiclens [Chlorhexidine Gluconate] Rash  Lisinopril Cough  Lyrica [Pregabalin] Unknown (comments)  Macrobid [Nitrofurantoin Monohyd/M-Cryst] Unknown (comments)  Minoxidil Swelling  Neurontin [Gabapentin] Unknown (comments)  Nitrofurantoin Unknown (comments) Nerve pain  Other Medication Hives Most blood pressure meds  Sulfa (Sulfonamide Antibiotics) Unknown (comments)  Tenex [Guanfacine] Unknown (comments)  Zocor [Simvastatin] Unknown (comments) Current Facility-Administered Medications Medication Dose Route Frequency  dexmedeTOMidine (PRECEDEX) 400 mcg in 0.9% sodium chloride 100 mL infusion  0.2-0.7 mcg/kg/hr IntraVENous TITRATE  carBAMazepine (TEGretol) 100 mg/5 mL (5 mL) suspension 100 mg  100 mg Per G Tube Q12H  
 morphine injection 2 mg  2 mg IntraVENous Q15MIN PRN  
 furosemide (LASIX) injection 40 mg  40 mg IntraVENous Q6H  
 famotidine (PF) (PEPCID) 20 mg in 0.9% sodium chloride 10 mL injection  20 mg IntraVENous Q24H  
 insulin glargine (LANTUS) injection 10 Units  10 Units SubCUTAneous DAILY  insulin lispro (HUMALOG) injection   SubCUTAneous Q6H  
 glucose chewable tablet 16 g  4 Tab Oral PRN  
 glucagon (GLUCAGEN) injection 1 mg  1 mg IntraMUSCular PRN  
 dextrose (D50W) injection syrg 12.5-25 g  25-50 mL IntraVENous PRN  
 sodium chloride 3% hypertonic nebulizer soln  4 mL Nebulization QID RT  
 albuterol (PROVENTIL VENTOLIN) nebulizer solution 2.5 mg  2.5 mg Nebulization QID RT  
 chlorhexidine (PERIDEX) 0.12 % mouthwash 10 mL  10 mL Oral Q12H  
 midazolam (VERSED) injection 1-2 mg  1-2 mg IntraVENous Q10MIN PRN  
 fentaNYL citrate (PF) injection  mcg   mcg IntraVENous Q30MIN PRN  
 fentaNYL (PF) 900 mcg/30 ml infusion soln  0-200 mcg/hr IntraVENous TITRATE  albuterol-ipratropium (DUO-NEB) 2.5 MG-0.5 MG/3 ML  3 mL Nebulization Q4H PRN  
 montelukast (SINGULAIR) tablet 10 mg  10 mg Oral PRN  
 acetaminophen (TYLENOL) tablet 650 mg  650 mg Oral Q4H PRN  
 sodium chloride (NS) flush 5-10 mL  5-10 mL IntraVENous PRN  
 
 
 LAB AND IMAGING FINDINGS:  
 
Lab Results Component Value Date/Time WBC 13.4 (H) 01/27/2020 04:30 AM  
 HGB 8.7 (L) 01/27/2020 04:30 AM  
 PLATELET 001 46/00/0056 04:30 AM  
 
Lab Results Component Value Date/Time Sodium 136 01/27/2020 04:30 AM  
 Potassium 3.1 (L) 01/27/2020 04:30 AM  
 Chloride 99 (L) 01/27/2020 04:30 AM  
 CO2 24 01/27/2020 04:30 AM  
 BUN 88 (H) 01/27/2020 04:30 AM  
 Creatinine 5.71 (H) 01/27/2020 04:30 AM  
 Calcium 7.7 (L) 01/27/2020 04:30 AM  
 Magnesium 1.9 01/27/2020 04:30 AM  
 Phosphorus 4.5 01/24/2020 07:55 PM  
  
Lab Results Component Value Date/Time AST (SGOT) 33 01/16/2020 01:21 PM  
 Alk. phosphatase 130 (H) 01/16/2020 01:21 PM  
 Protein, total 6.6 01/16/2020 01:21 PM  
 Albumin 1.9 (L) 01/24/2020 07:55 PM  
 Globulin 4.4 (H) 01/16/2020 01:21 PM  
 
Lab Results Component Value Date/Time INR 1.2 01/17/2020 05:30 AM  
 Prothrombin time 14.7 01/17/2020 05:30 AM  
 aPTT 31.9 01/17/2020 05:30 AM  
  
No results found for: IRON, FE, TIBC, IBCT, PSAT, FERR No results found for: PH, PCO2, PO2 No components found for: [place] Lab Results Component Value Date/Time CK 69 01/19/2020 09:29 AM  
 CK - MB 1.7 01/19/2020 09:29 AM  
  
 
   
 
Total time: 70 minutes Counseling / coordination time, spent as noted above > 50% counseling / coordination: 60 minutes with patient, family, RN and PA Prolonged service was provided for  []30 min   []75 min in face to face time in the presence of the patient, spent as noted above. Time Start:  
Time End:  
Note: this can only be billed with 83285 (initial) or 14704 (follow up). If multiple start / stop times, list each separately.

## 2020-01-27 NOTE — PROGRESS NOTES
[employer] [facility]. Pulmonary, Critical Care, and Sleep Medicine Name: Hellen Winkler MRN: [id number] : 1933 Hospital: [facility] Date: 2020  Admission Date: 2020 Chart and notes reviewed. Data reviewed. I have evaluated all findings. [x]I have reviewed the flowsheet and previous days notes. [x]The patient is unable to give any meaningful history or review of systems because the patient is: 
[x]Intubated [x]Sedated  
[]Unresponsive [x]The patient is critically ill on     
[x]Mechanical ventilation [x]Pressors []BiPAP [] Patient is a 80 y.o. female with a history of baseline dementia, HTN, HLD, SLE, trigeminal neuralgia and was admitted on 20 in afib with RVR with CAP and hyponatremia. Prior to admission patient had a cough for past 2 weeks. During hospitalization the patient was being treated for CAP with improving SIRS criteria, Bcx negative, started on rocephin and azithromycin in ED. Patient during hospital stay pt had been hemodynamically stable on antihypertensives, during the evening of 20pt started to require more O2 and was placed on NC, then around 18:48 pt went into cardiac arrest, code blue was called, and RRT achieved ROSC twice with 3 rounds of epi, final ROSC achieved at 1916. Unclear from treatment team as to why the patient had a cardiac arrest but presumably because she may have developed acute respiratory failure from possible aspiration. Transferred to ICU for higher level care.  
  
20 Patient appears comfortable this morning on exam.  no acute events overnight. Mentation: Sedated, does not respond to voice,not following commands today Respiratory/ Secretions: Ventilator, thick tan secretions Hemodynamics: HD stable Urine output: 282 ml overnight ROS:Review of systems not obtained due to patient factors. Events and notes from last 24 hours reviewed.  Care plan discussed on multidisciplinary rounds. Patient Active Problem List  
Diagnosis Code  Syncope R55  
 HTN (hypertension) I10  
 Trigeminal neuralgia G50.0  SLE (systemic lupus erythematosus) ([facility]) M32.9  Dyslipidemia E78.5  Hyponatremia E87.1  A-fib ([facility]) I48.91  
 Pneumonia J18.9  
 CAP (community acquired pneumonia) J18.9  Sepsis due to pneumonia ([facility]) J18.9, A41.9 Vital Signs: 
Visit Vitals /42 Pulse (!) 116 Temp 98.9 °F (37.2 °C) Resp 17 Ht 4' 10\" (1.473 m) Wt 73 kg (161 lb) SpO2 95% Breastfeeding No  
BMI 33.65 kg/m² O2 Device: Ventilator, Endotracheal tube, Heated, Humidifier O2 Flow Rate (L/min): 4 l/min Temp (24hrs), Av.2 °F (36.8 °C), Min:97.4 °F (36.3 °C), Max:98.9 °F (37.2 °C) Intake/Output:  
Last shift:      No intake/output data recorded. Last 3 shifts: 1901 -  0700 In: 3016.4 [I.V.:897.4] Out: 445 [Urine:445] Intake/Output Summary (Last 24 hours) at 2020 1610 Last data filed at 2020 0700 Gross per 24 hour Intake 1565.1 ml Output 197 ml Net 1368.1 ml  
  
 
Ventilator Settings: 
Ventilator Mode: Assist control, VC+ Respiratory Rate Back-Up Rate: 18 Insp Time (sec): 1 sec I:E Ratio: 1;2.3 Ventilator Volumes Vt Set (ml): 300 ml Vt Exhaled (Machine Breath) (ml): 316 ml Vt Spont (ml): 318 ml Ve Observed (l/min): 5.71 l/min Ventilator Pressures PIP Observed (cm H2O): 23 cm H2O Plateau Pressure (cm H2O): 20 cm H2O 
MAP (cm H2O): 11 PEEP/VENT (cm H2O): 5 cm H20 Auto PEEP Observed (cm H2O): 0.9 cm H2O Current Facility-Administered Medications Medication Dose Route Frequency  dexmedeTOMidine (PRECEDEX) 400 mcg in 0.9% sodium chloride 100 mL infusion  0.2-0.7 mcg/kg/hr IntraVENous TITRATE  carBAMazepine (TEGretol) 100 mg/5 mL (5 mL) suspension 100 mg  100 mg Per G Tube Q12H  furosemide (LASIX) injection 40 mg  40 mg IntraVENous Q6H  
  famotidine (PF) (PEPCID) 20 mg in 0.9% sodium chloride 10 mL injection  20 mg IntraVENous Q24H  
 insulin glargine (LANTUS) injection 10 Units  10 Units SubCUTAneous DAILY  insulin lispro (HUMALOG) injection   SubCUTAneous Q6H  
 sodium chloride 3% hypertonic nebulizer soln  4 mL Nebulization QID RT  
 albuterol (PROVENTIL VENTOLIN) nebulizer solution 2.5 mg  2.5 mg Nebulization QID RT  
 chlorhexidine (PERIDEX) 0.12 % mouthwash 10 mL  10 mL Oral Q12H  
 fentaNYL (PF) 900 mcg/30 ml infusion soln  0-200 mcg/hr IntraVENous TITRATE  midazolam in normal saline (VERSED) 2 mg/mL infusion  1-10 mg/hr IntraVENous TITRATE Telemetry: Sinus rhythm Physical Exam:  
 General: sedated on ventilator, not responsive today HEENT: mucous membranes moist 
 Lungs: symmetric air entry but diffusely coarse breath sounds Heart: Regular rate and rhythm,  S1, S2 present Abdomen:  No tenderness, masses, organomegaly or guarding Extremity: diffuse anasarca. Moderate edema in hands b/l 
 Skin: Skin color, texture, turgor normal. No rashes or lesions DATA: 
MAR reviewed and pertinent medications noted or modified as needed Labs: 
Recent Labs  
  01/27/20 
0430 01/26/20 
0340 01/25/20 
0530 WBC 13.4* 7.5 12.0 HGB 8.7* 6.2* 6.5* HCT 26.2* 18.3* 19.3*  
 159 170 Recent Labs  
  01/27/20 
0430 01/26/20 
0340 01/25/20 
0530 01/24/20 
1955  138 136 136  
K 3.1* 3.1* 3.4* 4.0  
CL 99* 100 100 100 CO2 24 24 24 27 GLU 85 96 153* 157* BUN 88* 74* 66* 63* CREA 5.71* 5.19* 4.51* 4.25* CA 7.7* 7.5* 7.3* 7.2*  
MG 1.9 2.0 2.0  --   
PHOS  --   --   --  4.5 ALB  --   --   --  1.9* No results for input(s): PH, PCO2, PO2, HCO3, FIO2 in the last 72 hours. Recent Labs  
  01/27/20 
0513 01/26/20 
0428 01/25/20 
0402 FIO2I 40 40 40 HCO3I 23.1 26.0 28.1*  
PCO2I 42.5 41.6 50.1* PHI 7.343* 7.404 7. [facility] PO2I 75* 77* 70* Imaging: [x]   I have personally reviewed the patients radiographs and reports XR Results (most recent): 
Results from Hospital Encounter encounter on 01/16/20 XR CHEST PORT Narrative Portable Chest 
 
CPT CODE: 10423 HISTORY: Intubation. FINDINGS:  
 
Comparison with 1/25/2020 Multiple wires overlie the patient. Endotracheal tube tip is approximately 3.7 
cm above the rich. Enteric tube courses below the diaphragm tip not 
visualized. Haziness of the lung bases right greater than left. Indistinctness 
of the pulmonary vessels. No definite pneumothorax. Impression IMPRESSION: 
 
Grossly unchanged pulmonary edema with bilateral effusions right greater than 
left. Endotracheal tube tip is above the rich CT Results (most recent): 
Results from Hospital Encounter encounter on 07/17/18 CT HEAD WO CONT Narrative CT HEAD WITHOUT IV CONTRAST INDICATION: Syncope/fainting. COMPARISON: CT head 2/27/2018. TECHNIQUE: Serial axial CT images were obtained from the skull vertex to foramen 
magnum without IV contrast. All CT scans at this facility are performed using 
dose optimization technique as appropriate to a performed exam, to include 
automated exposure control, adjustment of the mA and/or kV according to 
patient's size (including appropriate matching for site-specific examinations), 
or use of iterative reconstruction technique. FINDINGS: 
Visualized paranasal sinuses are free from significant mucosal disease. Jodi Lewis Buck-white matter differentiation appears preserved. Moderate 
periventricular/cerebral white matter hypoattenuation, grossly unchanged. .No 
evidence for acute intracranial hemorrhage, acute infarct, or mass lesion. No 
evidence for hydrocephalus. . The calvarium appears intact. Impression IMPRESSION: 
No evidence for acute intracranial process. Jodi Lewis Moderate white matter disease, presumed chronic ischemic. A preliminary reading was placed in PACS by Dr. Marcia Gutierrez at [phone] hours 7/18/2018. IMPRESSION:  
· Cardiac arrest x3 - Code blue on the floor, most likely triggered by respiratory failure with volume overload or possible aspiration pneumonia. · Acute hypoxic respiratory failure requiring intubation and mechanical ventilation · Severe sepsis with septic shock- likely 2/2 pneumonia. Decreasing fever and leukocytosis · Atrial fibrillation with RVR- cardioverted 1/22 and has been in NSR since · Lactic acidosis- resolved · Hyperglycemia - No hx of DM · Encephalopathy- toxic/metabolic vs anoxic. Despite long arrest, patient is rousable, alert, and following commands · Cardiomyopathy, new onset: TTE post cardiac arrest shows LVEF of 25%, previously  was 55%. unclear if this precipitated cardiac arrest 
· JENNIE- likely ischemic ATN- renal indices continue to worsen · HTN  
· HLD · Anemia- no active bleeding · Hyponatremia- resolved · Electrolytes- hypokalemia, hypomagnesemia on replacement · Trigeminal Neuralgia - on Carbamezapine · UTI no initial cultures sent - unlikely source of sepsis from initial UA.   
   
  
 
Patient Active Problem List  
Diagnosis Code  Syncope R55  
 HTN (hypertension) I10  
 Trigeminal neuralgia G50.0  SLE (systemic lupus erythematosus) ([facility]) M32.9  Dyslipidemia E78.5  Hyponatremia E87.1  A-fib ([facility]) I48.91  
 Pneumonia J18.9  
 CAP (community acquired pneumonia) J18.9  Sepsis due to pneumonia ([facility]) J18.9, A41.9 RECOMMENDATIONS:  
· Resp: continue vent support. Titrate FiO2/ supp O2 for SpO2 >90%; VAP bundle Wean vent as tolerated. · I/D: WBCs downtrending today. Afebrile overnight. Continue Vanc/Zosyn. Cultures negative to date. CXR unchanged with bilateral opacities and moderate bilateral pleural effusions · Hem/Onc: Daily CBC. Monitor for signs of active bleeding. Transfuse for hbg <7. · CVS: NSR on amio gtt. Titrate vasopressors, aim MAP >65mmHg, will try to wean. Cardiology following- recommend continuing amio drip, start BB when BP can tolerate · Metabolic: Daily BMP; monitor e-lytes; replace PRN. Continue SSI · Renal: Trend Renal indices. Cr continues to increase, minimal urine output. Nephrology on board Patient's family is very clear that patient would not want dialysis · Endocrine: POC Glucose q6; glycemic control as needed with correctional scale insulin · GI: SUP Protonix daily, TF with goal of 40/hr- advanced · Musc/Skin: No acute issues, wound care as needed · Neuro: Fentanyl  and versed gtt for sedation for RAAS 0 to -1. Continue Carbamezapine for Trigeminal neuralgia. Monitor neuro status closely, patient able to follow commands today. · Code Status: DNR - After ROSC was achieved during code blue, Family wanted emergent intubation . According to Dr. Johnson Palm note on 1/21/20 she discussed with the family and they changed her code status from FULL to DNR. Palliative care consulted to discuss goals of care with family per request.  
· Palliative mtg today. Possible compassionate extubation tomorrow (1/28/2020) Best practice : 
 
Glycemic control IHI ICU bundles:  
 Central Line Bundle Followed , Harrell Bundle Followed and Vent Bundle Followed, Vent Day [facility] Vent patients- VAP bundle, aim to keep peak plateau pressure 31-96FI H2O Sress ulcer prophylaxis. DVT prophylaxis. Need for Lines, harrell assessed. Restraints need. Palliative care evaluation. [x]       Pt is at high risk for further organ failure and dysfunction.   
 
 
Kamari Bradley MD  
[facility] PGY-1

## 2020-01-27 NOTE — PROGRESS NOTES
Problem: Falls - Risk of 
Goal: *Absence of Falls Description Document Jerome Hagan Fall Risk and appropriate interventions in the flowsheet. Outcome: Progressing Towards Goal 
Note: Fall Risk Interventions: 
Mobility Interventions: Strengthening exercises (ROM-active/passive), Bed/chair exit alarm Mentation Interventions: Adequate sleep, hydration, pain control, Bed/chair exit alarm, Door open when patient unattended, Room close to nurse's station, Toileting rounds, Evaluate medications/consider consulting pharmacy Medication Interventions: Bed/chair exit alarm, Evaluate medications/consider consulting pharmacy Elimination Interventions: Toileting schedule/hourly rounds Problem: Patient Education: Go to Patient Education Activity Goal: Patient/Family Education Outcome: Progressing Towards Goal 
  
Problem: Pressure Injury - Risk of 
Goal: *Prevention of pressure injury Description Document Tristen Scale and appropriate interventions in the flowsheet. Outcome: Progressing Towards Goal 
Note: Pressure Injury Interventions: 
Sensory Interventions: Assess changes in LOC, Avoid rigorous massage over bony prominences, Keep linens dry and wrinkle-free, Minimize linen layers, Turn and reposition approx. every two hours (pillows and wedges if needed), Use 30-degree side-lying position Moisture Interventions: Absorbent underpads, Apply protective barrier, creams and emollients, Check for incontinence Q2 hours and as needed, Internal/External urinary devices, Maintain skin hydration (lotion/cream), Minimize layers Activity Interventions: Pressure redistribution bed/mattress(bed type) Mobility Interventions: Turn and reposition approx. every two hours(pillow and wedges), Pressure redistribution bed/mattress (bed type), HOB 30 degrees or less Nutrition Interventions: Document food/fluid/supplement intake, Discuss nutritional consult with provider Friction and Shear Interventions: Apply protective barrier, creams and emollients, HOB 30 degrees or less, Lift team/patient mobility team, Minimize layers, Transferring/repositioning devices Problem: Patient Education: Go to Patient Education Activity Goal: Patient/Family Education Outcome: Progressing Towards Goal 
  
Problem: Pneumonia: Discharge Outcomes Goal: *Demonstrates progressive activity Outcome: Progressing Towards Goal 
Goal: *Describes follow-up/return visits to physicians Outcome: Progressing Towards Goal 
Goal: *Tolerating diet Outcome: Progressing Towards Goal 
Goal: *Verbalizes name, dosage, time, side effects, and number of days to continue medications Outcome: Progressing Towards Goal 
Goal: *Influenza immunization Outcome: Progressing Towards Goal 
Goal: *Pneumococcal immunization Outcome: Progressing Towards Goal 
Goal: *Respiratory status at baseline Outcome: Progressing Towards Goal 
Goal: *Vital signs within defined limits Outcome: Progressing Towards Goal 
Goal: *Describes available resources and support systems Outcome: Progressing Towards Goal 
Goal: *Optimal pain control at patient's stated goal 
Outcome: Progressing Towards Goal 
  
Problem: Patient Education: Go to Patient Education Activity Goal: Patient/Family Education Outcome: Progressing Towards Goal 
  
Problem: Ventilator Management Goal: *Adequate oxygenation and ventilation Outcome: Progressing Towards Goal 
Goal: *Patient maintains clear airway/free of aspiration Outcome: Progressing Towards Goal 
Goal: *Absence of infection signs and symptoms Outcome: Progressing Towards Goal 
Goal: *Normal spontaneous ventilation Outcome: Progressing Towards Goal 
  
Problem: Patient Education: Go to Patient Education Activity Goal: Patient/Family Education Outcome: Progressing Towards Goal 
  
Problem: Non-Violent Restraints Goal: *Removal from restraints as soon as assessed to be safe Outcome: Progressing Towards Goal 
Goal: *No harm/injury to patient while restraints in use Outcome: Progressing Towards Goal 
Goal: *Patient's dignity will be maintained Outcome: Progressing Towards Goal 
Goal: *Patient Specific Goal (EDIT GOAL, INSERT TEXT) Outcome: Progressing Towards Goal 
Goal: Non-violent Restaints:Standard Interventions Outcome: Progressing Towards Goal 
Goal: Non-violent Restraints:Patient Interventions Outcome: Progressing Towards Goal 
Goal: Patient/Family Education Outcome: Progressing Towards Goal 
  
Problem: Nutrition Deficit Goal: *Optimize nutritional status Outcome: Progressing Towards Goal 
  
Problem: Pain Goal: *Control of Pain Outcome: Progressing Towards Goal 
Goal: *PALLIATIVE CARE:  Alleviation of Pain Outcome: Progressing Towards Goal 
  
Problem: Breathing Pattern - Ineffective Goal: *Use of effective breathing techniques Outcome: Progressing Towards Goal 
  
Problem: Pain Goal: *Control of acute pain Outcome: Progressing Towards Goal 
  
Problem: Pressure Injury - Risk of 
Goal: *Prevention of pressure injury Outcome: Progressing Towards Goal 
  
Problem: Grieving Goal: *Able to express feelings of grief Outcome: Progressing Towards Goal 
Goal: *Able to identify stages of grieving process Outcome: Progressing Towards Goal 
  
Problem: Mood - Altered Goal: *Alleviation of anxiety and depressive symptoms Outcome: Progressing Towards Goal 
  
Problem: Discharge Planning Goal: *Participates in discharge planning Outcome: Progressing Towards Goal 
  
Problem: Patient Education: Go to Patient Education Activity Goal: Patient/Family Education Outcome: Progressing Towards Goal

## 2020-01-27 NOTE — PROGRESS NOTES
Problem: Falls - Risk of 
Goal: *Absence of Falls Description Document Silvano President Fall Risk and appropriate interventions in the flowsheet. Outcome: Progressing Towards Goal 
Note: Fall Risk Interventions: 
Mobility Interventions: Bed/chair exit alarm Mentation Interventions: Bed/chair exit alarm Medication Interventions: Bed/chair exit alarm Elimination Interventions: Bed/chair exit alarm, Call light in reach, Toileting schedule/hourly rounds Problem: Pressure Injury - Risk of 
Goal: *Prevention of pressure injury Description Document Tristen Scale and appropriate interventions in the flowsheet. Outcome: Progressing Towards Goal 
Note: Pressure Injury Interventions: 
Sensory Interventions: Float heels, Minimize linen layers Moisture Interventions: Absorbent underpads Activity Interventions: Pressure redistribution bed/mattress(bed type) Mobility Interventions: Assess need for specialty bed, Pressure redistribution bed/mattress (bed type) Nutrition Interventions: Document food/fluid/supplement intake Friction and Shear Interventions: Apply protective barrier, creams and emollients, Minimize layers Problem: Pain Goal: *Control of Pain Outcome: Progressing Towards Goal 
Goal: *PALLIATIVE CARE:  Alleviation of Pain Outcome: Progressing Towards Goal 
  
Problem: Nutrition Deficit Goal: *Optimize nutritional status Outcome: Progressing Towards Goal 
  
Problem: Non-Violent Restraints Goal: *Removal from restraints as soon as assessed to be safe Outcome: Progressing Towards Goal 
Goal: *No harm/injury to patient while restraints in use Outcome: Progressing Towards Goal 
Goal: *Patient's dignity will be maintained Outcome: Progressing Towards Goal 
Goal: *Patient Specific Goal (EDIT GOAL, INSERT TEXT) Outcome: Progressing Towards Goal 
Goal: Non-violent Restaints:Standard Interventions Outcome: Progressing Towards Goal 
 Goal: Non-violent Restraints:Patient Interventions Outcome: Progressing Towards Goal 
Goal: Patient/Family Education Outcome: Progressing Towards Goal 
  
Problem: Ventilator Management Goal: *Adequate oxygenation and ventilation Outcome: Progressing Towards Goal 
Goal: *Patient maintains clear airway/free of aspiration Outcome: Progressing Towards Goal 
Goal: *Absence of infection signs and symptoms Outcome: Progressing Towards Goal 
Goal: *Normal spontaneous ventilation Outcome: Progressing Towards Goal 
  
Problem: Pneumonia: Discharge Outcomes Goal: *Demonstrates progressive activity Outcome: Progressing Towards Goal 
Goal: *Describes follow-up/return visits to physicians Outcome: Progressing Towards Goal 
Goal: *Tolerating diet Outcome: Progressing Towards Goal 
Goal: *Verbalizes name, dosage, time, side effects, and number of days to continue medications Outcome: Progressing Towards Goal 
Goal: *Influenza immunization Outcome: Progressing Towards Goal 
Goal: *Pneumococcal immunization Outcome: Progressing Towards Goal 
Goal: *Respiratory status at baseline Outcome: Progressing Towards Goal 
Goal: *Vital signs within defined limits Outcome: Progressing Towards Goal 
Goal: *Describes available resources and support systems Outcome: Progressing Towards Goal 
Goal: *Optimal pain control at patient's stated goal 
Outcome: Progressing Towards Goal

## 2020-01-27 NOTE — PROGRESS NOTES
NUTRITION Nutrition Screen RECOMMENDATIONS / PLAN:  
 
- Continue tube feeding of Vital High Protein at goal rate of 40 mL/hr with daily multivitamin and 50 mL q 4 hour water flushes.  
- Continue RD inpatient monitoring and evaluation. Goal Regimen: Vital High Protein at 40 mL/hr + 50 mL q 4 hour water flushes to provide: 960 kcal, 84 gm protein, 108 gm CHO, 0 gm fiber, 803 mL free water, 1103 mL total water, 68% RDIs NUTRITION INTERVENTIONS & DIAGNOSIS:  
 
- Enteral nutrition: continue - Vitamin and mineral supplement therapy: MVI  
- Collaboration and referral of nutrition care: interdisciplinary rounds Nutrition Diagnosis: Inadequate oral intake related to respiratory status as evidenced by pt NPO. ASSESSMENT:  
 
1/17: Tolerating feeds at goal, BM x 2 today; hypokalemia and elevated creatinine noted, trending up. MD with meeting to discuss goals of care today. 1/24: Tolerating feeds at goal. Decreased UOP. 
1/23: Tolerating feeds and advancing.  
1/22: S/p cardiac arrest and intubated. Nutritional intake adequate to meet patients estimated nutritional needs:  Yes Tube Feeding: Vital High Protein at 40 mL/hr via OGT Water Flushes: 50 mL q 4 hours Residuals: 0 mL Diet: DIET TUBE FEEDING Food Allergies: NKFA Current Appetite: Not Applicable - NPO Appetite/meal intake prior to admission: Unable to determine at this time Feeding Limitations:  [] Swallowing difficulty    [] Chewing difficulty    [x] Other: respiratory status BM: 1/27 x 2, loose Skin Integrity: WDL Edema:   [] No     [x] Yes Pertinent Medications: Reviewed: ascorbic acid, atorvastatin, cholecalciferol, famotidine, lantus (10 units), SSI, MVI, omega 3 DHA, EPA fish oil, vitamin E, furosemide Recent Labs  
  01/27/20 
0430 01/26/20 
0340 01/25/20 
0530 01/24/20 1955  138 136 136  
K 3.1* 3.1* 3.4* 4.0  
CL 99* 100 100 100 CO2 24 24 24 27 GLU 85 96 153* 157* BUN 88* 74* 66* 63* CREA 5.71* 5.19* 4.51* 4.25* CA 7.7* 7.5* 7.3* 7.2*  
MG 1.9 2.0 2.0  --   
PHOS  --   --   --  4.5 ALB  --   --   --  1.9* Intake/Output Summary (Last 24 hours) at 1/27/2020 1045 Last data filed at 1/27/2020 0700 Gross per 24 hour Intake 1797.7 ml Output 262 ml Net 1535.7 ml Anthropometrics: 
Ht Readings from Last 1 Encounters:  
01/25/20 4' 10\" (1.473 m) Last 3 Recorded Weights in this Encounter 01/22/20 1407 01/25/20 0223 01/26/20 0400 Weight: 68.5 kg (151 lb) 72.5 kg (159 lb 13.3 oz) 73.1 kg (161 lb 2.5 oz) Body mass index is 33.68 kg/m². Obese, Class I Weight History:  
Weight Metrics 1/26/2020 6/6/2019 10/9/2018 8/14/2018 8/7/2018 7/18/2018 6/20/2018 Weight 161 lb 2.5 oz 157 lb 161 lb 159 lb 159 lb 152 lb 152 lb BMI 33.68 kg/m2 32.81 kg/m2 33.65 kg/m2 33.23 kg/m2 33.23 kg/m2 31.77 kg/m2 31.77 kg/m2 Admitting Diagnosis: A-fib ([facility] [address] [I48.91] Pneumonia [J18.9] Hyponatremia [E87.1] CAP (community acquired pneumonia) [J18.9] Pertinent PMHx: dementia, HTN, HLD Education Needs:        [x] None identified  [] Identified - Not appropriate at this time  []  Identified and addressed - refer to education log Learning Limitations:   [] None identified  [x] Identified: altered mentation Cultural, Zoroastrian & ethnic food preferences:  [x] None identified    [] Identified and addressed ESTIMATED NUTRITION NEEDS:  
 
Calories: 759-966 kcal (11-14 kcal/kg) based on  [x] Actual BW 68 kg     [] IBW Protein:  gm (2-2.5 gm/kg) based on  [] Actual BW      [x] IBW 41 kg Fluid: 1 mL/kcal 
  
MONITORING & EVALUATION:  
 
Nutrition Goal(s):  
- Enteral nutrition intake will meet >75% of patient estimated nutritional needs within the next 7 days. Outcome: Met/Continue Monitoring:   [x] Food and nutrient intake   [x] Food and nutrient administration  [x] Comparative standards   [x] Nutrition-focused physical findings   [x] Anthropometric Measurements   [x] Treatment/therapy   [x] Biochemical data, medical tests, and procedures Previous Recommendations (for follow-up assessments only): Implemented Discharge Planning: Nutritional discharge needs unknown at this time. Participated in care planning, discharge planning, & interdisciplinary rounds as appropriate. Blaise Gowers, RD, [facility] Pager: [phone]

## 2020-01-27 NOTE — ACP (ADVANCE CARE PLANNING)
Family meeting with Palliative NP chaplain Natalia and Ms Buck's son Sherrie Peraza and daughter Juliana Cho and \"step daughter\" Stacy Barrientos in ICU conference room. Family brought in copy of Ms Buck's AMD naming above children as her medical decision makers, after her spouse, Wil Laughlin who  last year. Compassionate support offered as family updated about Ms Buck's medical condition with ICU PA Link Feuntes. Questions were answered and family agrees on compassionate extubation and comfort care.  at 11:30 am 
 
POST form completed indicating DNR/DNI, comfort measures and no feeding tube. Daughter Juliana Cho signs with NP. Copies of AMD and POST forms placed on chart to be scanned and original POST form given to Sarthak and two copies given to Sherrie Peraza - knowing their desire to take Ms Juleen Fabry home with hospice support if needed. Sergio Lemos Palliative  [phone]

## 2020-01-28 NOTE — ROUTINE PROCESS
Bedside shift change report given to Nancy Choudhary RN (oncoming nurse) by Gioia Leventhal, RN (offgoing nurse). Report included the following information SBAR, ED Summary, Procedure Summary, Intake/Output, Recent Results, Med Rec Status and Procedure Verification.

## 2020-01-28 NOTE — PROGRESS NOTES
[facility] Progress Note Patient: Rich Lopez MRN: [id number] SSN: [id number]  YOB: 1933 Age: 80 y.o. Sex: female Admit Date: 1/16/2020 LOS: 12 days Chief Complaint Patient presents with  Shortness of Breath  Irregular Heart Beat RVR Subjective: This AM pt intubated and sedated in the ICU. Family met with Palliative yesterday and opted comfort care. Pt extubated at 11:30am this AM. Objective:  
 
Visit Vitals /53 Pulse (!) 133 Temp 97.8 °F (36.6 °C) Resp 18 Ht 4' 10\" (1.473 m) Wt 73 kg (161 lb) SpO2 (!) 88% Breastfeeding No  
BMI 33.65 kg/m² Physical Exam:  
General appearance:sedated, does not awake to voice Lungs: diffuse crackles throughout Abdomen: soft, non-tender. Bowel sounds+ Ext: warm : harrell in situ collection system empty this AM 
 
Intake and Output: 
Current Shift: 01/28 0701 - 01/28 1900 In: 80 Out: 175 [Urine:175] Last three shifts: 01/26 1901 - 01/28 0700 In: 2070 [I.V.:455] Out: 622 [Urine:622] Lab/Data Review: 
Recent Results (from the past 12 hour(s)) GLUCOSE, POC Collection Time: 01/28/20  7:30 AM  
Result Value Ref Range Glucose (POC) 161 (H) 70 - 110 mg/dL Assessment and Plan:  
 
80 y.o. female with PMH significant for dementia, HLD, HTN, SLE now admitted with CAP and new onset Afib w/ RVR s/p cardiac arrest x3. Now in ICU. S/p extubation today, on comfort care.  
  
S/p Cardiac Arrest  
Pt admitted for sepsis 2/2 CAP being tx with IV azithro and rocephin. Bcx (1/16) neg. Cardiac arrest x3 (1/21). Intubated and sedated  in ICU. Pt code status changed to DNR per family. Labs suggesting septic shock (Wcc 33, LA 6.3), on zosyn. CXR (1/21) showing multifocal airspace disease which could represent either pulmonary edema or multifocal pneumonia. Etiology of arrest likely resp arrest 2/2 to aspiration. Pt now off pressors. A-fib w/RVR - afib w RVR on admission likely 2/2 to underlying PNA. New diagnosis for PT. On dilt drip then transitioned to PO and metoprolol. Following arrest rates up to 190 (1/22) leading to decomp. Rates controlled in 76s today. Cardiomyopathy - ECHO on admission of (1/17) EF of 60-65%, now with severe systolic dysfunction EF 41-66%. Unknown if secondary to cardiac arrest or causal in nature. Pt remains in NSR w/ rates controlled on amiodarone drip. Cardiology following. Repeat ECHO today to assess cardiac fxn. JENNIE - Nephrology consulted (1/24), suspect ATN 2/2 cardiac arrest. Going to tx with diuretics and albumin. Cr still rising w/ very little UOP. Family to decide if they wish to pursue temporary dialysis. Stated that pt would not want dialysis. 
  
Pt extubated at 11:30am today. Comfort Care now Saw Pt on rounds, lying comfortable in no obvious pain. Receiving IV ativan and IV morphine. Hospice has been consulted. Family would like Pt [facility] cared for their dad. ICU primary. We appreciate your care of our pt. We will continue to follow.   
 
Neida Brown MD 
[facility] PGY-1

## 2020-01-28 NOTE — PALLIATIVE CARE
PALLIATIVE MEDICINE NOTE 
 
 
PMT consisting of this author and Joellen Cota NP, met with Pt's son, Arely Clifford, and adopted dtr, Rito Ospina, at bedside. Pt was resting comfortably on vent and is sched for compassionate extubation today around 1130. Arely Clifford would like Hospice staff to contact him at his number, [phone], for DC planning since he plans to take Pt home if she survives extubation. He shared that the family would like to go with [facility] who cared for his dad prior to his death one year ago. Provided emotional support during this difficult time. Thank you for the consult and the opportunity to assist in Ms. Pasha Santizo care. We will cont to follow to provide support to Pt and family. Yesy Rodriguez, [facility]

## 2020-01-28 NOTE — PROGRESS NOTES
[address] [place] [address] Pulmonary Specialists. Pulmonary, Critical Care, and Sleep Medicine Name: Alvan Aase MRN: [id number] : 1933 Hospital: [address] [place] [address] Dr Date: 2020  Admission Date: 2020 Chart and notes reviewed. Data reviewed. I have evaluated all findings. [x]I have reviewed the flowsheet and previous days notes. [x]The patient is unable to give any meaningful history or review of systems because the patient is: 
[x]Intubated []Sedated [x]Unresponsive [x]The patient is critically ill on     
[x]Mechanical ventilation []Pressors []BiPAP [] Interval HPI:Patient is F 89 y.o. female with a history of baseline mild dementia, HTN, HLD, SLE, trigeminal neuralgia and was admitted on 20 in afib with RVR with CAP and hyponatremia.  Prior to admission patient had a cough for past 2 weeks.  During hospitalization the patient was being treated for CAP with improving SIRS criteria, Bcx negative, started on rocephin and azithromycin in ED.  Patient during hospital stay pt had been hemodynamically stable on antihypertensives, during the evening of 20pt started to require more O2 and was placed on NC, then around 18:48 pt went into cardiac arrest, code blue was called, and RRT achieved ROSC twice with 3 rounds of epi, final ROSC achieved at [address] from treatment team as to why the patient had a cardiac arrest but presumably because she may have developed acute respiratory failure from possible aspiration vs heart failure 2/2 sepsis and a fibb as ECHO following arrest showed EF of 20-25% . Transferred to ICU for higher level care. Her stay was complicated by presumed anoxic encephalopathy in addition to worsening renal failure with little urine out put. GOC discussed with family as family indicating patient would never want extreme measures.  Stating that HD would not align with patient's GOC so it was decided to transition patient to comfort care only with compassionate extubation. Subjective 20 Hospital Day:12 Vent Day:8 Patient is unresponsive, Appears comfortable. ROS:A comprehensive review of systems was negative except for that written in the HPI. Events and notes from last 24 hours reviewed. Care plan discussed on multidisciplinary rounds. Patient Active Problem List  
Diagnosis Code  Syncope R55  
 HTN (hypertension) I10  
 Trigeminal neuralgia G50.0  SLE (systemic lupus erythematosus) ([facility]) M32.9  Dyslipidemia E78.5  Hyponatremia E87.1  A-fib ([facility]) I48.91  
 Pneumonia J18.9  
 CAP (community acquired pneumonia) J18.9  Sepsis due to pneumonia ([facility]) J18.9, A41.9  Advanced care planning/counseling discussion Z71.89  
 Acute cystitis with hematuria N30.01  
 Acute renal failure ([facility]) N17.9  Acute respiratory failure with hypoxia ([facility]) J96.01  
 Cardiac arrest due to respiratory disorder ([facility]) J98.9, I46.8 Vital Signs: 
Visit Vitals /60 Pulse (!) 112 Temp 97.8 °F (36.6 °C) Resp 20 Ht 4' 10\" (1.473 m) Wt 73 kg (161 lb) SpO2 96% Breastfeeding No  
BMI 33.65 kg/m² O2 Device: Endotracheal tube, Ventilator O2 Flow Rate (L/min): 4 l/min Temp (24hrs), Av °F (37.2 °C), Min:97.8 °F (36.6 °C), Max:99.6 °F (37.6 °C) Intake/Output:  
Last shift:      No intake/output data recorded. Last 3 shifts: 1901 -  0700 In: 0 [I.V.:455] Out: 622 [Urine:622] Intake/Output Summary (Last 24 hours) at 2020 1079 Last data filed at 2020 0600 Gross per 24 hour Intake 863.12 ml Output 400 ml Net 463.12 ml Ventilator Settings: 
Ventilator Mode: Assist control, VC+ Respiratory Rate Back-Up Rate: 18 Insp Time (sec): 1 sec I:E Ratio: 1;2.2 Ventilator Volumes Vt Set (ml): 300 ml Vt Exhaled (Machine Breath) (ml): 300 ml Vt Spont (ml): 318 ml Ve Observed (l/min): 6.01 l/min Ventilator Pressures PIP Observed (cm H2O): 21 cm H2O Plateau Pressure (cm H2O): 18 cm H2O 
MAP (cm H2O): 11 PEEP/VENT (cm H2O): 5 cm H20 Auto PEEP Observed (cm H2O): 0.9 cm H2O Current Facility-Administered Medications Medication Dose Route Frequency  dexmedeTOMidine (PRECEDEX) 400 mcg in 0.9% sodium chloride 100 mL infusion  0.2-0.7 mcg/kg/hr IntraVENous TITRATE  carBAMazepine (TEGretol) 100 mg/5 mL (5 mL) suspension 100 mg  100 mg Per G Tube Q12H  chlorhexidine (PERIDEX) 0.12 % mouthwash 10 mL  10 mL Oral Q12H  
 fentaNYL (PF) 900 mcg/30 ml infusion soln  0-200 mcg/hr IntraVENous TITRATE Telemetry: [x]Sinus []A-flutter []Paced []A-fib []Multiple PVCs Physical Exam:  
  
General: Intubated, unresponsive HEENT:  Anicteric sclerae; pink palpebral conjunctivae; mucosa moist 
Resp:  Symmetrical chest expansion, no accessory muscle use; good airway entry;  Rales diffusely CV:  S1, S2 present; regular rate and rhythm GI:  Abdomen soft, non-tender; (+) active bowel sounds Extremities:  +2 pulses on all extremities; diffuse mild edema Skin:  Warm; no rashes/ lesions noted, normal turgor/cap refill Neurologic:  Non-focal, patient has brain stem reflexes. Withdraws. No purposeful movement. Devices:  ETT, OGT, CVL. DATA: 
MAR reviewed and pertinent medications noted or modified as needed Labs: 
Recent Labs  
  01/27/20 
0430 01/26/20 
0340 WBC 13.4* 7.5 HGB 8.7* 6.2* HCT 26.2* 18.3*  
 159 Recent Labs  
  01/27/20 
0430 01/26/20 
0340  138  
K 3.1* 3.1*  
CL 99* 100 CO2 24 24 GLU 85 96 BUN 88* 74* CREA 5.71* 5.19* CA 7.7* 7.5* MG 1.9 2.0 No results for input(s): PH, PCO2, PO2, HCO3, FIO2 in the last 72 hours. Recent Labs  
  01/28/20 
0428 01/27/20 
0513 01/26/20 
0052 FIO2I 30 40 40 HCO3I 20.2* 23.1 26.0 PCO2I 35.9 42.5 41.6 PHI 7.359 7.343* 7.404 PO2I 89 75* 77* Imaging: [x]   I have personally reviewed the patients radiographs and reports XR Results (most recent): 
Results from Hospital Encounter encounter on 01/16/20 XR CHEST PORT Narrative Portable Chest 
 
CPT CODE: 56082 HISTORY: Intubation. FINDINGS:  
 
Comparison with 1/25/2020 Multiple wires overlie the patient. Endotracheal tube tip is approximately 3.7 
cm above the rich. Enteric tube courses below the diaphragm tip not 
visualized. Haziness of the lung bases right greater than left. Indistinctness 
of the pulmonary vessels. No definite pneumothorax. Impression IMPRESSION: 
 
Grossly unchanged pulmonary edema with bilateral effusions right greater than 
left. Endotracheal tube tip is above the rich CT Results (most recent): 
Results from Hospital Encounter encounter on 07/17/18 CT HEAD WO CONT Narrative CT HEAD WITHOUT IV CONTRAST INDICATION: Syncope/fainting. COMPARISON: CT head 2/27/2018. TECHNIQUE: Serial axial CT images were obtained from the skull vertex to foramen 
magnum without IV contrast. All CT scans at this facility are performed using 
dose optimization technique as appropriate to a performed exam, to include 
automated exposure control, adjustment of the mA and/or kV according to 
patient's size (including appropriate matching for site-specific examinations), 
or use of iterative reconstruction technique. FINDINGS: 
Visualized paranasal sinuses are free from significant mucosal disease. [place] Mellow Buck-white matter differentiation appears preserved. Moderate 
periventricular/cerebral white matter hypoattenuation, grossly unchanged. .No 
evidence for acute intracranial hemorrhage, acute infarct, or mass lesion. No 
evidence for hydrocephalus. . The calvarium appears intact. Impression IMPRESSION: 
No evidence for acute intracranial process. [place] Mellow Moderate white matter disease, presumed chronic ischemic. A preliminary reading was placed in PACS by Dr. Angelica Zapata at [phone] hours 7/18/2018. IMPRESSION:  
· Cardiac arrest x3 - Code blue on the floor, most likely triggered by respiratory failure with volume overload or possible aspiration pneumonia. · Acute hypoxic respiratory failure requiring intubation and mechanical ventilation · Severe sepsis with septic shock- likely 2/2 pneumonia. Decreasing fever and leukocytosis · Atrial fibrillation with RVR- cardioverted 1/22, back in a fibb as of 1/27 with rates in 100's · Lactic acidosis- resolved · Hyperglycemia - No hx of DM · Encephalopathy- multifactorial toxic/metabolic and likely anoxic component. Patient no longer following commands or arousing. · Cardiomyopathy, new onset: TTE post cardiac arrest shows LVEF of 25%, previously  was 55%.  unclear if this precipitated cardiac arrest, improved · JENNIE- likely ischemic ATN- renal indices continue to worsen · HTN  
· HLD · Anemia- no active bleeding · Hyponatremia- resolved · Electrolytes- hypokalemia, hypomagnesemia on replacement · Trigeminal Neuralgia - on Carbamezapine Patient Active Problem List  
Diagnosis Code  Syncope R55  
 HTN (hypertension) I10  
 Trigeminal neuralgia G50.0  SLE (systemic lupus erythematosus) ([facility]) M32.9  Dyslipidemia E78.5  Hyponatremia E87.1  A-fib ([facility]) I48.91  
 Pneumonia J18.9  
 CAP (community acquired pneumonia) J18.9  Sepsis due to pneumonia ([facility]) J18.9, A41.9  Advanced care planning/counseling discussion Z71.89  
 Acute cystitis with hematuria N30.01  
 Acute renal failure ([facility]) N17.9  Acute respiratory failure with hypoxia ([facility]) J96.01  
 Cardiac arrest due to respiratory disorder ([facility]) J98.9, I46.8 RECOMMENDATIONS:  
Planning to transition to comfort care at 1130 orders placed. Patient to remain in ICU until comfortable then will transition medical floor Palliative care following DNR 
 Discussed in interdisciplinary rounds Best practice : 
 
Glycemic control IHI ICU bundles: 
 Central Line Bundle Followed , Harrell Bundle Followed and Vent Bundle Followed, Vent Day 8 [facility] Vent patients- VAP bundle, aim to keep peak plateau pressure 71-67JO H2O Sress ulcer prophylaxis. DVT prophylaxis. Need for Lines, harrell assessed. Restraints need. Palliative care evaluation. High complexity decision making was performed during this consultation and evaluation. [x]       Pt is at high risk for further organ failure and dysfunction. Randell Jacinto PA-C 
01/28/20 Pulmonary, Critical Care Medicine [facility]

## 2020-01-28 NOTE — PROGRESS NOTES
Noted patient is transitioning to comfort care. Will be available if questions arise.  
 
Gerryanderson Cassidy, PA

## 2020-01-28 NOTE — PROGRESS NOTES
PCCM Update: 
 
Family meeting. Discussed patient's critical condition and prognosis. GOC addressed. Patient in the past discussed with family and had an advanced directive stating that she would not want extreme measures. Family all agreeing she would not dialysis. GOC identified following this as transition to comfort care. Will transition 1/28 at 1130 am. No escalation of care. Family agrees with no morning labs, ABGs, CXR's, etc. See palliative note for further details. Keiko Gross PA-C 
01/27/20 Pulmonary, Critical Care Medicine [facility]

## 2020-01-28 NOTE — PROGRESS NOTES
[facility] [phone] [facility]: [phone] [facility]: [phone] Patient Name: Annie Toribio YOB: 1933 Date of Initial Consult: 1/27/2020; follow up: 1/28/2020 Reason for Consult: care decisions Requesting Provider: Tino Burks MD 
Primary Care Physician: Taina Trinidad MD 
  
 SUMMARY:  
Annie Toribio is a 80 y.o. female with a past history of HTN, HLD, syncope and a-fib who was admitted on 1/16/2020 from home with a diagnosis of community acquired pneumonia. Current medical issues leading to Palliative Medicine involvement include: advanced age, renal failure, s/p cardiac arrest with ventilator dependence and goals of care. 1/28/2020: Palliative care team including Amelia Rosario LCSW and I met with patient and her son, Anastasia Walker along with good family friend, Zeinab Falk in the room 1015 this morning. Patient was comfortable and intubated with plans for extubation ~ 11AM. Returned to room at 1545 to assess comfort and patient appears to be resting comfortably. PALLIATIVE DIAGNOSES:  
1. Advanced care decisions 2. Community acquired pneumonia 3. Acute UTI 4. Hyponatremia 5. Acute respiratory failure with intubation 6. S/p cardiac arrest X 3 
7. Acute renal failure PLAN:  
 
1/28/2020:  Discussed comfort measures with ICU PA. She stated patient needs frequent pain medication to maintain comfort. Discussed changing medication regimen to PCA morphine with scheduled IV ativan doses. Patient has required a total of 19mg IV morphine since noon today. In addition she has received a total of 3 mg IV ativan. PCA pain continuous dose of 4 mg/hour ordered with available PRN IV doses 2 mg morphine every 15 minutes with a lockout of 8 mg per hour. Ativan is scheduled 2mg every 6 hours with a PRN dose every 3 hours which can be given between scheduled doses. Continue comfort measures. Please contact hospitalist overnight if transferred to the floor and comfort measures inadequate. GOALS OF CARE: DNR/DNI, no re-intubation after extubation, comfort measures only. POST form on chart. See notes shown below:  
 
1/27/2020: 
1. Advanced care decisions: Palliative care team including chaplain Carlito and I met with patient's family in ICU conference room. Present was the patient's son, Argentina Villalba, her daughter, Mk Rucker and a close family friend \"daughter\", Krystal Durbin. AMD on file which names her , Kayleen Tovar, as primary healthcare decision maker. He passed away one year ago. Back up surrogate decision maker is Mk Rucker as 1st alternate and Argentina Villalba as second alternate. ICU PETE Mejía presented medical background of this hospitalization. Patient has multiple co-morbidities which complicate each other: respiratory failure, cardiac arrest, renal failure and lupus. Family confirms that she would not wish to receive dialysis. AMD states life prolonging measures be withheld for a disease or illness which within a reasonable degree of medical probability is not reversible. Thus family wishes to proceed with compassionate extubation and comfort measures. Will initiate comfort measures now with full measures after extubation planned for 11:30 Tuesday morning. GOALS OF CARE: DNR/DNI, no re-intubation after extubation, comfort measures only. POST form completed and copies placed on chart. 2.  Community acquired pneumonia: C/o cough X 2 weeks and seen by PCP. Sent to ED for increasing shortness of breath and tachycardia. CXR: Interstitial pneumonitis. WBC 23. Negative influenza. IV azithromycin 3. Acute UTI: Urinalysis with moderate blood, trace leukocyte esterace 10 to 15 WBC and 4+ bacteria. IV ceftriaxone. 4.  Hyponatremia: sodium 125 om admission. Managed per primary team. 
5.  Acute respiratory failure with intubation: s/p cardiac arrest on 1/21/2020. Now ventilator day 7. 6.  S/p cardiac arrest:  X 3 on 1/21/2020. Likely primary pulmonary aspiration with bradycardia resulting in PEA. Now off pressors. Remains intubated as in #5. 
7.  Acute renal failure: Baseline creatinine on admission 0.5. Creatinine 5.71, BUN 88 and rising with little to no urine output. Nephrology consulted. Have been managing conservatively as patient previous expressed wish that she did not want RRT of any duration. Family is honoring her wishes. 8.  Initial consult note routed to primary continuity provider 9. Communicated plan of care with: Palliative IDT 
 
GOALS OF CARE: 
Patient/Health Care Proxy Stated Goals: Comfort TREATMENT PREFERENCES:  
Code Status: DNR/DNI Advance Care Planning: 
Advance Care Planning 1/27/2020 Patient's Healthcare Decision Maker is: Named in scanned ACP document Primary Decision Maker Name -  
Primary Decision Maker Phone Number -  
Primary Decision Maker Relationship to Patient - Secondary Decision Maker Name - Secondary Decision Maker Phone Number - Secondary Decision Maker Relationship to Patient -  
Confirm Advance Directive Yes, on file Patient Would Like to Complete Advance Directive - Does the patient have other document types MOST/MOLST/POST/POLST Medical Interventions: Comfort measures Other Instructions: No re-intubation after extubation. Artificially Administered Nutrition: No feeding tube Other: As far as possible, the palliative care team has discussed with patient / health care proxy about goals of care / treatment preferences for patient. HISTORY:  
 
History obtained from: chart CHIEF COMPLAINT: non-responsive HPI/SUBJECTIVE: The patient is:  
[] Verbal and participatory [x] Non-participatory due to: sedation Clinical Pain Assessment (nonverbal scale for nonverbal patients): Clinical Pain Assessment Severity: 0 Activity (Movement): Lying quietly, normal position Duration: for how long has pt been experiencing pain (e.g., 2 days, 1 month, years) Frequency: how often pain is an issue (e.g., several times per day, once every few days, constant) FUNCTIONAL ASSESSMENT:  
 
Palliative Performance Scale (PPS): PPS: 10 
 
ECOG 
ECOG Status : Completely disabled PSYCHOSOCIAL/SPIRITUAL SCREENING:  
  
Any spiritual / Hinduism concerns: 
[] Yes /  [x] No 
 
Caregiver Burnout: 
[] Yes /  [x] No /  [] No Caregiver Present Anticipatory grief assessment:  
[x] Normal  / [] Maladaptive REVIEW OF SYSTEMS:  
 
Positive and pertinent negative findings in ROS are noted above in HPI. The following systems were [] reviewed / [x] unable to be reviewed as noted in HPI Other findings are noted below. Systems: constitutional, ears/nose/mouth/throat, respiratory, gastrointestinal, genitourinary, musculoskeletal, integumentary, neurologic, psychiatric, endocrine. Positive findings noted below. Modified ESAS Completed by: provider Pain: 0 Stool Occurrence(s): 1 PHYSICAL EXAM:  
 
Wt Readings from Last 3 Encounters:  
01/27/20 73 kg (161 lb) 06/06/19 71.2 kg (157 lb) 10/09/18 73 kg (161 lb) Blood pressure 128/65, pulse (!) 129, temperature 97.8 °F (36.6 °C), resp. rate 22, height 4' 10\" (1.473 m), weight 73 kg (161 lb), SpO2 (!) 82 %, not currently breastfeeding. Pain: 
Pain Scale 1: Adult Nonverbal Pain Scale Pain Intensity 1: 0 Pain Onset 1: acute Pain Location 1: Back Pain Orientation 1: Posterior Pain Description 1: Aching Constitutional: Elderly, white female, in no apparent distress. ENMT: no nasal discharge, dry mucous membranes Cardiovascular: distal pulses intact, bilateral upper extremity edema. Respiratory: breathing slightly labored; on nasal cannula. Gastrointestinal: soft non-tender Musculoskeletal: no deformity, no tenderness to palpation Skin: warm, dry, ecchymosis Neurologic: not following commands, not responsive to painful stimuli HISTORY:  
 
Principal Problem: 
  Sepsis due to pneumonia ([facility] [address] (1/17/2020) Active Problems: Hyponatremia (1/16/2020) A-fib ([facility] [address] (1/16/2020) Pneumonia (1/16/2020) CAP (community acquired pneumonia) (1/17/2020) Advanced care planning/counseling discussion () Acute cystitis with hematuria () Acute renal failure (HCC) () Acute respiratory failure with hypoxia (HCC) () Cardiac arrest due to respiratory disorder (HCC) () Past Medical History:  
Diagnosis Date  Dyslipidemia  H/O echocardiogram 07/2018 EF 65%, thickening of the mitral valve leaflets with annular calcification, no regurgitation or stenosis  Hypertension  Syncope 07/2018 No past surgical history on file. No family history on file. History reviewed, no pertinent family history. Social History Tobacco Use  Smoking status: Never Smoker  Smokeless tobacco: Never Used Substance Use Topics  Alcohol use: No  
 
Allergies Allergen Reactions  Ace Inhibitors Unknown (comments)  Chlorhexidine Other (comments) Hives and swelling on contact  Ciprofloxacin Unknown (comments)  Hibiclens [Chlorhexidine Gluconate] Rash  Lisinopril Cough  Lyrica [Pregabalin] Unknown (comments)  Macrobid [Nitrofurantoin Monohyd/M-Cryst] Unknown (comments)  Minoxidil Swelling  Neurontin [Gabapentin] Unknown (comments)  Nitrofurantoin Unknown (comments) Nerve pain  Other Medication Hives Most blood pressure meds  Sulfa (Sulfonamide Antibiotics) Unknown (comments)  Tenex [Guanfacine] Unknown (comments)  Zocor [Simvastatin] Unknown (comments) Current Facility-Administered Medications Medication Dose Route Frequency  scopolamine (TRANSDERM-SCOP) 1 mg over 3 days 1 Patch  1 Patch TransDERmal Q72H PRN  
  glycopyrrolate (ROBINUL) injection 0.2 mg  0.2 mg IntraVENous Q4H PRN  
 morphine (PF)  mg/30 ml   IntraVENous CONTINUOUS  
 LORazepam (ATIVAN) injection 1 mg  1 mg IntraVENous Q3H PRN  
 LORazepam (ATIVAN) injection 2 mg  2 mg IntraVENous Q6H  
 carBAMazepine (TEGretol) 100 mg/5 mL (5 mL) suspension 100 mg  100 mg Per G Tube Q12H  
 morphine injection 2 mg  2 mg IntraVENous Q15MIN PRN  
 dextrose (D50W) injection syrg 12.5-25 g  25-50 mL IntraVENous PRN  chlorhexidine (PERIDEX) 0.12 % mouthwash 10 mL  10 mL Oral Q12H  
 albuterol-ipratropium (DUO-NEB) 2.5 MG-0.5 MG/3 ML  3 mL Nebulization Q4H PRN  
 acetaminophen (TYLENOL) tablet 650 mg  650 mg Oral Q4H PRN  
 sodium chloride (NS) flush 5-10 mL  5-10 mL IntraVENous PRN  
 
 
 LAB AND IMAGING FINDINGS:  
 
Lab Results Component Value Date/Time WBC 13.4 (H) 01/27/2020 04:30 AM  
 HGB 8.7 (L) 01/27/2020 04:30 AM  
 PLATELET 568 14/31/1925 04:30 AM  
 
Lab Results Component Value Date/Time Sodium 136 01/27/2020 04:30 AM  
 Potassium 3.1 (L) 01/27/2020 04:30 AM  
 Chloride 99 (L) 01/27/2020 04:30 AM  
 CO2 24 01/27/2020 04:30 AM  
 BUN 88 (H) 01/27/2020 04:30 AM  
 Creatinine 5.71 (H) 01/27/2020 04:30 AM  
 Calcium 7.7 (L) 01/27/2020 04:30 AM  
 Magnesium 1.9 01/27/2020 04:30 AM  
 Phosphorus 4.5 01/24/2020 07:55 PM  
  
Lab Results Component Value Date/Time AST (SGOT) 33 01/16/2020 01:21 PM  
 Alk. phosphatase 130 (H) 01/16/2020 01:21 PM  
 Protein, total 6.6 01/16/2020 01:21 PM  
 Albumin 1.9 (L) 01/24/2020 07:55 PM  
 Globulin 4.4 (H) 01/16/2020 01:21 PM  
 
Lab Results Component Value Date/Time INR 1.2 01/17/2020 05:30 AM  
 Prothrombin time 14.7 01/17/2020 05:30 AM  
 aPTT 31.9 01/17/2020 05:30 AM  
  
No results found for: IRON, FE, TIBC, IBCT, PSAT, FERR No results found for: PH, PCO2, PO2 No components found for: [place] Lab Results Component Value Date/Time  CK 69 01/19/2020 09:29 AM  
 CK - MB 1.7 01/19/2020 09:29 AM  
  
 
   
 
Total time: 35 minutes Counseling / coordination time, spent as noted above > 50% counseling / coordination: 25 minutes with patient, family, RN and PA Prolonged service was provided for  []30 min   []75 min in face to face time in the presence of the patient, spent as noted above. Time Start:  
Time End:  
Note: this can only be billed with 25156 (initial) or 79671 (follow up). If multiple start / stop times, list each separately.

## 2020-01-28 NOTE — ROUTINE PROCESS
Bedside shift change report given to Kat Road (oncoming nurse) by Karishma Barnett RN 
 (offgoing nurse). Report included the following information SBAR, ED Summary, Procedure Summary, Intake/Output, MAR, Recent Results and Cardiac Rhythm AFIB.

## 2020-01-28 NOTE — DIABETES MGMT
GLYCEMIC CONTROL AND NUTRITION Assessment/Recommendations: 
blood glucose this am 161 mg/dl Continue lantus and corrective insulin coverage as ordered Already receiving very insulin resistant coverage Will continue inpatient monitoring Most recent blood glucose values: 
Results for Jayla Andersen (MRN [id number]) as of 1/28/2020 11:55 Ref. Range 1/27/2020 05:55 1/27/2020 11:06 1/27/2020 17:02 1/27/2020 23:43 1/28/2020 07:30  
GLUCOSE,FAST - POC Latest Ref Range: 70 - 110 mg/dL 227 (H) 90 135 (H) 126 (H) 161 (H) Current A1C of none % is equivalent to average blood glucose of ____mg/dl over the past 2-3 months. Current hospital diabetes medications:  
Lantus 10 units daily Lispro corrective insulin coverage every 6 hours Home diabetes medications: 
None noted Diet:   
NPO. Tube feeding Education:  ____Refer to Diabetes Education Record __x_Education not indicated at this time Eric Mackey RN CDE Ext [phone]

## 2020-01-29 NOTE — PROGRESS NOTES
[facility] Death Pronouncement Note Patient: Layo Quevedo MRN: [id number] SSN: [id number]  YOB: 1933 Age: 80 y.o. Sex: female Admission Date: 1/16/2020 12:48 PM Time of Death: 1906 Comments:  
Called to patient's bedside for apnea and pulselessness. Patient lying in bed, eyes closed, mouth open, unresponsive to voice or painful stimuli. No spontaneous respirations and chest rise absent. No palpable carotid pulse bilaterally. No heart sounds or breath sounds. Pupils fixed and dilated bilaterally. Corneal reflexes absent. Family at bedside, questions answered to their satisfaction. Death officially pronounced at 1906, 1/28/2020. Attending physician, Dr. Melida Lowery, to be notified by nursing. Elinda Councilman, MD, PGY-1  
[facility] Medicine Senior Pager: [phone] January 28, 2020, 7:13 PM

## 2020-01-29 NOTE — PROGRESS NOTES
responded to Death of  Shena Rosado, who was a 80 y.o.,female, The  provided the following Interventions: 
Provided crisis pastoral care, pastoral support and grief interventions. Offered prayers on behalf of the patient. Chart reviewed. Plan: 
Chaplains will continue to follow and will provide pastoral care on an as needed/requested basis and grief support for the family. [address] [facility] Spiritual Care  
[phone]

## 2020-01-29 NOTE — DISCHARGE SUMMARY
[address] [place] Discharge Summary Patient: Hyun Malcolm MRN: [id number]  CSN: [id number] YOB: 1933  Age: 80 y.o. Sex: female Admission Date: 1/16/2020 Discharge Date: 1/28/20 Attending: Lamar Fulton MD PCP: July Larios MD  
 
=================================================================== Reason for Admission: A-fib [facility]) [I48.91] Pneumonia [J18.9] Hyponatremia [E87.1] CAP (community acquired pneumonia) [J18.9] Discharge Diagnoses:  
Cardiac arrest x3 Acute hypoxic respiratory failure requiring intubation and mechanical ventilation Severe sepsis with septic shock likely secondary to pneumonia Afib with RVR Lactic acidosis Hyperglycemia Multifactorial toxic/ metabolic encephalopathy Cardiomyopathy JENNIE likely ischemic ATN 
HTN 
HLD Anemia Hyponatremia Trigeminal neuralgia Important notes to PCP/ follow-up studies and evaluations  
none Pending labs and studies: 
none Operative Procedures:  
none Discharge Medications:    
Current Discharge Medication List  
  
CONTINUE these medications which have NOT CHANGED Details  
multivit-min/iron/folic/lutein (CENTRUM SILVER WOMEN PO) Take  by mouth. fish oil-omega-3 fatty acids 300-500 mg cap Take  by mouth.  
  
vitamin E (AQUA GEMS) 400 unit capsule Take  by mouth daily. [place] [id number] mg-mg-million tab Take  by mouth. ascorbic acid, vitamin C, (VITAMIN C) 500 mg tablet Take  by mouth. cholecalciferol (VITAMIN D3) 2,000 unit cap capsule Take  by mouth two (2) times a day. amLODIPine (NORVASC) 5 mg tablet TK 1 T PO QD Refills: 3  
  
aspirin (ASPIRIN) 325 mg tablet Take 975 mg by mouth three (3) times daily. carBAMazepine ER (CARBATROL ER) 100 mg capsule Take 100 mg by mouth two (2) times a day. LORazepam (ATIVAN) 0.5 mg tablet Take  by mouth every eight (8) hours as needed for Anxiety. montelukast (SINGULAIR) 10 mg tablet Take 10 mg by mouth as needed. losartan (COZAAR) 100 mg tablet Take 100 mg by mouth daily. Disposition:  Consultants:   
Cardiology, nephrology Brief Hospital Course (including pertinent history and physical findings) Patient is Y 77 y.o. female with a history of baseline mild dementia, HTN, HLD, SLE, trigeminal neuralgia and was admitted on 20 in afib with RVR with CAP and hyponatremia.  Prior to admission patient had a cough for past 2 weeks.  During hospitalization the patient was being treated for CAP with improving SIRS criteria, Bcx negative, started on rocephin and azithromycin in ED.  Patient during hospital stay pt had been hemodynamically stable on antihypertensives, during the evening of 20 pt started to require more O2 and was placed on NC, then around 18:48 pt went into cardiac arrest, code blue was called, and RRT achieved ROSC twice with 3 rounds of epi, final ROSC achieved at [address] from treatment team as to why the patient had a cardiac arrest but presumably because she may have developed acute respiratory failure from possible aspiration vs heart failure 2/2 sepsis and afib as ECHO following arrest showed EF of 20-25% . Transferred to ICU for higher level care. Her stay was complicated by presumed anoxic encephalopathy in addition to worsening renal failure with little urine output. GOC discussed with family as family indicating patient would never want extreme measures. Stating that HD would not align with patient's GOC so it was decided to transition patient to comfort care only with compassionate extubation on 20. She passed away at 1906 on 20 with family at bedside. CURRENT ADMISSION IMAGING RESULTS Xr Chest [facility] Result Date: 2020 IMPRESSION: Grossly unchanged pulmonary edema with bilateral effusions right greater than left. Endotracheal tube tip is above the rich Xr Chest [facility] Result Date: 1/25/2020 IMPRESSION: Radiographically stable and unchanged cardiopulmonary status. Jodie Burden Xr Chest [facility] Result Date: 1/24/2020 Impression:  1. ETT and enteric tube are as described. 2. Similar to minimally increased bilateral interstitial and airspace edema versus infiltrate. Underlying moderate pleural effusions. Xr Chest [facility] Result Date: 1/23/2020 IMPRESSION: 1. Support devices in acceptable positions, as above. 2.  Slightly improved aeration at the right lung base. Grossly unchanged left lung consolidations. Xr Chest [facility] Result Date: 1/22/2020 IMPRESSION: Endotracheal tube tip above the rich. Bilateral airspace opacities right greater than left without significant change. Xr [facility] Result Date: 1/21/2020 Impression: 1. Endotracheal and NG tube in satisfactory position. 2.  Multifocal airspace disease which could represent either pulmonary edema or multifocal pneumonia. There are likely layering pleural effusions as well. Xr [facility] Result Date: 1/16/2020 Impression: 1. Interstitial pneumonitis. Follow-up to resolution is recommended. Cardiology Procedures/Testing: MODALITY RESULTS  
EKG Results for orders placed or performed during the hospital encounter of 01/16/20 EKG, 12 LEAD, INITIAL Result Value Ref Range Ventricular Rate 132 BPM  
 Atrial Rate 163 BPM  
 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  
leads Confirmed by Faith Johnson MD, Andrea Russell (2013) on 1/16/2020 2:44:56 PM 
  
 Results for orders placed or performed in visit on 06/06/19 AMB POC EKG ROUTINE W/ 12 LEADS, INTER & REP Impression See progress note. ECHO 01/16/20 ECHO ADULT FOLLOW-UP OR LIMITED 01/27/2020 1/27/2020 Narrative · Small left ventricle. Hypertrophy. Mild systolic dysfunction. Estimated  
left ventricular ejection fraction is 40 - 45%. Abnormal left ventricular  
wall motion. · Right Ventricle: Normal global systolic function. · Mild to moderate tricuspid valve regurgitation is present. · Mild pulmonary hypertension. Pulmonary arterial systolic pressure is 40  
mmHg. Signed by: Beka Graham MD  
  
 
Special Testing/Procedures: MODALITY RESULTS MICRO All Micro Results Procedure Component Value Units Date/Time CULTURE, MRSA [[id number]] Collected:  01/24/20 1100 Order Status:  Canceled Specimen:  Nares CULTURE, URINE [[id number]] Collected:  01/22/20 0015 Order Status:  Completed Specimen:  Urine from [place]ui Specimen Updated:  01/23/20 1103 Special Requests: NO SPECIAL REQUESTS Culture result: NO GROWTH 1 DAY     
 CULTURE, RESPIRATORY/SPUTUM/BRONCH Noreene Katina [[id number]] Collected:  01/21/20 2053 Order Status:  Completed Specimen:  Sputum from Bronch lavage right lower lobe Updated:  01/23/20 7253 Special Requests: NO SPECIAL REQUESTS     
  GRAM STAIN MANY WBC'S     
   NO ORGANISMS SEEN Culture result:    
  RARE NORMAL RESPIRATORY MILLIE  
     
 CULTURE, BLOOD [[id number]] Collected:  01/16/20 1321 Order Status:  Completed Specimen:  Blood Updated:  01/22/20 0414 Special Requests: LEFT Culture result: NO GROWTH 6 DAYS     
 CULTURE, BLOOD [[id number]] Collected:  01/16/20 1403 Order Status:  Completed Specimen:  Blood Updated:  01/22/20 3238 Special Requests: --     
  NO SPECIAL REQUESTS 
LEFT Antecubital 
  
  Culture result: NO GROWTH 6 DAYS INFLUENZA A & B AG (RAPID TEST) [[id number]] Collected:  01/16/20 1438 Order Status:  Completed Specimen:  Nasopharyngeal from Nasal washing Updated:  01/16/20 1514 Influenza A Antigen NEGATIVE Comment: A negative result does not exclude influenza virus infection, seasonal or H1N1 due to suboptimal sensitivity. If influenza is circulating in your community, a diagnosis of influenza should be considered based on a patients clinical presentation and empiric antiviral treatment should be considered, if indicated. Influenza B Antigen NEGATIVE      
  
  
ABG Lab Results Component Value Date/Time pH (POC) 7.359 01/28/2020 04:28 AM  
 pCO2 (POC) 35.9 01/28/2020 04:28 AM  
 pO2 (POC) 89 01/28/2020 04:28 AM  
 HCO3 (POC) 20.2 (L) 01/28/2020 04:28 AM  
 FIO2 (POC) 30 01/28/2020 04:28 AM  
  
 
Laboratory Results: 
LABORATORY RESULTS  
HEMATOLOGY Lab Results Component Value Date/Time WBC 13.4 (H) 01/27/2020 04:30 AM  
 HGB 8.7 (L) 01/27/2020 04:30 AM  
 HCT 26.2 (L) 01/27/2020 04:30 AM  
 PLATELET 361 34/69/5641 04:30 AM  
 MCV 93.9 01/27/2020 04:30 AM  
   
CHEMISTRIES Lab Results Component Value Date/Time Sodium 136 01/27/2020 04:30 AM  
 Potassium 3.1 (L) 01/27/2020 04:30 AM  
 Chloride 99 (L) 01/27/2020 04:30 AM  
 CO2 24 01/27/2020 04:30 AM  
 Anion gap 13 01/27/2020 04:30 AM  
 Glucose 85 01/27/2020 04:30 AM  
 BUN 88 (H) 01/27/2020 04:30 AM  
 Creatinine 5.71 (H) 01/27/2020 04:30 AM  
 BUN/Creatinine ratio 15 01/27/2020 04:30 AM  
 GFR est AA 9 (L) 01/27/2020 04:30 AM  
 GFR est non-AA 7 (L) 01/27/2020 04:30 AM  
 Calcium 7.7 (L) 01/27/2020 04:30 AM  
  
HEPATIC FUNCTION Lab Results Component Value Date/Time  Albumin 1.9 (L) 01/24/2020 07:55 PM  
 Bilirubin, total 0.3 01/16/2020 01:21 PM  
 Protein, total 6.6 01/16/2020 01:21 PM  
 Globulin 4.4 (H) 01/16/2020 01:21 PM  
 A-G Ratio 0.5 (L) 01/16/2020 01:21 PM  
 AST (SGOT) 33 01/16/2020 01:21 PM  
 ALT (SGPT) 27 01/16/2020 01:21 PM  
 Alk. phosphatase 130 (H) 01/16/2020 01:21 PM  
   
LACTIC ACID Lab Results Component Value Date/Time Lactic acid 1.1 01/23/2020 05:45 AM  
 Lactic acid 3.4 (HH) 01/22/2020 04:30 AM  
 Lactic acid 6.3 (HH) 01/21/2020 09:00 PM  
  
CARDIAC PANEL Lab Results Component Value Date/Time CK 69 01/19/2020 09:29 AM  
 CK - MB 1.7 01/19/2020 09:29 AM  
 CK-MB Index 2.5 01/19/2020 09:29 AM  
 Troponin-I, QT <0.02 01/19/2020 09:29 AM  
  
NT-proBNP No results found for: BNP, BNPP, BNPPPOC, XBNPT, BNPNT  
THYROID Lab Results Component Value Date/Time TSH 2.63 01/17/2020 05:30 AM  
 T4, Free 0.9 07/18/2018 03:05 AM  
  
LIPID PANEL No results found for: CHOL, CHOLPOCT, CHOLX, CHLST, CHOLV, HDL, HDLPOC, HDLP, LDL, LDLCPOC, LDLC, DLDLP, VLDLC, VLDL, TGLX, TRIGL, TRIGP, TGLPOCT, CHHD, CHHDX RISK CALCULATORS: 
SCORE RESULT  
ASCVD The ASCVD Risk score (Tony Argueta, et al., 2013) failed to calculate for the following reasons: 
  ASCVD risk score not calculated [id number] HAS-BLED READMISSION RISK SCORE Low Risk 6 Total Score 3 Has Seen PCP in Last 6 Months (Yes=3, No=0) 3 Patient Length of Stay (>5 days = 3)  
 5 Pt. Coverage (Medicare=5 , Medicaid, or Self-Pay=4) Criteria that do not apply:  
 . Living with Significant Other. Assisted Living. LTAC. SNF. or  
Rehab  
 IP Visits Last 12 Months (1-3=4, 4=9, >4=11) Charlson Comorbidity Score (Age + Comorbid Conditions) Point of Contact Maite Butt Relationship: daughter 
[phone] Follow-up:  
Follow-up Information None Loida Mccray MD, PGY-1  
[facility] Medicine Intern Pager: [phone] January 28, 2020, 7:16 PM

## 2020-01-29 NOTE — PROGRESS NOTES
Got paged at approximately 6:50 pm for transfer of care as patient was transitioned to comfort care and moved to the floor. Approximately 2 minutes later patient was in asystole. Signed By: Emmy Gomez MD   
 January 28, 2020

## 2020-07-17 NOTE — H&P
Intern Admission History and Physical 
[address] [facility] Patient: Paola Simeon MRN: [id number]  CSN: [id number] YOB: 1933  Age: 80 y.o. Sex: female Admission Date: 1/16/2020 HPI:  
 
Paola Simeon is a 80 y.o. female with PMH A-fib, HLD, HTN, SLE, now presenting with complaint of intractable, non-productive cough. Pt has had cough for approx 2 wks. Initially attributed cough to environmental allergies. Stated cough was productive of purulent sputum at onset but is non-productive now. Denies sore throat, dynophagia and sick contacts. ENDORSES SOB w/mild exertion and subjective fvr. Denies HA, CP, palpations, abd pain, N/V, D/C, melana, BRBPR  
 
ED Course (See objective for values/interpretations): 
Labs obtained: LA, CBC, CMP, UA, Influenza A+B Medications administered: azithromycin, ceftriaxone, diltiazem, metoprolol, albuterol Imaging obtained: CXR Review of Systems: 
General ROS: ENDORSES fever to 100.9 and weight loss of approx 10 lbs over last two wks. negative for  - chills, night sweats, weight gain Psychological ROS: negative for - anxiety and depression Ophthalmic ROS: negative for - blurry vision, decreased vision or loss of vision ENT ROS: negative for - headaches, hearing change or visual changes Hematological and Lymphatic ROS: negative for - bruising, jaundice Respiratory ROS: ENDORSES cough and SOB as above.  negative for - hemoptysis, orthopnea, paroxysmal dyspnea, or wheezing Cardiovascular ROS: negative for - chest pain, dyspnea on exertion, edema, loss of consciousness, or palpitations Gastrointestinal ROS: negative for - abdominal pain, blood in stools, change in stools, constipation, diarrhea, hematemesis, melena, nausea/vomiting or swallowing difficulty/pain Genito-Urinary ROS: negative for - dysuria, hematuria or urinary frequency/urgency Musculoskeletal ROS: negative for - joint pain, joint swelling or muscle pain Neurological ROS: negative for - dizziness, headaches, numbness/tingling or weakness Dermatological ROS: negative for - rash or skin lesion changes Past Medical History:  
Diagnosis Date  Dyslipidemia  H/O echocardiogram 07/2018 EF 65%, thickening of the mitral valve leaflets with annular calcification, no regurgitation or stenosis  Hypertension  Syncope 07/2018 No past surgical history on file. No family history on file. Social History Socioeconomic History  Marital status:  Spouse name: Not on file  Number of children: Not on file  Years of education: Not on file  Highest education level: Not on file Tobacco Use  Smoking status: Never Smoker  Smokeless tobacco: Never Used Substance and Sexual Activity  Alcohol use: No  
 
 
Allergies Allergen Reactions  Other Food Hives  Ace Inhibitors Unknown (comments)  Chlorhexidine Other (comments) Hives and swelling on contact  Ciprofloxacin Unknown (comments)  Hibiclens [Chlorhexidine Gluconate] Rash  Lisinopril Cough  Lyrica [Pregabalin] Unknown (comments)  Macrobid [Nitrofurantoin Monohyd/M-Cryst] Unknown (comments)  Minoxidil Swelling  Neurontin [Gabapentin] Unknown (comments)  Nitrofurantoin Unknown (comments) Nerve pain  Other Medication Hives Most blood pressure meds  Sulfa (Sulfonamide Antibiotics) Unknown (comments)  Tenex [Guanfacine] Unknown (comments)  Zocor [Simvastatin] Unknown (comments) Prior to Admission Medications Prescriptions Last Dose Informant Patient Reported? Taking? AZO CRANBERRY 074-49-84 mg-mg-million tab Unknown at Unknown time  Yes No  
Sig: Take  by mouth. LORazepam (ATIVAN) 0.5 mg tablet Unknown at Unknown time  Yes No  
Sig: Take  by mouth every eight (8) hours as needed for Anxiety.   
albuterol (PROVENTIL HFA, VENTOLIN HFA, PROAIR HFA) 90 mcg/actuation inhaler Unknown at Unknown time  Yes No  
 Sig: Take 2 Puffs by inhalation every six (6) hours as needed for Wheezing. amLODIPine (NORVASC) 5 mg tablet Unknown at Unknown time  Yes No  
Sig: TK 1 T PO QD  
ascorbic acid, vitamin C, (VITAMIN C) 500 mg tablet Unknown at Unknown time  Yes No  
Sig: Take  by mouth. aspirin (ASPIRIN) 325 mg tablet Unknown at Unknown time  Yes No  
Sig: Take 975 mg by mouth three (3) times daily. atorvastatin (LIPITOR) 10 mg tablet Unknown at Unknown time  Yes No  
Sig: Take 10 mg by mouth daily. carBAMazepine ER (CARBATROL ER) 100 mg capsule Unknown at Unknown time  Yes No  
Sig: Take 100 mg by mouth two (2) times a day. cholecalciferol (VITAMIN D3) 2,000 unit cap capsule Unknown at Unknown time  Yes No  
Sig: Take  by mouth two (2) times a day. fish oil-omega-3 fatty acids 300-500 mg cap Unknown at Unknown time  Yes No  
Sig: Take  by mouth.  
losartan (COZAAR) 100 mg tablet Unknown at Unknown time  Yes No  
Sig: Take 100 mg by mouth daily. montelukast (SINGULAIR) 10 mg tablet Unknown at Unknown time  Yes No  
Sig: Take 10 mg by mouth as needed. multivit-min/iron/folic/lutein (CENTRUM SILVER WOMEN PO) Unknown at Unknown time  Yes No  
Sig: Take  by mouth.  
vitamin E (AQUA GEMS) 400 unit capsule Unknown at Unknown time  Yes No  
Sig: Take  by mouth daily. Facility-Administered Medications: None Physical Exam:  
 
Patient Vitals for the past 24 hrs: 
 Temp Pulse Resp BP SpO2  
01/16/20 2300 98.3 °F (36.8 °C) (!) 121 28 (!) 154/135 93 % 01/16/20 2200  (!) 114 26 119/61 94 % 01/16/20 2150 99 °F (37.2 °C) (!) 120 30 152/71 94 % 01/16/20 2100  (!) 124 27  94 % 01/16/20 2026  (!) 125 25  91 % 01/16/20 2000  (!) 126 (!) 34 140/87 93 % 01/16/20 1930  (!) 133 27 (!) 154/96 91 % 01/16/20 1900  (!) 142 28 (!) 111/92 93 % 01/16/20 1800  (!) 138 (!) 44 130/72 92 % 01/16/20 1758  (!) 135 (!) 34 159/83 92 % 01/16/20 1730  (!) 132 (!) 42 146/79 93 % 01/16/20 1700     96 % 01/16/20 1500  (!) 140 29 149/76   
01/16/20 1419  (!) 129  (!) 144/97   
01/16/20 1400  (!) 139 (!) 41 (!) 144/97   
01/16/20 1250 98.6 °F (37 °C) (!) 141 25 (!) 133/93 95 % Physical Exam:  
General:  AAOx3, NAD HEENT: Conjunctiva pink, sclera anicteric. NC/AT, No other gross abnormalities present. CV:  RRR, no murmurs. No visible pulsations or thrills. RESP:  Unlabored breathing. Lungs w/diffuse wheezes b/l. Equal expansion bilaterally. ABD:  Soft, nontender, nondistended. BS (+). No hepatosplenomegaly. No suprapubic tenderness. MS:  No joint deformity or instability. No atrophy. Neuro:  No focal deficits. 5/5 strength bilateral upper extremities and lower extremities. Ext:  No edema. 2+ radial and dp pulses bilaterally. (-) tenderness on posterior calves b/l 
Skin:  No rashes, lesions, or ulcers. Good turgor. Chemistry Recent Labs  
  01/16/20 
1321 * * K 3.8 CL 94* CO2 21 BUN 19* CREA 0.51* CA 8.7 AGAP 10 BUCR 37* * TP 6.6 ALB 2.2*  
GLOB 4.4* AGRAT 0.5* CBC w/Diff Recent Labs  
  01/16/20 
1324 WBC 23.0*  
RBC 3.64* HGB 11.9*  
HCT 33.4*  
 GRANS 69 LYMPH 4*  
EOS 0 Liver Enzymes Protein, total  
Date Value Ref Range Status 01/16/2020 6.6 6.4 - 8.2 g/dL Final  
 
Albumin Date Value Ref Range Status 01/16/2020 2.2 (L) 3.4 - 5.0 g/dL Final  
 
Globulin Date Value Ref Range Status 01/16/2020 4.4 (H) 2.0 - 4.0 g/dL Final  
 
A-G Ratio Date Value Ref Range Status 01/16/2020 0.5 (L) 0.8 - 1.7   Final  
 
AST (SGOT) Date Value Ref Range Status 01/16/2020 33 10 - 38 U/L Final  
 
Alk. phosphatase Date Value Ref Range Status 01/16/2020 130 (H) 45 - 117 U/L Final  
 
Recent Labs  
  01/16/20 
1321 TP 6.6 ALB 2.2*  
GLOB 4.4* AGRAT 0.5* SGOT 33 * Lactic Acid No results found for: LAC No results for input(s): LAC in the last 72 hours. BNP No results found for: BNP, BNPP, XBNPT Cardiac Enzymes Lab Results Component Value Date/Time TROIQ <0.02 01/16/2020 01:24 PM  
  
 
Coagulation No results for input(s): PTP, INR, APTT, INREXT in the last 72 hours. Thyroid  Lab Results Component Value Date/Time TSH 3.94 (H) 07/18/2018 03:05 AM  
    
 
Lipid Panel No results found for: CHOL, CHOLPOCT, CHOLX, CHLST, CHOLV, 267212, HDL, HDLP, LDL, LDLC, DLDLP, 175346, VLDLC, VLDL, TGLX, TRIGL, TRIGP, TGLPOCT, CHHD, CHHDX  
 
ABG No results for input(s): PHI, PHI, POC2, PCO2I, PO2, PO2I, HCO3, HCO3I, FIO2, FIO2I in the last 72 hours. Urinalysis Lab Results Component Value Date/Time Color YELLOW 01/16/2020 02:38 PM  
 Appearance CLOUDY 01/16/2020 02:38 PM  
 Specific gravity >1.030 (H) 01/16/2020 02:38 PM  
 pH (UA) 5.5 01/16/2020 02:38 PM  
 Protein >1,000 (A) 01/16/2020 02:38 PM  
 Glucose NEGATIVE  01/16/2020 02:38 PM  
 Ketone 40 (A) 01/16/2020 02:38 PM  
 Bilirubin NEGATIVE  01/16/2020 02:38 PM  
 Urobilinogen 1.0 01/16/2020 02:38 PM  
 Nitrites NEGATIVE  01/16/2020 02:38 PM  
 Leukocyte Esterase TRACE (A) 01/16/2020 02:38 PM  
 Epithelial cells 3+ 01/16/2020 02:38 PM  
 Bacteria 4+ (A) 01/16/2020 02:38 PM  
 WBC 10 to 15 01/16/2020 02:38 PM  
 RBC 10 to 15 01/16/2020 02:38 PM  
  
 
Micro No results for input(s): SDES, CULT in the last 72 hours. No results for input(s): CULT in the last 72 hours. Imaging: 
XR (Most Recent). Results from [facility] Encounter encounter on 01/16/20 XR CHEST PORT Narrative EXAM: Chest Radiograph INDICATION:  cough TECHNIQUE: AP view of the chest 
 
COMPARISON: 7/17/2018 and 11/6/2007 FINDINGS: No pneumothorax identified. Mild interstitial infiltrates are noted. No effusions identified. The cardiomediastinal silhouette is unremarkable. The pulmonary vasculature is unremarkable. The osseous structures are 
unremarkable. Impression Impression: 1.  Interstitial pneumonitis. Follow-up to resolution is recommended. CT (Most Recent) [place] [facility] [place]/17/18 CT HEAD WO CONT Narrative CT HEAD WITHOUT IV CONTRAST INDICATION: Syncope/fainting. COMPARISON: CT head 2/27/2018. TECHNIQUE: Serial axial CT images were obtained from the skull vertex to foramen 
magnum without IV contrast. All CT scans at this facility are performed using 
dose optimization technique as appropriate to a performed exam, to include 
automated exposure control, adjustment of the mA and/or kV according to 
patient's size (including appropriate matching for site-specific examinations), 
or use of iterative reconstruction technique. FINDINGS: 
Visualized paranasal sinuses are free from significant mucosal disease. Jodi Lewis Buck-white matter differentiation appears preserved. Moderate 
periventricular/cerebral white matter hypoattenuation, grossly unchanged. .No 
evidence for acute intracranial hemorrhage, acute infarct, or mass lesion. No 
evidence for hydrocephalus. . The calvarium appears intact. Impression IMPRESSION: 
No evidence for acute intracranial process. Jodi Lewis Moderate white matter disease, presumed chronic ischemic. A preliminary reading was placed in PACS by Dr. Chad Winters at [phone] hours 7/18/2018. ECHO No results found for this or any previous visit. EKG Results for orders placed or performed in visit on 06/06/19 AMB POC EKG ROUTINE W/ 12 LEADS, INTER & REP     Status: None Impression See progress note. Recent Results (from the past 12 hour(s)) METABOLIC PANEL, COMPREHENSIVE Collection Time: 01/16/20  1:21 PM  
Result Value Ref Range Sodium 125 (L) 136 - 145 mmol/L Potassium 3.8 3.5 - 5.5 mmol/L Chloride 94 (L) 100 - 111 mmol/L  
 CO2 21 21 - 32 mmol/L Anion gap 10 3.0 - 18 mmol/L Glucose 162 (H) 74 - 99 mg/dL BUN 19 (H) 7.0 - 18 MG/DL  Creatinine 0.51 (L) 0.6 - 1.3 MG/DL  
 BUN/Creatinine ratio 37 (H) 12 - 20 GFR est AA >60 >60 ml/min/1.73m2 GFR est non-AA >60 >60 ml/min/1.73m2 Calcium 8.7 8.5 - 10.1 MG/DL Bilirubin, total 0.3 0.2 - 1.0 MG/DL  
 ALT (SGPT) 27 13 - 56 U/L  
 AST (SGOT) 33 10 - 38 U/L Alk. phosphatase 130 (H) 45 - 117 U/L Protein, total 6.6 6.4 - 8.2 g/dL Albumin 2.2 (L) 3.4 - 5.0 g/dL Globulin 4.4 (H) 2.0 - 4.0 g/dL A-G Ratio 0.5 (L) 0.8 - 1.7    
CBC WITH AUTOMATED DIFF Collection Time: 01/16/20  1:24 PM  
Result Value Ref Range WBC 23.0 (H) 4.6 - 13.2 K/uL  
 RBC 3.64 (L) 4.20 - 5.30 M/uL  
 HGB 11.9 (L) 12.0 - 16.0 g/dL HCT 33.4 (L) 35.0 - 45.0 % MCV 91.8 74.0 - 97.0 FL  
 MCH 32.7 24.0 - 34.0 PG  
 MCHC 35.6 31.0 - 37.0 g/dL  
 RDW 11.7 11.6 - 14.5 % PLATELET 482 666 - 242 K/uL MPV 9.1 (L) 9.2 - 11.8 FL  
 NEUTROPHILS 69 42 - 75 % BAND NEUTROPHILS 22 (H) 0 - 5 % LYMPHOCYTES 4 (L) 20 - 51 % MONOCYTES 4 2 - 9 % EOSINOPHILS 0 0 - 5 % BASOPHILS 0 0 - 3 % METAMYELOCYTES 1 (H) 0 %  
 ABS. NEUTROPHILS 20.9 (H) 1.8 - 8.0 K/UL  
 ABS. LYMPHOCYTES 0.9 0.8 - 3.5 K/UL  
 ABS. MONOCYTES 0.9 0 - 1.0 K/UL  
 ABS. EOSINOPHILS 0.0 0.0 - 0.4 K/UL  
 ABS. BASOPHILS 0.0 0.0 - 0.06 K/UL  
 DF MANUAL PLATELET COMMENTS ADEQUATE PLATELETS    
 RBC COMMENTS NORMOCYTIC, NORMOCHROMIC    
TROPONIN I Collection Time: 01/16/20  1:24 PM  
Result Value Ref Range Troponin-I, QT <0.02 0.0 - 0.045 NG/ML  
EKG, 12 LEAD, INITIAL Collection Time: 01/16/20  1:30 PM  
Result Value Ref Range Ventricular Rate 132 BPM  
 Atrial Rate 163 BPM  
 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  
leads Confirmed by Pavithra Louis MD, Emiliano Gant ([id number]) on 1/16/2020 2:44:56 PM 
  
POC LACTIC ACID Collection Time: 01/16/20  1:32 PM  
Result Value Ref Range Lactic Acid (POC) 0.95 0.40 - 2.00 mmol/L  
URINALYSIS W/ RFLX MICROSCOPIC Collection Time: 01/16/20  2:38 PM  
Result Value Ref Range Color YELLOW Appearance CLOUDY Specific gravity >1.030 (H) 1.005 - 1.030  
 pH (UA) 5.5 5.0 - 8.0 Protein >1,000 (A) NEG mg/dL Glucose NEGATIVE  NEG mg/dL Ketone 40 (A) NEG mg/dL Bilirubin NEGATIVE  NEG Blood MODERATE (A) NEG Urobilinogen 1.0 0.2 - 1.0 EU/dL Nitrites NEGATIVE  NEG Leukocyte Esterase TRACE (A) NEG    
INFLUENZA A & B AG (RAPID TEST) Collection Time: 01/16/20  2:38 PM  
Result Value Ref Range Influenza A Antigen NEGATIVE  NEG Influenza B Antigen NEGATIVE  NEG    
URINE MICROSCOPIC ONLY Collection Time: 01/16/20  2:38 PM  
Result Value Ref Range WBC 10 to 15 0 - 4 /hpf  
 RBC 10 to 15 0 - 5 /hpf Epithelial cells 3+ 0 - 5 /lpf Bacteria 4+ (A) NEG /hpf Assessment/Plan:  
80 y.o. female with PMH significant for A-fib, HLD, HTN, SLE now admitted with cough x2 wks. DDx to include URI (viral/bacterial), PNA, sinusitis, bronchitis, TB, malignancy. 1. Sepsis 2/2 CAP -  SIRS: , RR 44, WBC 23.0 subjective temp 100.9 w/likely source of infx: PNA. Pt w/previously productive cough x2 wks and diffuse wheezes on PE.  CXR demonstrated interstitial pnuemonitis. PLAN 
-continue azithromycin and ceftriaxone 
-f/u blood cx x2 
-continue robitussin PRN for cough 
-admit to tele 
-cardiac diet 
-VS per routine 
-Daily BMP, CBC, magnesium 
-Robitussin PRN for cough 2. A-fib w/RVR - tachy as hi as 140s w/EKG showing A-fib w/RVR. CHADSVASC of 2. Coagulated in ED. Therapeutic Lovenox: 60mg PLAN 
-continue cardizem 100mg w/NS. Titrate 0-15cc/hr 
-continue lovenox -consult cards 3. Hyponatremia -  Na 125 on admission. Receive NS  
PLAN 
-repeat BMP s/p IV bolus 
-NS maint dose: 100cc/hr 
-monitor daily labs 
-replete per protocol 
-f/u urine sodium, urine osmolality, serum osmolality 4. UTI - Asymptomatic.  UA showed moderate blood, Trace LE, w/4+ bacteria and 3+ epithelial cells. Presence of epithelial cells may be suggestive of contaminated sample, however PLAN 
-continue abx as above will provide coverage for complicated UTI 
-continue cranberry supplement 450mg qday 5. SLE 
PLAN 
-continue home meds: Omega 3, Vit C, Vit D, Vit E 
 
6. Trigeminal neuralgia PLAN 
-continue carbamazepine 100mg ER BID 7. HLD PLAN 
-continue atorvastatin 10mg 8. HTN - systolic range [phone] / diastolic range 808-08 PLAN 
-continue dilt ggt 
-hold amlodipine 5mg qday 
-continue losartan 100mg q2days 
-consider labetalol if hi BP persists Diet Cardiac DVT Prophylaxis Lovenox GI Prophylaxis NA Code status DNI Disposition Tele >2MN Point of Contact Humberto Grimes Relationship: daughter 
[phone] Lázaro Gtz MD , PGY-1  
[facility] Medicine Intern Pager: [phone] January 17, 2020, 1:07 AM  
 
 
Admission History and Physical 
[address] Patient: Annie Toribio MRN: [id number]  CSN: [id number] YOB: 1933  Age: 80 y.o. Sex: female DOA: 1/16/2020 HPI:  
 
Annie Toribio is a 80 y.o. female with PMH IBS, insomnia, SLE, chronic low back pain with sciatica, depression, HLD, HTN, allergic rhinitis, fibromyalgia now admitted with cough and tachycardia. Patient states she has had a cough for approximately 2 weeks. It was initially productive. Patient has also had temp to 100.9 at home and reports other subjective fevers. Her daughter who is at bedside is a sick contact with similar symptoms.  Patient states she was seen by Dr. Jb Bhandari today and was found to have elevated heart rate and was sent for admission. Patient denies history of atrial fibrillation and her only blood pressure medications are amlodipine and losartan. She denies any chest pain or palpitations. She does report some shortness of breath with exertion. She reports joint aches and states that she has a history of lupus. She denies any dysuria or lower extremity edema, pain or swelling. ED Course:  
EKG atrial fibrillation with RVR  
CBC: WBC 23, Hgb 11.9,  
CMP: Na 125, K 3.8, Cr 0.51 Troponin's negative UA: mod blood, trace LE, 3+ epithelial cells, WBC 10-15, bacteria 4+ Meds: diltiazem, 1L NS, ceftriaxone, azithromycin Review of Systems Constitutional: Positive for fever. Negative for chills and diaphoresis. HENT: Negative for hearing loss and sore throat. Eyes: Negative for blurred vision and double vision. Respiratory: Positive for cough. Negative for hemoptysis. Cardiovascular: Negative for chest pain and palpitations. Gastrointestinal: Negative for nausea and vomiting. Genitourinary: Negative for dysuria and hematuria. Musculoskeletal: Positive for joint pain. Negative for myalgias. Skin: Negative for itching and rash. Neurological: Negative for dizziness and headaches. Psychiatric: Negative for depression and suicidal ideas. Past Medical History:  
Diagnosis Date  Dyslipidemia  H/O echocardiogram 07/2018 EF 65%, thickening of the mitral valve leaflets with annular calcification, no regurgitation or stenosis  Hypertension  Syncope 07/2018 No past surgical history on file. No family history on file. Social History Socioeconomic History  Marital status:  Spouse name: Not on file  Number of children: Not on file  Years of education: Not on file  Highest education level: Not on file Tobacco Use  Smoking status: Never Smoker  Smokeless tobacco: Never Used Substance and Sexual Activity  Alcohol use: No  
 
 
Allergies Allergen Reactions  Other Food Hives  Ace Inhibitors Unknown (comments)  Chlorhexidine Other (comments) Hives and swelling on contact  Ciprofloxacin Unknown (comments)  Hibiclens [Chlorhexidine Gluconate] Rash  Lisinopril Cough  Lyrica [Pregabalin] Unknown (comments)  Macrobid [Nitrofurantoin Monohyd/M-Cryst] Unknown (comments)  Minoxidil Swelling  Neurontin [Gabapentin] Unknown (comments)  Nitrofurantoin Unknown (comments) Nerve pain  Other Medication Hives Most blood pressure meds  Sulfa (Sulfonamide Antibiotics) Unknown (comments)  Tenex [Guanfacine] Unknown (comments)  Zocor [Simvastatin] Unknown (comments) Prior to Admission Medications Prescriptions Last Dose Informant Patient Reported? Taking? AZO CRANBERRY [id number] mg-mg-million tab Unknown at Unknown time  Yes No  
Sig: Take  by mouth. LORazepam (ATIVAN) 0.5 mg tablet Unknown at Unknown time  Yes No  
Sig: Take  by mouth every eight (8) hours as needed for Anxiety. albuterol (PROVENTIL HFA, VENTOLIN HFA, PROAIR HFA) 90 mcg/actuation inhaler Unknown at Unknown time  Yes No  
Sig: Take 2 Puffs by inhalation every six (6) hours as needed for Wheezing. amLODIPine (NORVASC) 5 mg tablet Unknown at Unknown time  Yes No  
Sig: TK 1 T PO QD  
ascorbic acid, vitamin C, (VITAMIN C) 500 mg tablet Unknown at Unknown time  Yes No  
Sig: Take  by mouth. aspirin (ASPIRIN) 325 mg tablet Unknown at Unknown time  Yes No  
Sig: Take 975 mg by mouth three (3) times daily. atorvastatin (LIPITOR) 10 mg tablet Unknown at Unknown time  Yes No  
Sig: Take 10 mg by mouth daily. carBAMazepine ER (CARBATROL ER) 100 mg capsule Unknown at Unknown time  Yes No  
Sig: Take 100 mg by mouth two (2) times a day. cholecalciferol (VITAMIN D3) 2,000 unit cap capsule Unknown at Unknown time  Yes No  
Sig: Take  by mouth two (2) times a day. fish oil-omega-3 fatty acids 300-500 mg cap Unknown at Unknown time  Yes No  
Sig: Take  by mouth.  
losartan (COZAAR) 100 mg tablet Unknown at Unknown time  Yes No  
Sig: Take 100 mg by mouth daily. montelukast (SINGULAIR) 10 mg tablet Unknown at Unknown time  Yes No  
Sig: Take 10 mg by mouth as needed. multivit-min/iron/folic/lutein (CENTRUM SILVER WOMEN PO) Unknown at Unknown time  Yes No  
Sig: Take  by mouth.  
vitamin E (AQUA GEMS) 400 unit capsule Unknown at Unknown time  Yes No  
Sig: Take  by mouth daily. Facility-Administered Medications: None Physical Exam:  
 
Patient Vitals for the past 24 hrs: 
 Temp Pulse Resp BP SpO2  
01/16/20 2300 98.3 °F (36.8 °C) (!) 121 28 (!) 154/135 93 % 01/16/20 2200  (!) 114 26 119/61 94 % 01/16/20 2150 99 °F (37.2 °C) (!) 120 30 152/71 94 % 01/16/20 2100  (!) 124 27  94 % 01/16/20 2026  (!) 125 25  91 % 01/16/20 2000  (!) 126 (!) 34 140/87 93 % 01/16/20 1930  (!) 133 27 (!) 154/96 91 % 01/16/20 1900  (!) 142 28 (!) 111/92 93 % 01/16/20 1800  (!) 138 (!) 44 130/72 92 % 01/16/20 1758  (!) 135 (!) 34 159/83 92 % 01/16/20 1730  (!) 132 (!) 42 146/79 93 % 01/16/20 1700     96 % 01/16/20 1500  (!) 140 29 149/76   
01/16/20 1419  (!) 129  (!) 144/97   
01/16/20 1400  (!) 139 (!) 41 (!) 144/97   
01/16/20 1250 98.6 °F (37 °C) (!) 141 25 (!) 133/93 95 % Physical Exam:  
General:  Alert and Responsive and in No acute distress. HEENT:  sclera anicteric. EOMI. Dry mucous membranes. No gross abnormalities appreciated. CV:  Tachycardic. Irregular rhythm. No murmurs, rubs, or gallops appreciated. No visible pulsations or thrills. RESP:  Unlabored breathing. She has minimal wheezing on right lung field. There is diffuse rhonchi however does clear with coughing. Equal expansion bilaterally. ABD:  Soft, nontender, nondistended. Normoactive bowel sounds. No hepatosplenomegaly. No suprapubic tenderness. MS:  No joint deformity or instability. No atrophy. Neuro:  Grossly moving all extremities, no focal deficits appreciated. Alert and oriented. Ext:  No edema, no swelling, erythema or tenderness. Skin:  No rashes, lesions, or ulcers. Psych: normal mood, normal affect IMAGING: Xr Chest [facility] Result Date: 1/16/2020 Impression: 1. Interstitial pneumonitis. Follow-up to resolution is recommended. Recent Results (from the past 24 hour(s)) METABOLIC PANEL, COMPREHENSIVE Collection Time: 01/16/20  1:21 PM  
Result Value Ref Range Sodium 125 (L) 136 - 145 mmol/L Potassium 3.8 3.5 - 5.5 mmol/L Chloride 94 (L) 100 - 111 mmol/L  
 CO2 21 21 - 32 mmol/L Anion gap 10 3.0 - 18 mmol/L Glucose 162 (H) 74 - 99 mg/dL BUN 19 (H) 7.0 - 18 MG/DL Creatinine 0.51 (L) 0.6 - 1.3 MG/DL  
 BUN/Creatinine ratio 37 (H) 12 - 20 GFR est AA >60 >60 ml/min/1.73m2 GFR est non-AA >60 >60 ml/min/1.73m2 Calcium 8.7 8.5 - 10.1 MG/DL Bilirubin, total 0.3 0.2 - 1.0 MG/DL  
 ALT (SGPT) 27 13 - 56 U/L  
 AST (SGOT) 33 10 - 38 U/L Alk. phosphatase 130 (H) 45 - 117 U/L Protein, total 6.6 6.4 - 8.2 g/dL Albumin 2.2 (L) 3.4 - 5.0 g/dL Globulin 4.4 (H) 2.0 - 4.0 g/dL A-G Ratio 0.5 (L) 0.8 - 1.7    
CBC WITH AUTOMATED DIFF Collection Time: 01/16/20  1:24 PM  
Result Value Ref Range WBC 23.0 (H) 4.6 - 13.2 K/uL  
 RBC 3.64 (L) 4.20 - 5.30 M/uL  
 HGB 11.9 (L) 12.0 - 16.0 g/dL HCT 33.4 (L) 35.0 - 45.0 % MCV 91.8 74.0 - 97.0 FL  
 MCH 32.7 24.0 - 34.0 PG  
 MCHC 35.6 31.0 - 37.0 g/dL  
 RDW 11.7 11.6 - 14.5 % PLATELET 667 722 - 256 K/uL MPV 9.1 (L) 9.2 - 11.8 FL  
 NEUTROPHILS 69 42 - 75 % BAND NEUTROPHILS 22 (H) 0 - 5 % LYMPHOCYTES 4 (L) 20 - 51 % MONOCYTES 4 2 - 9 % EOSINOPHILS 0 0 - 5 % BASOPHILS 0 0 - 3 % METAMYELOCYTES 1 (H) 0 %  
 ABS. NEUTROPHILS 20.9 (H) 1.8 - 8.0 K/UL  
 ABS. LYMPHOCYTES 0.9 0.8 - 3.5 K/UL  
 ABS. MONOCYTES 0.9 0 - 1.0 K/UL ABS. EOSINOPHILS 0.0 0.0 - 0.4 K/UL  
 ABS. BASOPHILS 0.0 0.0 - 0.06 K/UL  
 DF MANUAL PLATELET COMMENTS ADEQUATE PLATELETS    
 RBC COMMENTS NORMOCYTIC, NORMOCHROMIC    
TROPONIN I Collection Time: 01/16/20  1:24 PM  
Result Value Ref Range Troponin-I, QT <0.02 0.0 - 0.045 NG/ML  
EKG, 12 LEAD, INITIAL Collection Time: 01/16/20  1:30 PM  
Result Value Ref Range Ventricular Rate 132 BPM  
 Atrial Rate 163 BPM  
 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  
leads Confirmed by Vishal Ashby MD, Lu Benitez ([id number]) on 1/16/2020 2:44:56 PM 
  
POC LACTIC ACID Collection Time: 01/16/20  1:32 PM  
Result Value Ref Range Lactic Acid (POC) 0.95 0.40 - 2.00 mmol/L  
URINALYSIS W/ RFLX MICROSCOPIC Collection Time: 01/16/20  2:38 PM  
Result Value Ref Range Color YELLOW Appearance CLOUDY Specific gravity >1.030 (H) 1.005 - 1.030  
 pH (UA) 5.5 5.0 - 8.0 Protein >1,000 (A) NEG mg/dL Glucose NEGATIVE  NEG mg/dL Ketone 40 (A) NEG mg/dL Bilirubin NEGATIVE  NEG Blood MODERATE (A) NEG Urobilinogen 1.0 0.2 - 1.0 EU/dL Nitrites NEGATIVE  NEG Leukocyte Esterase TRACE (A) NEG    
INFLUENZA A & B AG (RAPID TEST) Collection Time: 01/16/20  2:38 PM  
Result Value Ref Range Influenza A Antigen NEGATIVE  NEG Influenza B Antigen NEGATIVE  NEG    
URINE MICROSCOPIC ONLY Collection Time: 01/16/20  2:38 PM  
Result Value Ref Range WBC 10 to 15 0 - 4 /hpf  
 RBC 10 to 15 0 - 5 /hpf Epithelial cells 3+ 0 - 5 /lpf Bacteria 4+ (A) NEG /hpf Assessment/Plan: 80 y.o. female with PMH IBS, insomnia, SLE, chronic low back pain with sciatica, depression, HLD, HTN, allergic rhinitis, fibromyalgia now admitted with cough and tachycardia New onset Atrial Fibrillation with RVR Pt denies history of atrial fibrillation. EKG on admission today showed atrial fibrillation with RVR. Patient was given 10 mg diltiazem bolus placed on diltiazem drip. CHADsVasc of 2 and patient started on anticoagulation with Lovenox in the ED. Pt denies any history of bleeding or trauma. Discussed risks and benefits of anticoagulation with patient. Patient denies any history of bleeding or trauma. HR initially up to 140s and now down mostly to 110s-120s. This may be in the setting of possible sepsis 2/2 to pneumonia. Patient is followed by Dr. Rodriguez Richey cardiology as an outpatient. She is currently taking amlodipine and losartan for her HTN. Patient is currently on max dose of diltiazem ggt. Patient denies any chest pain currently. She does report shortness of breath and is on 2 L NC. Patient had echo cardiogram 7/18/18 which showed EF of 65%. Patient also had a negative nuclear stress test done 8/14/18. 
- admit to tele  
- repeat EKG in am  
- continue Lovenox 1 mg/kg BID for anticoagulation  
- continue diltiazem ggt  
- cardiology consult in am  
- echocardiogram  
- will recheck Na however her heart rate will likely improve with fluids as she appears dry on examination  
- daily CBC, BMP Sepsis likely secondary to Community Acquired Pneumonia vs UTI Patient has had cough for about 2 weeks was initially productive. She does report subjective fevers at home and temp to 100.9. Her chest x ray on admission Showed concern for interstitial pneumonitis. Her WBC was elevated to 23. She was given 1 L NS bolus on admission however did not want to fluid overload in setting of a fib with RVR. She does have a dry cough on examination however is non-toxic appearing. Sating 95% on 2 L NC.  She was started on rocephin and azithromycin in the ED. UA showed trace LE 3+ epithelial cells, 10-15 WBCs, and 4+ bacteria. She denies any dysuria so her presentation is less likely due to UTI. Blood cultures taken in ED  
- continue azithromycin 500 mg daily  
- continue ceftriaxone 2 g q 24 hours - IV fluids pending repeat Na  
- follow up blood cultures  
- supplemental oxygen as needed Hyponatremia Hypovolemic vs SIADH in setting of possible PNA. Likely chronic in setting of poor PO intake over the past several weeks and she does appear somewhat dehydrated on examination. Na down to 125 on admission. She does not have any symptoms of hyponatremia currently. - repeat BMP   
- check plasma osmolality, urine osmolality, urine na  
- caution to ensure serum Na does not increase more that 8 mEq/L in 245 hour period 
- consider addition of slow rate of isotonic saline HTN/HLD  
Hypertensive in the ED.  
- continue diltiazem ggt in setting of Afib 
- will start metoprolol 12.5 mg BID  
- hold home losartan 100 mg  
- hold home amlodipine 5 mg daily  
- continue home atorvastatin 10 mg daily  
  
SLE with history of lupus nephritis  
noted on problem list, kidney function stable with creatinine wnl - Monitor kidney function with daily BMP  
   
Chronic pain/osteoarthritis/fibromyalgia/trigeminal neuralgia 
- Continue home carbamazepine  XR Q12 hrs 
  
Anxiety/Depression No acute concerns. No SSRIs in med list 
- hold home lorazepam 0.5 mg daily 
   
Allergies  
seasonal  
- Continue home singulair 10 mg daily Disposition and anticipated LOS: 2 midnights Dulce Ivan MD PGY-2 
[address] O-L Flap Text: The defect edges were debeveled with a #15 scalpel blade.  Given the location of the defect, shape of the defect and the proximity to free margins an O-L flap was deemed most appropriate.  Using a sterile surgical marker, an appropriate advancement flap was drawn incorporating the defect and placing the expected incisions within the relaxed skin tension lines where possible.    The area thus outlined was incised deep to adipose tissue with a #15 scalpel blade.  The skin margins were undermined to an appropriate distance in all directions utilizing iris scissors.

## 2023-10-19 NOTE — PROGRESS NOTES
responded to [employer] for  Cele Robles, who is a 80 y.o.,female, The  provided the following Interventions: 
Provided crisis pastoral care and pastoral support. Offered prayers on behalf for the patient. Chart reviewed. The following outcomes were achieved: 
Provided family support. Assessment: 
There are no spiritual or Advent issues which require intervention at this time. Plan: 
Chaplains will continue to follow and will provide pastoral care on an as needed/requested basis.  recommends bedside caregivers page  on duty if patient shows signs of acute spiritual or emotional distress. Faxed Refill Request of Hydrochlorothiazide received from [facility]. Medication Pended. Pt last seen on 5/31/23, Next appt is 11/9/23.     Health Maintenance   Topic Date Due    Hepatitis B vaccine (1 of 3 - 3-dose series) Never done    COVID-19 Vaccine (1) Never done    Cervical cancer screen  06/20/2017    Pneumococcal 0-64 years Vaccine (2 - PCV) 03/17/2018    Shingles vaccine (1 of 2) Never done    Low dose CT lung screening &/or counseling  Never done    DTaP/Tdap/Td vaccine (2 - Td or Tdap) 01/11/2023    Flu vaccine (1) 08/01/2023    Breast cancer screen  10/14/2023    Depression Screen  05/31/2024    Lipids  08/24/2025    Colorectal Cancer Screen  05/08/2033    HIV screen  Completed    Hepatitis A vaccine  Aged Out    Hib vaccine  Aged Out    Meningococcal (ACWY) vaccine  Aged Out       Hemoglobin A1C (%)   Date Value   09/01/2021 4.3             ( goal A1C is < 7)   No components found for: \"LABMICR\"  LDL Cholesterol (mg/dL)   Date Value   08/24/2020 58       (goal LDL is <100)   AST (U/L)   Date Value   05/05/2023 20     ALT (U/L)   Date Value   05/05/2023 18     BUN (mg/dL)   Date Value   05/08/2023 5 (L)     BP Readings from Last 3 Encounters:   08/28/23 108/76   06/12/23 (!) 135/90   05/31/23 130/80          (goal 120/80)    All Future Testing planned in [facility]  Lab Frequency Next Occurrence   AFP Tumor Marker Once 06/12/2023   Hepatic Function Panel Once 06/12/2023   CT Lung Screen (Initial/Annual/Baseline) Once 08/28/2023       Next Visit Date:  Future Appointments   Date Time Provider [address]   11/9/2023  1:30 PM Rober Jacob MD [facility]            Patient Active Problem List:     Asthma     COPD (chronic obstructive pulmonary disease) ([address])     Hepatitis C     GERD (gastroesophageal reflux disease)     Hyperlipidemia     HTN (hypertension)     Allergic dermatitis     Rash     Vaginal discharge     Cervicitis     Viral gastroenteritis     Eosinophilic asthma

## 2023-11-20 NOTE — ED NOTES
Assumed care of pt from night-shift RN.
Assumed care of pt, report received from Rhina Wahl, pt resting in hospital bed, report stated pt was on cardizem but no cardizem running at this time, will restart as soon as possible, pts heart rate still elevated 120-140, pt has fluid running, pt is confused and continues to try and get out of bed, pt alert, ambulatory, pulled second IV out prior shift and has not yet received antibiotics, antibiotics compatible with cardizem so will start antibiotics as well, pt awaiting bed assignment at this time, sitter at bedside, will continue to monitor. Pt is altered at this time and according to family is not her baseline.
Echo called; request bedside echo. Staff unavailable to escort pt to echo; pt requires cardiac monitoring and diltiazem drip
Patient ambulated to the bathroom with one person assist with steady gait.
Pt re-roomed to AI66; FY48 closed for sprinkler maintenance.
RN notified that pt was attempting to get out of bed. ED Tech entered pt's room to find pt \"trying to  her blanket from the floor. \"  Pt noted to be increasingly altered. Charge RN notified. Hospitalist paged. Waiting for orders. Will continue to monitor until further orders received.   Jovanny Hernandez RN 

Received report from [facility]. Patient on hospital bed. Patient complains of worsening cough. No other complaints. Patient using her phone trying to make a phone call. Patient has no complaints of pain. No obvious signs of distress noted.
The patient is a 17y Female complaining of
